# Patient Record
Sex: FEMALE | Race: BLACK OR AFRICAN AMERICAN | Employment: OTHER | URBAN - METROPOLITAN AREA
[De-identification: names, ages, dates, MRNs, and addresses within clinical notes are randomized per-mention and may not be internally consistent; named-entity substitution may affect disease eponyms.]

---

## 2017-01-05 ENCOUNTER — TELEPHONE (OUTPATIENT)
Dept: FAMILY MEDICINE CLINIC | Age: 82
End: 2017-01-05

## 2017-01-05 NOTE — TELEPHONE ENCOUNTER
1st attempt to reach patient regarding scheduling follow up appointment with dr Deejay Lott and also for fasting labs left message to call back

## 2017-01-05 NOTE — TELEPHONE ENCOUNTER
----- Message from Kelton Venegas MD sent at 1/3/2017  1:54 AM EST -----  Please call patient and let her know that she needs to reschedule her appointment so we can review her labs - many of which were abnormal. She canceled on 12/21 and her next appointment is not until 3/2017. I need to see her in the office before that - preferably this month. She needs to repeat her fasting lipid panel along with some other labs before she comes back to see me and do the UPEP that was ordered. Thanks.

## 2017-01-11 NOTE — TELEPHONE ENCOUNTER
Spoke to pt aware needs to come in for sooner appointment to recheck fasting lipids and UPEP values pt verbalized full understanding then stated she has been under the weather since before Christmas and was seen in the ER and the dr checked her over thoroughly to pull records to give to Dr Lula Benitez and then call her to see if she still needs to come in

## 2017-02-08 RX ORDER — BENAZEPRIL HYDROCHLORIDE 40 MG/1
40 TABLET ORAL DAILY
Qty: 90 TAB | Refills: 0 | Status: SHIPPED | OUTPATIENT
Start: 2017-02-08 | End: 2017-11-21 | Stop reason: SDUPTHER

## 2017-02-08 NOTE — TELEPHONE ENCOUNTER
Requested Prescriptions     Pending Prescriptions Disp Refills    benazepril (LOTENSIN) 40 mg tablet 90 Tab 3     Sig: Take 1 Tab by mouth daily.    last visit 12/16/16  Next visit 2/20/17

## 2017-02-21 ENCOUNTER — OFFICE VISIT (OUTPATIENT)
Dept: FAMILY MEDICINE CLINIC | Age: 82
End: 2017-02-21

## 2017-02-21 VITALS
WEIGHT: 122 LBS | BODY MASS INDEX: 21.62 KG/M2 | DIASTOLIC BLOOD PRESSURE: 78 MMHG | HEIGHT: 63 IN | TEMPERATURE: 98.2 F | OXYGEN SATURATION: 99 % | RESPIRATION RATE: 20 BRPM | HEART RATE: 86 BPM | SYSTOLIC BLOOD PRESSURE: 141 MMHG

## 2017-02-21 DIAGNOSIS — E11.9 CONTROLLED TYPE 2 DIABETES MELLITUS WITHOUT COMPLICATION, WITHOUT LONG-TERM CURRENT USE OF INSULIN (HCC): ICD-10-CM

## 2017-02-21 DIAGNOSIS — E83.52 HYPERCALCEMIA: ICD-10-CM

## 2017-02-21 DIAGNOSIS — R42 DIZZINESS: ICD-10-CM

## 2017-02-21 DIAGNOSIS — E78.2 MIXED HYPERLIPIDEMIA: ICD-10-CM

## 2017-02-21 DIAGNOSIS — R26.81 GAIT INSTABILITY: ICD-10-CM

## 2017-02-21 DIAGNOSIS — D75.839 THROMBOCYTOSIS: ICD-10-CM

## 2017-02-21 DIAGNOSIS — I10 ESSENTIAL HYPERTENSION: ICD-10-CM

## 2017-02-21 NOTE — PROGRESS NOTES
Stephan Townsend is a 80 y.o. female and presents with Follow-up (hypertension)       Subjective:  Mrs. Jose Alejandro Watkins is here today to follow up on her chronic medical conditions. I was supposed to see her back before now, but she canceled her 12/21/16 appointment. Patient also visited the ER in 12/2016 because she was concerned about her elevated BP and felt dizzy/lightheaded. Workup in the ER was pretty unremarkable. Her labs all looked good and exam was normal. I am unable to view her EKG at this time, but provider wrote that it was unchanged from her previous. She also had a head CT that stated \"Atrophy. Small vessel disease. There is no mass nor hemorrhage. There is no acute infarct. \" She says that she has been doing well sine then, although the dizziness has persisted intermittently. Patient says she was given meclizine in the ED and that helped a lot with the dizziness. She has been taking it prn ever since because she had some left over at home from a previous prescriptions. Last time she required a meclizine was on Sunday. 1. HTN: /78 which I think is okay for someone her age, especially given her c/o dizziness/lightheadedness. Meds include Lotensin 40 mg and Norvasc 5 mg. Patient reports compliance taking her meds. 2. Dizziness/Lightheadedness: Per previous notes, patient has c/o dizziness in the past, but it was related to her taking all of her BP meds at once. When she started taking them one at a time, her symptoms improved a lot. Today, patient reports that the dizziness/lightheadedness she has been experiencing for the last couple of months is different. Per Mrs. Gonsales, the dizziness usually occurs right after she eats breakfast every morning and before she has even taken her medications. Symptoms last about 30-45 minutes. Sitting in her recliner watching TV and drinking water helps. Room is not spinning, but she feels like she might fall down while walking.  Patient says symptoms not made worse by moving her head or changing positions. Denies visual changes, n/v, chest pain, sob. Denies loss of consciousness. This happens every morning after she has eaten, however, symptoms do not occur after eating any other meal throughout the day. 3. HLD: Last FLP from 12/2016 with LDL of 146 is inaccurate because patient realized later that she had eaten a full breakfast that morning. Patient is prescribed Tricor and Zocor 40 mg. She is taking the Tricor, but skips the Zocor because she is supposed to take it in the evening and she goes to bed too early. Need to repeat labs.      4. T2DM: Most recent A1c up to 7.0. Medication is Januvia 100 mg, but she does not take it daily as prescribed - she says her previous pcp (Dr. Rancho Hoyos) told her to take the Januvia only if her FBG in the am is > 125. Given the increase in her A1c from 6.6 --> 7.0, we discussed her starting to take the Januvia daily or even cutting the dose to 50 mg daily since she only takes the 100 mg tablet about once/wk. However, patient is not thrilled with the idea and wants to continue taking the medication prn. Her Ophthalmologist is Dr. Ty Painting - last visit was 3/2016. She has never seen a Podiatrist.       5. Hypercalcemia: Chronic since 2015. Calcium still up from labs in 12/2016. PTH-rp <1.1, SPEP wnl, Vitamin D 25-OH level slightly low at 29.1, Vitamin D 1,25 dihydroxy was elevated at 79.5. Patient denies any symptoms - no abdominal pain, no bone pain, no hx of kidney stones that she reports.      6. Thrombocytosis: Platelets elevated. Noted consistently on CBCs in our records, but not in care everywhere.         Health Maintenance:  Colonoscopy - Followed by Dr. Bertha Ford. Last in 2/2012. Exam limited to the hepatic flexure secondary to a poor colon preparation/moderate diverticulosis/internal hemorrhoids  Mammogram - Patient reports that she has not had one in about 3 years.  Last in [facility] is from 2010 - no evidence of malignancy  DEXA Scan - She is not sure if she ever had one or wants one  Flu Shot - Declined for 2016    ROS:  Pt denies: Wt loss, Fever/Chills, Visual changes, Fatigue, Chest pain, SOB, SHINE, Abd pain, N/V/D/C, Blood in stool or urine, Edema. Pertinent positive as above in HPI. All others negative.  (+) HA - last night, but not today. She took 4 baby aspirin and it  I encouraged her to try Tylenol for HA in the future. .     The problem list was updated as a part of today's visit. Patient Active Problem List   Diagnosis Code    Hypertension I10    Hypercalcemia E83.52    Diabetes ([facility] E11.9    Hyperlipidemia E78.5    Goiter E04.9    Diverticulosis K57.90    Immunization refused Z28.21    [facility] mellitus type [facility], [facility] E11.9       The PSH, FH were reviewed. SH:  Social History   Substance Use Topics    Smoking status: Former Smoker     Packs/day: 0.25     Years: 1.00     Types: Cigarettes    Smokeless tobacco: None    Alcohol use No         Medications/Allergies:  Current Outpatient Prescriptions on File Prior to Visit   Medication Sig Dispense Refill    benazepril (LOTENSIN) 40 mg tablet Take 1 Tab by mouth daily. 90 Tab 0    SITagliptin (JANUVIA) 100 mg tablet Take 1 Tab by mouth daily. 30 Tab 0    simvastatin (ZOCOR) 40 mg tablet Take 1 Tab by mouth nightly. 90 Tab 3    amLODIPine (NORVASC) 5 mg tablet Take 1 Tab by mouth daily. 90 Tab 3    cholecalciferol (VITAMIN D3) 1,000 unit tablet Take 2 Tabs by mouth daily. 60 Tab 11    aspirin delayed-release 81 mg tablet Take  by mouth daily.  Blood-Glucose Meter (EMBRACE BLOOD GLUCOSE SYSTEM) misc by Does Not Apply route.  omega-3 fatty acids-vitamin e (FISH OIL) 1,000 mg cap One capsule daily. 60 Cap 5    fenofibrate nanocrystallized (TRICOR) 48 mg tablet Take 1 Tab by mouth daily. 90 Tab 3     No current facility-administered medications on file prior to visit.            Allergies   Allergen Reactions    Codeine Other (comments)     Felt like a different person, per patient    Iodine Itching    Pcn [Penicillins] Itching    Sulfa (Sulfonamide Antibiotics) Unknown (comments)    Vicodin [Hydrocodone-Acetaminophen] Nausea Only       Objective:  Visit Vitals    /78 (BP 1 Location: Left arm, BP Patient Position: Sitting)    Pulse 86    Temp 98.2 °F (36.8 °C) (Oral)    Resp 20    Ht 5' 3\" (1.6 m)    Wt 122 lb (55.3 kg)    SpO2 99%  Comment: room air    BMI 21.61 kg/m2    Body mass index is 21.61 kg/(m^2). Appearance: Alert, well appearing, in no respiratory distress and well hydrated. HEENT: NC/AT. Exterior ears and tympanic membranes normal bilaterally. Conjunctiva normal. PERRL. EOMI  Oropharynx clear and moist mucous membranes. Neck: Supple. No cervical lymphadenopathy. Heart: RRR without M/R/G. Intact distal pulses. No edema. Lungs: CTAB, no rhonchi, rales, or wheezes with good air exchange  Abdomen: Soft, normoactive BS, non-tender, non-distended, no palpable masses   MSK: Gait normal. Joints without deformity/tenderness. Strength intact bilateral upper and lower ext. ROM all extremities. Feet warm and well perfused with good pulses. Skin: No rash   Neuro: AAO x 3. CN 2-12 intact. No focal motor or sensory deficits. Speech normal.  Psych: Appropriate affect, judgement and insight. Short-term memory intact.      Labwork and Ancillary Studies:    CBC w/Diff  Lab Results   Component Value Date/Time    WBC 5.3 12/16/2016 08:32 AM    HGB 13.1 12/16/2016 08:32 AM    PLATELET 884 63/22/6433 08:32 AM         Basic Metabolic Profile  Lab Results   Component Value Date/Time    Sodium 141 12/16/2016 08:32 AM    Potassium 4.4 12/16/2016 08:32 AM    Chloride 102 12/16/2016 08:32 AM    CO2 27 12/16/2016 08:32 AM    Glucose 163 12/16/2016 08:32 AM    BUN 16 12/16/2016 08:32 AM    Creatinine 1.13 12/16/2016 08:32 AM    BUN/Creatinine ratio 14 12/16/2016 08:32 AM    GFR est AA 51 12/16/2016 08:32 AM    GFR est non-AA 44 12/16/2016 08:32 AM    Calcium 11.0 12/16/2016 08:32 AM        Cholesterol  Lab Results   Component Value Date/Time    Cholesterol, total 238 12/16/2016 08:32 AM    HDL Cholesterol 58 12/16/2016 08:32 AM    LDL, calculated 146 12/16/2016 08:32 AM    Triglyceride 171 12/16/2016 08:32 AM       Assessment/Plan:      ICD-10-CM ICD-9-CM    1. Essential hypertension I10 401.9 BP okay today. Do not want it any lower at this time given patient's c/o dizziness and lightheadedness. METABOLIC PANEL, BASIC   2. Dizziness R42 780.4 Unclear etiology. Does not sound like typical vertigo. Related to her eating breakfast only? Asked patient to check her BP when symptoms occur. Orthostatics checked in the office today - Normal. Neuro exam unremarkable today. Gait normal. Will ask Neuro to weigh in. REFERRAL TO NEUROLOGY   3. Gait instability R26.81 781.2 REFERRAL TO NEUROLOGY   4. Mixed hyperlipidemia E78.2 272.2 Need to repeat lipid panel since patient was not fasting before. Suspect LDL will be worse since she has been skipping her statin. Encouraged patient be compliant with her meds. LIPID PANEL   5. Controlled type 2 diabetes mellitus without complication, without long-term current use of insulin (HCC) E11.9 250.00 A1c up to 7.0 from 6.6. Patient still unwilling to take Januvia daily. In fact, she refuses to take any diabetes medication daily. She wants to try and work on her diet for now. 6. Hypercalcemia E83.52 275.42 Not quite sure why levels are up in our records and normal in Care Everywhere. Will repeat level along with intact PTH. Patient still needs to do 24 hour urine tests - she refused last time. Will also get CXR given elevated 1,25-dihydroxy vitamin d. No lymph nodes appreciated on exam today. May also need CT abdomen to look at liver, kidneys etc. Asking heme/onc to weigh in on this as well.   METABOLIC PANEL, BASIC      PTH INTACT      CALCIUM, UR, 24 HR      PROTEIN ELECTROPHORESIS + JOSE ENRIQUE, UR, 24HR      XR CHEST PA LAT   7. Thrombocytosis (HCC) D47.3 238.71 Not quite sure why levels are up in our system and normal in Care Everywhere. Will repeat CBC along with peripheral smear. Heme/Onc to help. CBC WITH AUTOMATED DIFF      PERIPHERAL SMEAR      REFERRAL TO HEMATOLOGY ONCOLOGY       Follow-up Disposition:  Return in about 2 months (around 4/21/2017). Fasting lab appointment on Monday and CXR - patient can't do today because her son has to go to work and she needs to leave. .    I have discussed the diagnosis with the patient and the intended plan as seen in the above orders. The patient has received an After-Visit Summary and questions were answered concerning future plans. Patient verbalized understanding of above instructions. Health Maintenance:   Health Maintenance   Topic Date Due    FOOT EXAM Q1  12/08/2016    MEDICARE YEARLY EXAM  03/18/2017    EYE EXAM RETINAL OR DILATED Q1  03/28/2017    HEMOGLOBIN A1C Q6M  06/16/2017    Pneumococcal 65+ Low/Medium Risk (2 of 2 - PPSV23) 06/17/2017    MICROALBUMIN Q1  12/16/2017    LIPID PANEL Q1  12/16/2017    GLAUCOMA SCREENING Q2Y  03/28/2018    DTaP/Tdap/Td series (2 - Td) 06/17/2026    OSTEOPOROSIS SCREENING (DEXA)  Addressed    ZOSTER VACCINE AGE 60>  Addressed    INFLUENZA AGE 9 TO ADULT  Addressed       No orders of the defined types were placed in this encounter.

## 2017-02-21 NOTE — MR AVS SNAPSHOT
Visit Information Date & Time Provider Department Dept. Phone Encounter #  
 2/21/2017 11:45 AM Andrew Peralta, [facility] [phone] [id number] Follow-up Instructions Return in about 2 months (around 4/21/2017) for Fasting lab appointment on Monday and CXR. Your Appointments 3/20/2017 11:00 AM  
Office Visit with Andrew Peralta MD  
[address] Appt Note: [address] [place] [facility] [address]  
  
   
 [address]  [address] [address] [place] [address] [address] Upcoming Health Maintenance Date Due  
 FOOT EXAM Q1 12/8/2016 MEDICARE YEARLY EXAM 3/18/2017 EYE EXAM RETINAL OR DILATED Q1 3/28/2017 HEMOGLOBIN A1C Q6M 6/16/2017 Pneumococcal 65+ Low/Medium Risk (2 of 2 - PPSV23) 6/17/2017 MICROALBUMIN Q1 12/16/2017 LIPID PANEL Q1 12/16/2017 GLAUCOMA SCREENING Q2Y 3/28/2018 DTaP/Tdap/Td series (2 - Td) 6/17/2026 Allergies as of 2/21/2017  Review Complete On: 12/12/2016 By: Tova Mccurdy LPN Severity Noted Reaction Type Reactions Codeine  11/10/2015    Other (comments) Felt like a different person, per patient Iodine  11/10/2015    Itching Pcn [Penicillins]  11/10/2015    Itching Sulfa (Sulfonamide Antibiotics)  11/10/2015    Unknown (comments) Vicodin [Hydrocodone-acetaminophen]  11/10/2015    Nausea Only Current Immunizations  Reviewed on 6/17/2016 No immunizations on file. Not reviewed this visit You Were Diagnosed With   
  
 Codes Comments Dizziness    -  Primary ICD-10-CM: S84 ICD-9-CM: 780.4 Gait instability     ICD-10-CM: R26.81 
ICD-9-CM: 847. 2 Vitals BP Pulse Temp Resp Height(growth percentile) Weight(growth percentile) 141/78 (BP 1 Location: Left arm, BP Patient Position: Sitting) 86 98.2 °F (36.8 °C) (Oral) 20 5' 3\" (1.6 m) 122 lb (55.3 kg) SpO2 BMI OB Status Smoking Status 99% 21.61 kg/m2 Hysterectomy Former Smoker Vitals History BMI and BSA Data Body Mass Index Body Surface Area  
 21.61 kg/m 2 1.57 m 2 Preferred Pharmacy Pharmacy Name Phone [id number] [phone] Your Updated Medication List  
  
   
This list is accurate as of: 2/21/17 12:51 PM.  Always use your most recent med list. amLODIPine 5 mg tablet Commonly known as:  Greenberg Andrés Take 1 Tab by mouth daily. aspirin delayed-release 81 mg tablet Take  by mouth daily. benazepril 40 mg tablet Commonly known as:  LOTENSIN Take 1 Tab by mouth daily. cholecalciferol 1,000 unit tablet Commonly known as:  VITAMIN D3 Take 2 Tabs by mouth daily. [id number] Generic drug:  Blood-Glucose Meter  
by Does Not Apply route. fenofibrate nanocrystallized 48 mg tablet Commonly known as:  [employer] Take 1 Tab by mouth daily. FISH OIL 1,000 mg Cap Generic drug:  omega-3 fatty acids-vitamin e One capsule daily. simvastatin 40 mg tablet Commonly known as:  ZOCOR Take 1 Tab by mouth nightly. SITagliptin 100 mg tablet Commonly known as:  Vallery Alberto Take 1 Tab by mouth daily. Prescriptions Sent to Pharmacy Refills SITagliptin (JANUVIA) 100 mg tablet 0 Sig: Take 1 Tab by mouth daily. Class: Normal  
 Pharmacy: On license of [facility] [address] #: [phone] Route: Oral  
  
Follow-up Instructions Return in about 2 months (around 4/21/2017) for Fasting lab appointment on Monday and CXR. Introducing [facility]! Agustin Siddiqui introduces [employer] patient portal. Now you can access parts of your medical record, email your doctor's office, and request medication refills online. 1. In your internet browser, go to [email or web address] 2. Click on the First Time User? Click Here link in the Sign In box. You will see the New Member Sign Up page. 3. Enter your [employer] Access Code exactly as it appears below. You will not need to use this code after youve completed the sign-up process. If you do not sign up before the expiration date, you must request a new code. · [employer] Access Code: [email or web address] Expires: 3/12/2017 10:33 AM 
 
4. Enter the last four digits of your Social Security Number (xxxx) and Date of Birth (mm/dd/yyyy) as indicated and click Submit. You will be taken to the next sign-up page. 5. Create a CityLive ID. This will be your [employer] login ID and cannot be changed, so think of one that is secure and easy to remember. 6. Create a [employer] password. You can change your password at any time. 7. Enter your Password Reset Question and Answer. This can be used at a later time if you forget your password. 8. Enter your e-mail address. You will receive e-mail notification when new information is available in [address] 9. Click Sign Up. You can now view and download portions of your medical record. 10. Click the Download Summary menu link to download a portable copy of your medical information. If you have questions, please visit the Frequently Asked Questions section of the [employer] website. Remember, [employer] is NOT to be used for urgent needs. For medical emergencies, dial 911. Now available from your iPhone and Android! Please provide this summary of care documentation to your next provider. Your primary care clinician is listed as Herman Malave. If you have any questions after today's visit, please call [phone].

## 2017-02-21 NOTE — PROGRESS NOTES
Pt here today for follow up hypertension    1. Have you been to the ER, urgent care clinic since your last visit? Hospitalized since your last visit? yes [facility] 12/21/2016    2. Have you seen or consulted any other health care providers outside of the [address] [place] [address] since your last visit? Include any pap smears or colon screening.  No

## 2017-03-06 ENCOUNTER — HOSPITAL ENCOUNTER (OUTPATIENT)
Dept: INFUSION THERAPY | Age: 82
Discharge: HOME | End: 2017-03-06
Payer: MEDICARE

## 2017-03-06 ENCOUNTER — OFFICE VISIT (OUTPATIENT)
Dept: ONCOLOGY | Age: 82
End: 2017-03-06

## 2017-03-06 VITALS
OXYGEN SATURATION: 100 % | HEART RATE: 66 BPM | SYSTOLIC BLOOD PRESSURE: 182 MMHG | BODY MASS INDEX: 21.62 KG/M2 | RESPIRATION RATE: 17 BRPM | DIASTOLIC BLOOD PRESSURE: 91 MMHG | HEIGHT: 63 IN | WEIGHT: 122 LBS | TEMPERATURE: 98.7 F

## 2017-03-06 VITALS
SYSTOLIC BLOOD PRESSURE: 181 MMHG | DIASTOLIC BLOOD PRESSURE: 84 MMHG | WEIGHT: 122 LBS | OXYGEN SATURATION: 100 % | RESPIRATION RATE: 17 BRPM | HEART RATE: 75 BPM | BODY MASS INDEX: 21.62 KG/M2 | TEMPERATURE: 98 F | HEIGHT: 63 IN

## 2017-03-06 DIAGNOSIS — D75.839 THROMBOCYTOSIS: Primary | ICD-10-CM

## 2017-03-06 DIAGNOSIS — E83.52 HYPERCALCEMIA: ICD-10-CM

## 2017-03-06 DIAGNOSIS — I10 ESSENTIAL HYPERTENSION: ICD-10-CM

## 2017-03-06 LAB
CRP SERPL-MCNC: <0.3 MG/DL (ref 0–0.3)
FERRITIN SERPL-MCNC: 88 NG/ML (ref 8–388)
IRON SATN MFR SERPL: 24 %
IRON SERPL-MCNC: 73 UG/DL (ref 50–175)
TIBC SERPL-MCNC: 299 UG/DL (ref 250–450)

## 2017-03-06 PROCEDURE — 81270 JAK2 GENE: CPT | Performed by: INTERNAL MEDICINE

## 2017-03-06 PROCEDURE — 83540 ASSAY OF IRON: CPT | Performed by: INTERNAL MEDICINE

## 2017-03-06 PROCEDURE — 86140 C-REACTIVE PROTEIN: CPT | Performed by: INTERNAL MEDICINE

## 2017-03-06 PROCEDURE — 36415 COLL VENOUS BLD VENIPUNCTURE: CPT

## 2017-03-06 PROCEDURE — 82728 ASSAY OF FERRITIN: CPT | Performed by: INTERNAL MEDICINE

## 2017-03-06 NOTE — MR AVS SNAPSHOT
Visit Information Date & Time Provider Department Dept. Phone Encounter #  
 3/6/2017 10:00 AM Rene Amor, [address]  Oncology [phone] [id number] Follow-up Instructions Return in about 2 weeks (around 3/20/2017), or if symptoms worsen or fail to improve, for review results of work-up. Routing History Follow-up and Disposition History Your Appointments 3/20/2017 11:00 AM  
Office Visit with Jon Tabares MD  
[facility] [address] [place] [address]) Appt Note: [address] [place]  
  
   
 [address] [id number]  
  
    
 3/21/2017 10:00 AM  
Follow Up with Rene Amor MD  
[address] [facility] Oncology [address] [place] [address]) Appt Note: 2 week follow up; 2 week follow up  
 [address] [facility] [address] [facility] [address]  
  
    
 4/24/2017  9:00 AM  
Follow Up with Jon Tabares MD  
[address]) Appt Note: 2 mo f/u  
 [facility] [place]  
  
   
 [address] [place] [address] [address] Upcoming Health Maintenance Date Due  
 FOOT EXAM Q1 12/8/2016 EYE EXAM RETINAL OR DILATED Q1 3/28/2017 MEDICARE YEARLY EXAM 3/18/2017 HEMOGLOBIN A1C Q6M 6/16/2017 Pneumococcal 65+ Low/Medium Risk (2 of 2 - PPSV23) 6/17/2017 MICROALBUMIN Q1 12/16/2017 LIPID PANEL Q1 12/16/2017 GLAUCOMA SCREENING Q2Y 3/28/2018 DTaP/Tdap/Td series (2 - Td) 6/17/2026 Allergies as of 3/6/2017  Review Complete On: 3/6/2017 By: Rene Amor MD  
  
 Severity Noted Reaction Type Reactions Codeine  11/10/2015    Other (comments) Felt like a different person, per patient Iodine  11/10/2015    Itching Pcn [Penicillins]  11/10/2015    Itching Sulfa (Sulfonamide Antibiotics)  11/10/2015    Unknown (comments) Vicodin [Hydrocodone-acetaminophen]  11/10/2015    Nausea Only Current Immunizations  Reviewed on 6/17/2016 No immunizations on file. Not reviewed this visit You Were Diagnosed With   
  
 Codes Comments Thrombocytosis ([facility] [address]    -  Primary ICD-10-CM: D47.3 ICD-9-CM: 238.71 Essential hypertension     ICD-10-CM: I10 
ICD-9-CM: 401.9 Hypercalcemia     ICD-10-CM: W16.27 
ICD-9-CM: 275.42 Vitals BP Pulse Temp Resp Height(growth percentile) Weight(growth percentile) 181/84 (BP 1 Location: Right arm, BP Patient Position: Sitting) 75 98 °F (36.7 °C) (Oral) 17 5' 3\" (1.6 m) 122 lb (55.3 kg) SpO2 BMI OB Status Smoking Status 100% 21.61 kg/m2 Hysterectomy Former Smoker Vitals History BMI and BSA Data Body Mass Index Body Surface Area  
 21.61 kg/m 2 1.57 m 2 Preferred Pharmacy Pharmacy Name Phone [id number] [phone] Your Updated Medication List  
  
   
This list is accurate as of: 3/6/17 12:13 PM.  Always use your most recent med list. amLODIPine 5 mg tablet Commonly known as:  Horris Bills Take 1 Tab by mouth daily. aspirin delayed-release 81 mg tablet Take  by mouth daily. benazepril 40 mg tablet Commonly known as:  LOTENSIN Take 1 Tab by mouth daily. cholecalciferol 1,000 unit tablet Commonly known as:  VITAMIN D3 Take 2 Tabs by mouth daily. [address] Generic drug:  Blood-Glucose Meter  
by Does Not Apply route. fenofibrate nanocrystallized 48 mg tablet Commonly known as:  [employer] Take 1 Tab by mouth daily. FISH OIL 1,000 mg Cap Generic drug:  omega-3 fatty acids-vitamin e One capsule daily. meclizine 25 mg tablet Commonly known as:  ANTIVERT Take 1 Tab by mouth three (3) times daily as needed. simvastatin 40 mg tablet Commonly known as:  ZOCOR Take 1 Tab by mouth nightly. SITagliptin 100 mg tablet Commonly known as:  Nay Bannister Take 1 Tab by mouth daily. Follow-up Instructions Return in about 2 weeks (around 3/20/2017), or if symptoms worsen or fail to improve, for review results of work-up. To-Do List   
 03/06/2017 Lab:  C REACTIVE PROTEIN, QT   
  
 03/06/2017 Lab:  FERRITIN   
  
 03/06/2017 Lab:  IRON PROFILE   
  
 03/06/2017 Lab:  JAK2 MUTATION ANALYSIS Please provide this summary of care documentation to your next provider. Your primary care clinician is listed as Vernon Hassan. If you have any questions after today's visit, please call [phone].

## 2017-03-06 NOTE — PROGRESS NOTES
[facility] HEMATOLOGY ONCOLOGY       Assessment/ Plan:     Patient Active Problem List   Diagnosis Code    Hypertension I10    Hypercalcemia E83.52    Diabetes ([facility] [address] E11.9    Hyperlipidemia E78.5    Goiter E04.9    Diverticulosis K57.90    Immunization refused Z28.21    Diabetes mellitus type 2, controlled (HCC) E11.9     1.) Mild Thrombocytosis  -Reactive vs. other etiologies  -SPEP was WNL  -Platelet count slightly elevated  -Iron studies ordered  -I believe its unlikely that her thrombocytosis is due to a chronic myeloproliferative disorder (e.g. E.T.-essential thrombocythemia)  -Lab w/u in progress  -Rtc. in 2 weeks to review reults and for further medical decision making    2.)  Low Vitamin D level/ Mildly Elevated serum Calcium  -Cont.vit. 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 I have discussed the diagnosis with the patient and the intended plan. The patient verbalized understanding and agrees with the plan. History:   Radha Castillo is a 80 y.o. female presenting today for mildly elevated platelet count. Current Outpatient Prescriptions   Medication Sig Dispense Refill    benazepril (LOTENSIN) 40 mg tablet Take 1 Tab by mouth daily. 90 Tab 0    amLODIPine (NORVASC) 5 mg tablet Take 1 Tab by mouth daily. 90 Tab 3    cholecalciferol (VITAMIN D3) 1,000 unit tablet Take 2 Tabs by mouth daily. 60 Tab 11    omega-3 fatty acids-vitamin e (FISH OIL) 1,000 mg cap One capsule daily. 60 Cap 5    meclizine (ANTIVERT) 25 mg tablet Take 1 Tab by mouth three (3) times daily as needed. 12 Tab 0    SITagliptin (JANUVIA) 100 mg tablet Take 1 Tab by mouth daily.  30 Tab 0    simvastatin (ZOCOR) 40 mg tablet Take 1 Tab by mouth nightly. 90 Tab 3    fenofibrate nanocrystallized (TRICOR) 48 mg tablet Take 1 Tab by mouth daily. 90 Tab 3    aspirin delayed-release 81 mg tablet Take  by mouth daily.  Blood-Glucose Meter (EMBRACE BLOOD GLUCOSE SYSTEM) misc by Does Not Apply route. Objective:   VS:    Visit Vitals    /84 (BP 1 Location: Right arm, BP Patient Position: Sitting)    Pulse 75    Temp 98 °F (36.7 °C) (Oral)    Resp 17    Ht 5' 3\" (1.6 m)    Wt 55.3 kg (122 lb)    SpO2 100%    BMI 21.61 kg/m2        Physical Exam:     Constitutional:  well developed, well nourished, no acute distress and alert and oriented x 3    HENT:  Oral mucosa pink and moist, no cyanosis observed and no pallor observed.    Eyes:  conjunctiva normal, upper and lower eyelid normal bilaterally.    Neck:  No jugular venous distension present.    Cardiovascular:  heart sounds normal, normal rate and regular rhythm   Pulmonary/Chest Wall:  breath sounds are clear bilaterally and no use of accessory muscles in breathing.    Abdominal:  Non tender, no palpable masses, no hepatosplenomegaly, and no abdominal bruits.        Musculoskeletal:  No digital clubbing or cyanosis. Normal muscle strength and tone.    Skin:  Normal and no rashes or lesions    Peripheral Vascular:  No edema present in extremities. Femoral pulse normal bilaterally. Dorsalis pedis pulse normal bilaterally.        Review of Systems    Review of Systems - History obtained from the patient  General ROS: positive for  - malaise  Psychological ROS: negative for - anxiety  Ophthalmic ROS: negative for - blurry vision  ENT ROS: negative for - epistaxis  Allergy and Immunology ROS: negative  negative for - hives  Hematological and Lymphatic ROS: negative for - bruising  Endocrine ROS: negative for - skin changes  Breast ROS: negative for breast lumps  Respiratory ROS: negative for - cough  Cardiovascular ROS: negative for - chest pain  Gastrointestinal ROS: no abdominal pain, change in bowel habits, or black or bloody stools  negative for - abdominal pain  Genito-Urinary ROS: negative for - dysuria  Musculoskeletal ROS: negative  negative for - joint pain  Neurological ROS: negative for - confusion, + for dizziness  Dermatological ROS: negative  negative for - pruritus or rash        No results found for this or any previous visit (from the past 168 hour(s)).     Past Medical History:   Diagnosis Date    Diverticulosis     DM type 2 (diabetes mellitus, type 2) ([facility] [address] 2012    Goiter 1994    resolved    Hypercalcemia     nl PTH    Hyperlipidemia 2015    Hypertension 2010    Immunization refused     pt doesn't want any shots       Past Surgical History:   Procedure Laterality Date    HX CATARACT REMOVAL Bilateral 2000    Dr. Ray Millard  2014   [address]    Patient states Total hysterectomy       Social History     Social History    Marital status: UNKNOWN     Spouse name: N/A    Number of children: N/A    Years of education: N/A     Occupational History    retired nurse      Social History Main Topics    Smoking status: Former Smoker     Packs/day: 0.25     Years: 1.00     Types: Cigarettes    Smokeless tobacco: Never Used    Alcohol use No    Drug use: No    Sexual activity: No      Comment:      Other Topics Concern    Not on file     Social History Narrative       Family History   Problem Relation Age of Onset    Cancer Father     Breast Cancer Sister        Allergies   Allergen Reactions    Codeine Other (comments)     Felt like a different person, per patient    Iodine Itching    Pcn [Penicillins] Itching    Sulfa (Sulfonamide Antibiotics) Unknown (comments)    Vicodin [Hydrocodone-Acetaminophen] Nausea Only       Follow-up Disposition: Not on File    Pola Velasquez MD Hematology-Medical Oncology

## 2017-03-06 NOTE — PROGRESS NOTES
[facility] Progress Note    Date: 2017    Name: Shyam Dietrich    MRN: [id number]         : 1929    Peripheral Lab Draw      Ms. Gonsales to [facility], ambulatory at 1210. Pt was assessed and education was provided. Ms. Carolee Worley vitals were reviewed and patient was observed for 5 minutes prior to treatment. Visit Vitals    BP (!) 182/91 (BP 1 Location: Left arm, BP Patient Position: Sitting)    Pulse 66    Temp 98.7 °F (37.1 °C)    Resp 17    Ht 5' 3\" (1.6 m)    Wt 55.3 kg (122 lb)    SpO2 100%    Breastfeeding No    BMI 21.61 kg/m2     Recent Results (from the past 12 hour(s))   C REACTIVE PROTEIN, QT    Collection Time: 17 12:30 PM   Result Value Ref Range    C-Reactive protein <0.3 0 - 0.3 mg/dL   IRON PROFILE    Collection Time: 17 12:30 PM   Result Value Ref Range    Iron 73 50 - 175 ug/dL    TIBC 299 250 - 450 ug/dL    Iron % saturation 24 %   FERRITIN    Collection Time: 17 12:30 PM   Result Value Ref Range    Ferritin 88 8 - 388 NG/ML         Blood obtained peripherally from LAC x 1 attempt with butterfly needle and sent to lab for LON 2 Mutation, C reactive Protein, IP, Ferritin per written orders. No bleeding or hematoma noted at site. Guaze and coban applied. Ms. Lina Landa tolerated the phlebotomy, and had no complaints. Patient armband removed and shredded. Ms. Lina Landa was discharged from [place] [address] in stable condition at 1230. She is to return for follow up with physician as needed.      Roxy Magdaleno, WARD  2017

## 2017-03-06 NOTE — PROGRESS NOTES
Nicholas Holcomb is a 80 y.o. female who presents to [facility] care. 1. Have you been to the ER, urgent care clinic since your last visit? Hospitalized since your last visit? NO    2. Have you seen or consulted any other health care providers outside of the [address] [place] [address] since your last visit? Include any pap smears or colon screening. NO    Health Maintenance reviewed.     Health Maintenance Due   Topic Date Due    FOOT EXAM Q1  12/08/2016    EYE EXAM RETINAL OR DILATED Q1  03/28/2017

## 2017-03-07 LAB
BASOPHILS # BLD AUTO: 0 X10E3/UL (ref 0–0.2)
BASOPHILS NFR BLD AUTO: 0 %
BUN SERPL-MCNC: 15 MG/DL (ref 8–27)
BUN/CREAT SERPL: 14 (ref 11–26)
CA-I SERPL ISE-MCNC: 6.2 MG/DL (ref 4.5–5.6)
CALCIUM SERPL-MCNC: 10.8 MG/DL (ref 8.7–10.3)
CHLORIDE SERPL-SCNC: 102 MMOL/L (ref 96–106)
CHOLEST SERPL-MCNC: 161 MG/DL (ref 100–199)
CO2 SERPL-SCNC: 24 MMOL/L (ref 18–29)
CREAT SERPL-MCNC: 1.08 MG/DL (ref 0.57–1)
EOSINOPHIL # BLD AUTO: 0.1 X10E3/UL (ref 0–0.4)
EOSINOPHIL NFR BLD AUTO: 1 %
ERYTHROCYTE [DISTWIDTH] IN BLOOD BY AUTOMATED COUNT: 13.9 % (ref 12.3–15.4)
GLUCOSE SERPL-MCNC: 141 MG/DL (ref 65–99)
HCT VFR BLD AUTO: 39 % (ref 34–46.6)
HDLC SERPL-MCNC: 56 MG/DL
HGB BLD-MCNC: 12.7 G/DL (ref 11.1–15.9)
IMM GRANULOCYTES # BLD: 0 X10E3/UL (ref 0–0.1)
IMM GRANULOCYTES NFR BLD: 0 %
INTERPRETATION, 910389: NORMAL
INTERPRETATION: NORMAL
LDLC SERPL CALC-MCNC: 74 MG/DL (ref 0–99)
LYMPHOCYTES # BLD AUTO: 1.7 X10E3/UL (ref 0.7–3.1)
LYMPHOCYTES NFR BLD AUTO: 26 %
MCH RBC QN AUTO: 29.3 PG (ref 26.6–33)
MCHC RBC AUTO-ENTMCNC: 32.6 G/DL (ref 31.5–35.7)
MCV RBC AUTO: 90 FL (ref 79–97)
MONOCYTES # BLD AUTO: 0.7 X10E3/UL (ref 0.1–0.9)
MONOCYTES NFR BLD AUTO: 12 %
NEUTROPHILS # BLD AUTO: 3.8 X10E3/UL (ref 1.4–7)
NEUTROPHILS NFR BLD AUTO: 61 %
PATH REV BLD -IMP: ABNORMAL
PATH REV BLD -IMP: NORMAL
PATH REV BLD -IMP: NORMAL
PATHOLOGIST NAME: ABNORMAL
PDF IMAGE, 910387: NORMAL
PLATELET # BLD AUTO: 396 X10E3/UL (ref 150–379)
POTASSIUM SERPL-SCNC: 4.2 MMOL/L (ref 3.5–5.2)
PTH-INTACT SERPL-MCNC: 25 PG/ML (ref 15–65)
RBC # BLD AUTO: 4.34 X10E6/UL (ref 3.77–5.28)
SODIUM SERPL-SCNC: 142 MMOL/L (ref 134–144)
TRIGL SERPL-MCNC: 153 MG/DL (ref 0–149)
VLDLC SERPL CALC-MCNC: 31 MG/DL (ref 5–40)
WBC # BLD AUTO: 6.3 X10E3/UL (ref 3.4–10.8)

## 2017-03-08 LAB
ALBUMIN 24H MFR UR ELPH: 27.6 %
ALPHA1 GLOB 24H MFR UR ELPH: 3.4 %
ALPHA2 GLOB 24H MFR UR ELPH: 10.8 %
B-GLOBULIN MFR UR ELPH: 21.8 %
BASOPHILS # BLD AUTO: 0 X10E3/UL (ref 0–0.2)
BASOPHILS NFR BLD AUTO: 0 %
BUN SERPL-MCNC: 19 MG/DL (ref 8–27)
BUN/CREAT SERPL: 18 (ref 11–26)
CALCIUM 24H UR-MCNC: 26.4 MG/DL
CALCIUM 24H UR-MRATE: 633.6 MG/24 HR (ref 100–300)
CALCIUM SERPL-MCNC: 10.8 MG/DL (ref 8.7–10.3)
CHLORIDE SERPL-SCNC: 103 MMOL/L (ref 96–106)
CHOLEST SERPL-MCNC: 214 MG/DL (ref 100–199)
CO2 SERPL-SCNC: 23 MMOL/L (ref 18–29)
CREAT SERPL-MCNC: 1.08 MG/DL (ref 0.57–1)
EOSINOPHIL # BLD AUTO: 0.1 X10E3/UL (ref 0–0.4)
EOSINOPHIL NFR BLD AUTO: 1 %
ERYTHROCYTE [DISTWIDTH] IN BLOOD BY AUTOMATED COUNT: 14 % (ref 12.3–15.4)
GAMMA GLOB 24H MFR UR ELPH: 36.5 %
GLUCOSE SERPL-MCNC: 115 MG/DL (ref 65–99)
HCT VFR BLD AUTO: 38.9 % (ref 34–46.6)
HDLC SERPL-MCNC: 57 MG/DL
HGB BLD-MCNC: 12.4 G/DL (ref 11.1–15.9)
IMM GRANULOCYTES # BLD: 0 X10E3/UL (ref 0–0.1)
IMM GRANULOCYTES NFR BLD: 0 %
INTERPRETATION UR IFE-IMP: ABNORMAL
INTERPRETATION, 910389: NORMAL
INTERPRETATION: NORMAL
LDLC SERPL CALC-MCNC: 117 MG/DL (ref 0–99)
LYMPHOCYTES # BLD AUTO: 1.6 X10E3/UL (ref 0.7–3.1)
LYMPHOCYTES NFR BLD AUTO: 28 %
M PROTEIN 24H MFR UR ELPH: ABNORMAL %
MCH RBC QN AUTO: 28.1 PG (ref 26.6–33)
MCHC RBC AUTO-ENTMCNC: 31.9 G/DL (ref 31.5–35.7)
MCV RBC AUTO: 88 FL (ref 79–97)
MONOCYTES # BLD AUTO: 0.7 X10E3/UL (ref 0.1–0.9)
MONOCYTES NFR BLD AUTO: 12 %
NEUTROPHILS # BLD AUTO: 3.5 X10E3/UL (ref 1.4–7)
NEUTROPHILS NFR BLD AUTO: 59 %
NOTE, 149533: ABNORMAL
PDF IMAGE, 910387: NORMAL
PLATELET # BLD AUTO: 382 X10E3/UL (ref 150–379)
POTASSIUM SERPL-SCNC: 4.4 MMOL/L (ref 3.5–5.2)
PROT 24H UR-MRATE: 211.2 MG/24 HR (ref 30–150)
PROT UR-MCNC: 8.8 MG/DL
PTH-INTACT SERPL-MCNC: 32 PG/ML (ref 15–65)
RBC # BLD AUTO: 4.42 X10E6/UL (ref 3.77–5.28)
SODIUM SERPL-SCNC: 143 MMOL/L (ref 134–144)
TRIGL SERPL-MCNC: 198 MG/DL (ref 0–149)
VLDLC SERPL CALC-MCNC: 40 MG/DL (ref 5–40)
WBC # BLD AUTO: 5.9 X10E3/UL (ref 3.4–10.8)

## 2017-03-10 LAB
BACKGROUND: 489207: NORMAL
DIRECTOR REVIEW: 489204: NORMAL
JAK2 P.V617F BLD/T QL: NORMAL

## 2017-03-20 ENCOUNTER — OFFICE VISIT (OUTPATIENT)
Dept: FAMILY MEDICINE CLINIC | Age: 82
End: 2017-03-20

## 2017-03-20 VITALS
RESPIRATION RATE: 20 BRPM | HEIGHT: 63 IN | WEIGHT: 123 LBS | OXYGEN SATURATION: 99 % | SYSTOLIC BLOOD PRESSURE: 155 MMHG | DIASTOLIC BLOOD PRESSURE: 77 MMHG | HEART RATE: 79 BPM | TEMPERATURE: 98.2 F | BODY MASS INDEX: 21.79 KG/M2

## 2017-03-20 DIAGNOSIS — E78.2 MIXED HYPERLIPIDEMIA: ICD-10-CM

## 2017-03-20 DIAGNOSIS — E11.9 CONTROLLED TYPE 2 DIABETES MELLITUS WITHOUT COMPLICATION, WITHOUT LONG-TERM CURRENT USE OF INSULIN (HCC): ICD-10-CM

## 2017-03-20 DIAGNOSIS — Z13.39 SCREENING FOR ALCOHOLISM: ICD-10-CM

## 2017-03-20 DIAGNOSIS — Z00.00 ROUTINE GENERAL MEDICAL EXAMINATION AT A HEALTH CARE FACILITY: ICD-10-CM

## 2017-03-20 DIAGNOSIS — N18.30 CKD (CHRONIC KIDNEY DISEASE) STAGE 3, GFR 30-59 ML/MIN (HCC): ICD-10-CM

## 2017-03-20 DIAGNOSIS — D75.839 THROMBOCYTOSIS: ICD-10-CM

## 2017-03-20 DIAGNOSIS — E55.9 VITAMIN D DEFICIENCY: ICD-10-CM

## 2017-03-20 DIAGNOSIS — E83.52 HYPERCALCEMIA: ICD-10-CM

## 2017-03-20 DIAGNOSIS — R42 DIZZINESS: ICD-10-CM

## 2017-03-20 DIAGNOSIS — I10 ESSENTIAL HYPERTENSION: ICD-10-CM

## 2017-03-20 LAB — HBA1C MFR BLD HPLC: 6.3 %

## 2017-03-20 NOTE — PROGRESS NOTES
Alize Briggs is a 80 y.o. female and presents with Annual Wellness Visit (annual)       Subjective:  Mrs. Vanessa Decker is here today today for her annual [address] However, she also wanted to review some of her chronic medical conditions. I first began seeing Mrs. Vanessa Decker back in 12/2016 and she is a poor historian. I also often have to repeat myself and/or explain things several times before she accepts what I am saying. I asked her if she has anyone who can help with her medical care and decision making, but she says no.      1. HTN: /77 which is not at goal for her. Bp was also quite high when she visited Dr. Emily Pappas office earlier this month Meds include Lotensin 40 mg and Norvasc 5 mg. Patient reports compliance taking her meds.      2. Dizziness/Lightheadedness:   - This problem has been occurring off and on for the last few months. Initially, her dizziness was related to her taking all of her BP meds at once. When she started taking them one at a time, her symptoms improved a lot. However, she then described a dizziness that would occur right after eating breakfast every morning and before she had even taken her medications. No symptoms after any other meals. Symptoms would last about 30-45 minutes. Sitting in her recliner watching TV and drinking water would help. She never described feeling like the room was spinning, but she did report feeling unsteady on her feet. Symptoms were not made worse by moving her head or changing positions. Denied visual changes, n/v, chest pain, sob, loss of consciousness. Head CT in the ER - Atrophy. Small vessel disease. There is no mass nor hemorrhage. There is no acute infarct. Orthostatics were negative in the office.   - Today, Mrs. Vanessa Decker says that she feels her dizziness is better overall and she thinks it has been at least a week since she experienced symptoms. Eating and drinking more consistently has helped.  She stopped taking her fenofibrate because she felt that was the cause of her dizziness. She has also stopped taking her Zocor for some reason - even though she was not taking it consistently in the first place. Patient reports she will not be driving anymore because she cannot pay the toll bills that have accrued on her car. 3. HLD: Recent labs are confusing as she has 2 sets of results only 5 days apart. Will need to determine which one is accurate. Patient has stopped her Tricor and Zocor 40 mg because she felt they were making her sick. She agreed to go back on a statin (will try to remember to take it). 4. T2DM: POC A1c today in the office was 6.3. Medication is Januvia 100 mg, but she does not take it daily as prescribed - she says her previous pcp (Dr. Chacorta Martinez) told her to take the Januvia only if her FBG in the am is > 125 and she adamant about sticking to this regimen. Microalbumin/Cr ratio done for 2016. Patient is on an ACE-I. Her Ophthalmologist is Dr. Jeannine Ohara - last visit was 3/2016. She has been referred to a Podiatrist.       5. Hypercalcemia: Chronic since 2015. PTH intact low-normal. PTH-rp <1.1, SPEP wnl, UPEP remarkable for elevated protein/24hr and normal immunofixation pattern, Vitamin D 25-OH level slightly low at 29.1, Vitamin D 1,25 dihydroxy was minimally elevated at 79.5 and 24hr urine calcium revealed elevated urine calcium. Patient denies any symptoms - no abdominal pain, no bone pain, no hx of kidney stones that she reports.       6. Thrombocytosis: Followed by Heme/Onc. Dr. Noel Bosworth    7. CKD stage 3: Creatinine stable.           Health Maintenance:  Colonoscopy - Followed by Dr. Sofía Carter. Last in 2/2012. Exam limited to the hepatic flexure secondary to a poor colon preparation/moderate diverticulosis/internal hemorrhoids. She does not want to have anymore colonoscopies. Mammogram - Patient reports that she has not had one in about 3 years and does not want anymore.  Last in [facility] is from 2010 - no evidence of malignancy  DEXA Scan - She is not sure if she ever had one or wants one  Flu Shot - Declined for 2016          ROS:  Pt denies: Wt loss, Fever/Chills, HA, Visual changes, Fatigue, Chest pain, SOB, SHINE, Abd pain, N/V/D/C, Blood in stool or urine, Edema. Pertinent positive as above in HPI. All others negative. The problem list was updated as a part of today's visit. Patient Active Problem List   Diagnosis Code    Hypertension I10    Hypercalcemia E83.52    Diabetes ([facility] [address] E11.9    Hyperlipidemia E78.5    Goiter E04.9    Diverticulosis K57.90    Immunization refused Z28.21    Diabetes mellitus type 2, controlled ([facility] [address] E11.9    Thrombocytosis (HCC) D47.3       The PSH, FH were reviewed. SH:  Social History   Substance Use Topics    Smoking status: Former Smoker     Packs/day: 0.25     Years: 1.00     Types: Cigarettes    Smokeless tobacco: Never Used    Alcohol use No         Medications/Allergies:  Current Outpatient Prescriptions on File Prior to Visit   Medication Sig Dispense Refill    meclizine (ANTIVERT) 25 mg tablet Take 1 Tab by mouth three (3) times daily as needed. 12 Tab 0    SITagliptin (JANUVIA) 100 mg tablet Take 1 Tab by mouth daily. 30 Tab 0    benazepril (LOTENSIN) 40 mg tablet Take 1 Tab by mouth daily. 90 Tab 0    simvastatin (ZOCOR) 40 mg tablet Take 1 Tab by mouth nightly. 90 Tab 3    amLODIPine (NORVASC) 5 mg tablet Take 1 Tab by mouth daily. 90 Tab 3    fenofibrate nanocrystallized (TRICOR) 48 mg tablet Take 1 Tab by mouth daily. 90 Tab 3    cholecalciferol (VITAMIN D3) 1,000 unit tablet Take 2 Tabs by mouth daily. 60 Tab 11    aspirin delayed-release 81 mg tablet Take  by mouth daily.  Blood-Glucose Meter ([facility] [employer] BLOOD GLUCOSE SYSTEM) misc by Does Not Apply route.  omega-3 fatty acids-vitamin e (FISH OIL) 1,000 mg cap One capsule daily. 60 Cap 5     No current facility-administered medications on file prior to visit.            Allergies   Allergen Reactions    Codeine Other (comments)     Felt like a different person, per patient    Iodine Itching    Pcn [Penicillins] Itching    Sulfa (Sulfonamide Antibiotics) Unknown (comments)    Vicodin [Hydrocodone-Acetaminophen] Nausea Only       Objective:  Visit Vitals    /77    Pulse 79    Temp 98.2 °F (36.8 °C) (Oral)    Resp 20    Ht 5' 3\" (1.6 m)    Wt 123 lb (55.8 kg)    SpO2 99%  Comment: room air    BMI 21.79 kg/m2    Body mass index is 21.79 kg/(m^2). Appearance: Alert, well appearing, in no respiratory distress and well hydrated. HEENT: NC/AT. Exterior ears and tympanic membranes normal bilaterally. Conjunctiva normal. PERRL. EOMI  Oropharynx clear and moist mucous membranes. Neck: Supple. No cervical lymphadenopathy. Heart: RRR without M/R/G. Intact distal pulses. No edema. Lungs: CTAB, no rhonchi, rales, or wheezes with good air exchange  Abdomen: Soft, normoactive BS, non-tender, non-distended, no palpable masses   MSK: Gait normal. Joints without deformity/tenderness. Strength intact bilateral upper and lower ext. ROM all extremities. Feet warm and well perfused with good pulses. Skin: No rash   Neuro: AAO x 3. CN 2-12 intact. No focal motor or sensory deficits. Speech normal.  Psych: Appropriate affect, judgement and insight. Short-term memory intact.      Labwork and Ancillary Studies:    CBC w/Diff  Lab Results   Component Value Date/Time    WBC 5.9 03/04/2017 09:31 AM    HGB 12.4 03/04/2017 09:31 AM    PLATELET 130 67/89/2678 09:31 AM         Basic Metabolic Profile  Lab Results   Component Value Date/Time    Sodium 143 03/04/2017 09:31 AM    Potassium 4.4 03/04/2017 09:31 AM    Chloride 103 03/04/2017 09:31 AM    CO2 23 03/04/2017 09:31 AM    Glucose 115 03/04/2017 09:31 AM    BUN 19 03/04/2017 09:31 AM    Creatinine 1.08 03/04/2017 09:31 AM    BUN/Creatinine ratio 18 03/04/2017 09:31 AM    GFR est AA 53 03/04/2017 09:31 AM    GFR est non-AA 46 03/04/2017 09:31 AM Calcium 10.8 03/04/2017 09:31 AM        Cholesterol  Lab Results   Component Value Date/Time    Cholesterol, total 214 03/04/2017 09:31 AM    HDL Cholesterol 57 03/04/2017 09:31 AM    LDL, calculated 117 03/04/2017 09:31 AM    Triglyceride 198 03/04/2017 09:31 AM       Assessment/Plan:      ICD-10-CM ICD-9-CM    1. Routine general medical examination at a health care facility Z00.00 V70.0 [address] paperwork given to the patient along with Advanced Directive forms. 2. Screening for alcoholism Z13.89 V79.1 Done   3. Essential hypertension I10 401.9 BP not at goal in our office or when she saw Dr. Carlita Escoto earlier this month. Patient instructed to increase Norvasc to 10 mg and record bps at home al this week - call Friday with the results. Return in 1 week for official BP check. 4. Hypercalcemia E83.52 275.42 Work up thus far includes:  - Elevated serum and ionized calcium  - PTH intact in mid-normal range  - Elevated 24 hour urine calcium  - PTH-rp < 1.1  - SPEP and UPEP unremarkable aside from elevated urine protein/24hr  - Vitamin D 25-OH 29.1  - Vitamin D 1,25 Dihydroxy minimally elevated at 79.5. Follow up CXR remarkable for hyperinflated lungs. Results are curious. Mid-normal PTH intact with elevated 24 hr urine calcium makes me suspicious for hyperparathyroidism. However, minimally elevated vitamin d 1,25 dihydroxy suspicious for granulomatous disease vs lymphomas? Patient has no LAD on exam and CBC only remarkable for thrombocytosis (followed by Heme/Onc). CXR unimpressive as well. May need CT. Will also refer to Endocrine given my suspicions about Hyperparathyroid. 5. Controlled type 2 diabetes mellitus without complication, without long-term current use of insulin (HCC) E11.9 250.00 AMB POC HEMOGLOBIN A1C - 6.3   Continue current medications. Patient insistent upon taking Januvia prn only.  Patient reminded to set up an appointment with her ophthalmologist.    6. Dizziness R42 780.4 Patient reports improvement in her symptoms. She was given information for Dr. Sam Parks so she can call and make her own appointment. Referral already done. 7. Mixed hyperlipidemia E78.2 272.2 Will need to determine which labs are accurate. Patient will go back on statin in the evenings. Zocor changed to Lipitor given increase in Norvasc dosage. 8. Thrombocytosis ([address] D47.3 238.71 Followed by Dr. Sadie Blackman. Notes reviewed. 9. Vitamin D deficiency E55.9 268.9 Level slightly low. Would like her in 30-40 range given her elevated calcium. Patient saving money to purchase OTC supplement. 10. CKD (chronic kidney disease) stage 3, GFR 30-59 ml/min N18.3 585. 3 Creatinine stable and actually improved from last labs. Will follow. Follow-up Disposition:  Return in about 1 week (around 3/27/2017) for BP check. I have discussed the diagnosis with the patient and the intended plan as seen in the above orders. The patient has received an After-Visit Summary and questions were answered concerning future plans. Patient verbalized understanding of above instructions. Health Maintenance:   Health Maintenance   Topic Date Due    FOOT EXAM Q1  12/08/2016    EYE EXAM RETINAL OR DILATED Q1  03/28/2017    HEMOGLOBIN A1C Q6M  06/16/2017    Pneumococcal 65+ Low/Medium Risk (2 of 2 - PPSV23) 06/17/2017    MICROALBUMIN Q1  12/16/2017    LIPID PANEL Q1  03/04/2018    MEDICARE YEARLY EXAM  03/21/2018    GLAUCOMA SCREENING Q2Y  03/28/2018    DTaP/Tdap/Td series (2 - Td) 06/17/2026    OSTEOPOROSIS SCREENING (DEXA)  Addressed    ZOSTER VACCINE AGE 60>  Addressed    INFLUENZA AGE 9 TO ADULT  Addressed       No orders of the defined types were placed in this encounter.         This is a Subsequent Medicare Annual Wellness Visit providing Personalized Prevention Plan Services (PPPS) (Performed 12 months after initial AWV and PPPS )    I have reviewed the patient's medical history in detail and updated the computerized patient record. History     Past Medical History:   Diagnosis Date    Diverticulosis     DM type 2 (diabetes mellitus, type 2) ([facility] [address] 2012    Goiter 1994    resolved    Hypercalcemia     nl PTH    Hyperlipidemia 2015    Hypertension 2010    Immunization refused     pt doesn't want any shots      Past Surgical History:   Procedure Laterality Date    HX CATARACT REMOVAL Bilateral 2000    Dr. Humza Ochoa  2014     [address]    Patient states Total hysterectomy     Current Outpatient Prescriptions   Medication Sig Dispense Refill    meclizine (ANTIVERT) 25 mg tablet Take 1 Tab by mouth three (3) times daily as needed. 12 Tab 0    SITagliptin (JANUVIA) 100 mg tablet Take 1 Tab by mouth daily. 30 Tab 0    benazepril (LOTENSIN) 40 mg tablet Take 1 Tab by mouth daily. 90 Tab 0    simvastatin (ZOCOR) 40 mg tablet Take 1 Tab by mouth nightly. 90 Tab 3    amLODIPine (NORVASC) 5 mg tablet Take 1 Tab by mouth daily. 90 Tab 3    fenofibrate nanocrystallized (TRICOR) 48 mg tablet Take 1 Tab by mouth daily. 90 Tab 3    cholecalciferol (VITAMIN D3) 1,000 unit tablet Take 2 Tabs by mouth daily. 60 Tab 11    aspirin delayed-release 81 mg tablet Take  by mouth daily.  Blood-Glucose Meter (EMBRACE BLOOD GLUCOSE SYSTEM) misc by Does Not Apply route.  omega-3 fatty acids-vitamin e (FISH OIL) 1,000 mg cap One capsule daily.  60 Cap 5     Allergies   Allergen Reactions    Codeine Other (comments)     Felt like a different person, per patient    Iodine Itching    Pcn [Penicillins] Itching    Sulfa (Sulfonamide Antibiotics) Unknown (comments)    Vicodin [Hydrocodone-Acetaminophen] Nausea Only     Family History   Problem Relation Age of Onset    Cancer Father     Breast Cancer Sister      Social History   Substance Use Topics    Smoking status: Former Smoker     Packs/day: 0.25     Years: 1.00     Types: Cigarettes    Smokeless tobacco: Never Used    Alcohol use No     Patient Active Problem List   Diagnosis Code    Hypertension I10    Hypercalcemia E83.52    Diabetes ([facility] [address] E11.9    Hyperlipidemia E78.5    Goiter E04.9    Diverticulosis K57.90    Immunization refused Z28.21    Diabetes mellitus type 2, controlled ([facility] [address] E11.9    Thrombocytosis (HCC) D47.3       Depression Risk Factor Screening:     PHQ 2 / 9, over the last two weeks 3/20/2017   Little interest or pleasure in doing things Not at all   Feeling down, depressed or hopeless Not at all   Total Score PHQ 2 0     Alcohol Risk Factor Screening: On any occasion during the past 3 months, have you had more than 3 drinks containing alcohol? No    Do you average more than 7 drinks per week? No      Functional Ability and Level of Safety:     Hearing Loss   mild    Activities of Daily Living   Self-care. Requires assistance with: no ADLs    Fall Risk     Fall Risk Assessment, last 12 mths 3/20/2017   Able to walk? Yes   Fall in past 12 months? No     Abuse Screen   Patient is not abused    Review of Systems   A comprehensive review of systems was negative except for that written in the HPI. Physical Examination     Evaluation of Cognitive Function:  Mood/affect:  neutral  Appearance: age appropriate  Family member/caregiver input: None    Patient Care Team:  Susana Hood MD as PCP - General (Internal Medicine)  Markus Damon MD (Ophthalmology)    Advice/Referrals/Counseling   Education and counseling provided:  Are appropriate based on today's review and evaluation  End-of-Life planning (with patient's consent)  Patient given a packet on advanced directive that she can fill out and bring back to us. Health Maintenance Due   Topic Date Due    FOOT EXAM Q1  12/08/2016    EYE EXAM RETINAL OR DILATED Q1  03/28/2017   Patient says she has never seen a Podiatrist and would like a referral. However, she was referred to Dr. Dilip Shi back in 12/2016.  Will give her the info to his office so she can call and make an appointment. Patient sees Dr. Law Juarez for her eye exams and plans to make an appointment once she has some money. lab results and schedule of future lab studies reviewed with patient  reviewed diet, exercise and weight control.

## 2017-03-20 NOTE — MR AVS SNAPSHOT
Visit Information Date & Time Provider Department Dept. Phone Encounter #  
 3/20/2017 11:00 AM Yemi Escudero, 445 [facility] [phone] [id number] Your Appointments 3/21/2017 10:00 AM  
Follow Up with Jolene Guevara MD  
[address] [place] [address] 365[facility]) Appt Note: 2 week follow up; 2 week follow up  
 [address] 434 [facility] [address]01 [address]  
  
    
 4/24/2017  9:00 AM  
Follow Up with Yemi Escudero MD  
[place] [address] 365[address] [facility]) Appt Note: 2 mo f/u  
 [place]. [address]  
  
   
 [address] [facility] Upcoming Health Maintenance Date Due  
 FOOT EXAM Q1 12/8/2016 MEDICARE YEARLY EXAM 3/18/2017 EYE EXAM RETINAL OR DILATED Q1 3/28/2017 HEMOGLOBIN A1C Q6M 6/16/2017 Pneumococcal 65+ Low/Medium Risk (2 of 2 - PPSV23) 6/17/2017 MICROALBUMIN Q1 12/16/2017 LIPID PANEL Q1 3/4/2018 GLAUCOMA SCREENING Q2Y 3/28/2018 DTaP/Tdap/Td series (2 - Td) 6/17/2026 Allergies as of 3/20/2017  Review Complete On: 3/20/2017 By: Antoinette Thakkar LPN Severity Noted Reaction Type Reactions Codeine  11/10/2015    Other (comments) Felt like a different person, per patient Iodine  11/10/2015    Itching Pcn [Penicillins]  11/10/2015    Itching Sulfa (Sulfonamide Antibiotics)  11/10/2015    Unknown (comments) Vicodin [Hydrocodone-acetaminophen]  11/10/2015    Nausea Only Current Immunizations  Reviewed on 6/17/2016 No immunizations on file. Not reviewed this visit You Were Diagnosed With   
  
 Codes Comments Routine general medical examination at a health care facility     ICD-10-CM: Z00.00 ICD-9-CM: V70.0 Screening for alcoholism     ICD-10-CM: Z13.89 ICD-9-CM: V79.1 Controlled type 2 diabetes mellitus without complication, without long-term current use of insulin ([facility]     ICD-10-CM: E11.9 ICD-9-CM: 250.00 Vitals BP Pulse Temp Resp Height(growth percentile) Weight(growth percentile) 155/77 79 98.2 °F (36.8 °C) (Oral) 20 5' 3\" (1.6 m) 123 lb (55.8 kg) SpO2 BMI OB Status Smoking Status 99% 21.79 kg/m2 Hysterectomy Former Smoker BMI and BSA Data Body Mass Index Body Surface Area 21.79 kg/m 2 1.57 m 2 Preferred Pharmacy Pharmacy Name Phone [phone] Your Updated Medication List  
  
   
This list is accurate as of: 3/20/17 12:45 PM.  Always use your most recent med list. amLODIPine 5 mg tablet Commonly known as:  Jimenez Fernie Take 1 Tab by mouth daily. aspirin delayed-release 81 mg tablet Take  by mouth daily. benazepril 40 mg tablet Commonly known as:  LOTENSIN Take 1 Tab by mouth daily. cholecalciferol 1,000 unit tablet Commonly known as:  VITAMIN D3 Take 2 Tabs by mouth daily. [address] Generic drug:  Blood-Glucose Meter  
by Does Not Apply route. FISH OIL 1,000 mg Cap Generic drug:  omega-3 fatty acids-vitamin e One capsule daily. meclizine 25 mg tablet Commonly known as:  ANTIVERT Take 1 Tab by mouth three (3) times daily as needed. simvastatin 40 mg tablet Commonly known as:  ZOCOR Take 1 Tab by mouth nightly. SITagliptin 100 mg tablet Commonly known as:  Cristela Stevens Take 1 Tab by mouth daily. We Performed the Following AMB POC HEMOGLOBIN A1C [69576 CPT(R)] Patient Instructions Medicare Wellness Visit, Female The best way to live healthy is to have a healthy lifestyle by eating a well-balanced diet, exercising regularly, limiting alcohol and stopping smoking. Regular physical exams and screening tests are another way to keep healthy. Preventive exams provided by your health care provider can find health problems before they become diseases or illnesses. Preventive services including immunizations, screening tests, monitoring and exams can help you take care of your own health. All people over age 72 should have a pneumovax  and and a prevnar shot to prevent pneumonia. These are once in a lifetime unless you and your provider decide differently. All people over 65 should have a yearly flu shot and a tetanus vaccine every 10 years. A bone mass density to screen for osteoporosis or thinning of the bones should be done every 2 years after 65. Screening for diabetes mellitus with a blood sugar test should be done every year. Glaucoma is a disease of the eye due to increased ocular pressure that can lead to blindness and it should be done every year by an eye professional. 
 
Cardiovascular screening tests that check for elevated lipids (fatty part of blood) which can lead to heart disease and strokes should be done every 5 years. Colorectal screening that evaluates for blood or polyps in your colon should be done yearly as a stool test or every five years as a flexible sigmoidoscope or every 10 years as a colonoscopy up to age 76. Breast cancer screening with a mammogram is recommended biennially  for women age 54-69. Screening for cervical cancer with a pap smear and pelvic exam is recommended for women after age 72 years every 2 years up to age 79 or when the provider and patient decide to stop. If there is a history of cervical abnormalities or other increased risk for cancer then the test is recommended yearly. Hepatitis C screening is also recommended for anyone born between [birth year] through [address]. A shingles vaccine is also recommended once in a lifetime after age 61. Your Medicare Wellness Exam is recommended annually. Here is a list of your current Health Maintenance items with a due date: 
Health Maintenance Due Topic Date Due  
 Diabetic Foot Care  12/08/2016 Tsering Smoker Annual Well Visit  [facility]  03/28/2017 Please provide this summary of care documentation to your next provider. Your primary care clinician is listed as Mary Yeh. If you have any questions after today's visit, please call [phone].

## 2017-03-20 NOTE — PATIENT INSTRUCTIONS

## 2017-03-20 NOTE — PROGRESS NOTES
Pt here today for annual medicare wellness visit    1. Have you been to the ER, urgent care clinic since your last visit? Hospitalized since your last visit? No    2. Have you seen or consulted any other health care providers outside of the [address] [place] [address] since your last visit? Include any pap smears or colon screening.  No

## 2017-03-21 ENCOUNTER — OFFICE VISIT (OUTPATIENT)
Dept: ONCOLOGY | Age: 82
End: 2017-03-21

## 2017-03-21 VITALS
HEIGHT: 63 IN | RESPIRATION RATE: 18 BRPM | BODY MASS INDEX: 21.79 KG/M2 | DIASTOLIC BLOOD PRESSURE: 83 MMHG | HEART RATE: 75 BPM | WEIGHT: 123 LBS | SYSTOLIC BLOOD PRESSURE: 150 MMHG | OXYGEN SATURATION: 98 % | TEMPERATURE: 98 F

## 2017-03-21 DIAGNOSIS — D75.839 THROMBOCYTOSIS: Primary | ICD-10-CM

## 2017-03-21 DIAGNOSIS — E83.52 HYPERCALCEMIA: ICD-10-CM

## 2017-03-21 NOTE — PROGRESS NOTES
[facility] HEMATOLOGY ONCOLOGY:       Assessment/ Plan:     Patient Active Problem List   Diagnosis Code    Hypertension I10    Hypercalcemia E83.52    Diabetes ([facility] E11.9    Hyperlipidemia E78.5    Goiter E04.9    Diverticulosis K57.90    Immunization refused Z28.21    Diabetes mellitus type 2, controlled ([facility] E11.9    Thrombocytosis (HCC) D47.3     1.) Mild Thrombocytosis (Thrombocythemia)  -Most likely Reactive   -SPEP was WNL  -Platelet count slightly elevated  -Iron studies were normal  -Lab w/u not c/w a CMPD (e.g. E.T.-essential thrombocythemia)  -Serum JAK2 was negative  -Rtc. In 3-6 months to repeat a CBC and for further medical decision making    2.)  Low Vitamin D level/ Mildly Elevated serum Calcium (chronic)  -Cont.vit. 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 I have discussed the diagnosis with the patient and the intended plan. The patient verbalized understanding and agrees with the plan. Subjective:        History:   Henry Pelayo is a 80 y.o. female presenting today for mildly elevated platelet count. Current Outpatient Prescriptions   Medication Sig Dispense Refill    meclizine (ANTIVERT) 25 mg tablet Take 1 Tab by mouth three (3) times daily as needed. 12 Tab 0    SITagliptin (JANUVIA) 100 mg tablet Take 1 Tab by mouth daily. 30 Tab 0    benazepril (LOTENSIN) 40 mg tablet Take 1 Tab by mouth daily. 90 Tab 0    simvastatin (ZOCOR) 40 mg tablet Take 1 Tab by mouth nightly. 90 Tab 3    amLODIPine (NORVASC) 5 mg tablet Take 1 Tab by mouth daily. 90 Tab 3    cholecalciferol (VITAMIN D3) 1,000 unit tablet Take 2 Tabs by mouth daily.  60 Tab 11    aspirin delayed-release 81 mg tablet Take  by mouth daily.  Blood-Glucose Meter (EMBRACE BLOOD GLUCOSE SYSTEM) misc by Does Not Apply route.  omega-3 fatty acids-vitamin e (FISH OIL) 1,000 mg cap One capsule daily. 60 Cap 5       Objective:   VS:    Visit Vitals    /83 (BP 1 Location: Left arm, BP Patient Position: Sitting)    Pulse 75    Temp 98 °F (36.7 °C) (Oral)    Resp 17    Ht 5' 3\" (1.6 m)    Wt 55.8 kg (123 lb)    SpO2 98%    BMI 21.79 kg/m2        Physical Exam:     Constitutional:  well developed, well nourished, no acute distress and alert and oriented x 3    HENT:  Oral mucosa pink and moist, no cyanosis observed and no pallor observed.    Eyes:  conjunctiva normal, upper and lower eyelid normal bilaterally.    Neck:  No jugular venous distension present.    Cardiovascular:  heart sounds normal, normal rate and regular rhythm   Pulmonary/Chest Wall:  breath sounds are clear bilaterally     Abdominal:  Non tender, no palpable masses, no hepatosplenomegaly, and no abdominal bruits.        Musculoskeletal:  No digital clubbing or cyanosis. Normal muscle strength and tone.    Skin:  Normal and no rashes or lesions    Peripheral Vascular:  No edema present in extremities. Femoral pulse normal bilaterally. Dorsalis pedis pulse normal bilaterally.        Review of Systems    Review of Systems - History obtained from the patient  General ROS: positive for  - malaise  Psychological ROS: negative for - anxiety  Ophthalmic ROS: negative for - blurry vision  ENT ROS: negative for - epistaxis  Allergy and Immunology ROS: negative  negative for - hives  Hematological and Lymphatic ROS: negative for - bruising  Endocrine ROS: negative for - skin changes  Breast ROS: negative for breast lumps  Respiratory ROS: negative for - cough  Cardiovascular ROS: negative for - chest pain  Gastrointestinal ROS: no abdominal pain, change in bowel habits, or black or bloody stools  negative for - abdominal pain  Genito-Urinary ROS: negative for - dysuria  Musculoskeletal ROS: negative  negative for - joint pain  Neurological ROS: negative for - confusion, + for dizziness  Dermatological ROS: negative  negative for - pruritus or rash        No results found for this or any previous visit (from the past 168 hour(s)).     Past Medical History:   Diagnosis Date    Diverticulosis     DM type 2 (diabetes mellitus, type 2) ([facility] 2012    Goiter 1994    resolved    Hypercalcemia     nl PTH    Hyperlipidemia 2015    Hypertension 2010    Immunization refused     pt doesn't want any shots       Past Surgical History:   Procedure Laterality Date    HX CATARACT REMOVAL Bilateral 2000    Dr. Jennifer Vazquez  2014   [address]    Patient states Total hysterectomy       Social History     Social History    Marital status: UNKNOWN     Spouse name: N/A    Number of children: N/A    Years of education: N/A     Occupational History    retired nurse      Social History Main Topics    Smoking status: Former Smoker     Packs/day: 0.25     Years: 1.00     Types: Cigarettes    Smokeless tobacco: Never Used    Alcohol use No    Drug use: No    Sexual activity: No      Comment:      Other Topics Concern    Not on file     Social History Narrative       Family History   Problem Relation Age of Onset    Cancer Father     Breast Cancer Sister        Allergies   Allergen Reactions    Codeine Other (comments)     Felt like a different person, per patient    Iodine Itching    Pcn [Penicillins] Itching    Sulfa (Sulfonamide Antibiotics) Unknown (comments)    Vicodin [Hydrocodone-Acetaminophen] Nausea Only       Follow-up Disposition: Not on File    Minda Lee MD Hematology-Medical Oncology

## 2017-03-21 NOTE — PROGRESS NOTES
Maya Hernandez is a 80 y.o. female who presents today for follow up    1. Have you been to the ER, urgent care clinic since your last visit? Hospitalized since your last visit? NO    2. Have you seen or consulted any other health care providers outside of the [address] [place] [address] since your last visit? Include any pap smears or colon screening. NO    Health Maintenance reviewed.     Health Maintenance Due   Topic Date Due    FOOT EXAM Q1  12/08/2016    EYE EXAM RETINAL OR DILATED Q1  03/28/2017

## 2017-03-21 NOTE — MR AVS SNAPSHOT
Visit Information Date & Time Provider Department Dept. Phone Encounter #  
 3/21/2017 10:00 AM Mariah Garrido, [address] [phone] [id number] Follow-up Instructions Return in about 3 months (around 6/21/2017), or if symptoms worsen or fail to improve, for reevaluate. Routing History Follow-up and Disposition History Your Appointments 3/28/2017  9:00 AM  
Follow Up with Gomez Lynn MD  
[address]) Appt Note: 1 week f/u bp [place]. [address]  
  
   
 [address] [id number]  
  
    
 4/24/2017  9:00 AM  
Follow Up with Gomez Lynn MD  
[address]) Appt Note: 2 mo f/u  
 [employer] [address]. [address] [address]  
[phone] Upcoming Health Maintenance Date Due  
 FOOT EXAM Q1 12/8/2016 EYE EXAM RETINAL OR DILATED Q1 3/28/2017 HEMOGLOBIN A1C Q6M 6/16/2017 Pneumococcal 65+ Low/Medium Risk (2 of 2 - PPSV23) 6/17/2017 MICROALBUMIN Q1 12/16/2017 LIPID PANEL Q1 3/4/2018 MEDICARE YEARLY EXAM 3/21/2018 GLAUCOMA SCREENING Q2Y 3/28/2018 DTaP/Tdap/Td series (2 - Td) 6/17/2026 Allergies as of 3/21/2017  Review Complete On: 3/21/2017 By: Mariah Garrido MD  
  
 Severity Noted Reaction Type Reactions Codeine  11/10/2015    Other (comments) Felt like a different person, per patient Iodine  11/10/2015    Itching Pcn [Penicillins]  11/10/2015    Itching Sulfa (Sulfonamide Antibiotics)  11/10/2015    Unknown (comments) Vicodin [Hydrocodone-acetaminophen]  11/10/2015    Nausea Only Current Immunizations  Reviewed on 6/17/2016 No immunizations on file. Not reviewed this visit You Were Diagnosed With   
  
 Codes Comments Thrombocytosis ([facility]    -  Primary ICD-10-CM: D47.3 ICD-9-CM: 238.71   
 Hypercalcemia     ICD-10-CM: P55.56 
ICD-9-CM: 275.42 Vitals BP Pulse Temp Resp Height(growth percentile) Weight(growth percentile) 150/83 (BP 1 Location: Left arm, BP Patient Position: Sitting) 75 98 °F (36.7 °C) (Oral) 18 5' 3\" (1.6 m) 123 lb (55.8 kg) SpO2 BMI OB Status Smoking Status 98% 21.79 kg/m2 Hysterectomy Former Smoker Vitals History BMI and BSA Data Body Mass Index Body Surface Area 21.79 kg/m 2 1.57 m 2 Preferred Pharmacy Pharmacy Name Phone [id number] [phone] Your Updated Medication List  
  
   
This list is accurate as of: 3/21/17 10:32 AM.  Always use your most recent med list. amLODIPine 5 mg tablet Commonly known as:  Kennedy Ratna Take 1 Tab by mouth daily. aspirin delayed-release 81 mg tablet Take  by mouth daily. benazepril 40 mg tablet Commonly known as:  LOTENSIN Take 1 Tab by mouth daily. cholecalciferol 1,000 unit tablet Commonly known as:  VITAMIN D3 Take 2 Tabs by mouth daily. [address] Generic drug:  Blood-Glucose Meter  
by Does Not Apply route. FISH OIL 1,000 mg Cap Generic drug:  omega-3 fatty acids-vitamin e One capsule daily. meclizine 25 mg tablet Commonly known as:  ANTIVERT Take 1 Tab by mouth three (3) times daily as needed. simvastatin 40 mg tablet Commonly known as:  ZOCOR Take 1 Tab by mouth nightly. SITagliptin 100 mg tablet Commonly known as:  Estefanía Fake Take 1 Tab by mouth daily. Follow-up Instructions Return in about 3 months (around 6/21/2017), or if symptoms worsen or fail to improve, for reevaluate. Please provide this summary of care documentation to your next provider. Your primary care clinician is listed as Shaan Mcleod. If you have any questions after today's visit, please call [phone].

## 2017-03-24 RX ORDER — AMLODIPINE BESYLATE 10 MG/1
10 TABLET ORAL DAILY
Qty: 90 TAB | Refills: 1 | Status: SHIPPED | OUTPATIENT
Start: 2017-03-24 | End: 2017-12-12 | Stop reason: SDUPTHER

## 2017-03-24 RX ORDER — ATORVASTATIN CALCIUM 20 MG/1
20 TABLET, FILM COATED ORAL DAILY
Qty: 30 TAB | Refills: 5 | Status: SHIPPED | OUTPATIENT
Start: 2017-03-24 | End: 2017-12-12 | Stop reason: SDUPTHER

## 2017-03-27 ENCOUNTER — TELEPHONE (OUTPATIENT)
Dept: PRIMARY CARE CLINIC | Age: 82
End: 2017-03-27

## 2017-03-27 NOTE — TELEPHONE ENCOUNTER
Calling to go over her bp as was told her by dr Puneet Jovel. Would appreciate a call back to go over

## 2017-03-28 ENCOUNTER — OFFICE VISIT (OUTPATIENT)
Dept: FAMILY MEDICINE CLINIC | Age: 82
End: 2017-03-28

## 2017-03-28 VITALS
WEIGHT: 122.5 LBS | DIASTOLIC BLOOD PRESSURE: 72 MMHG | SYSTOLIC BLOOD PRESSURE: 139 MMHG | HEART RATE: 87 BPM | RESPIRATION RATE: 16 BRPM | BODY MASS INDEX: 21.71 KG/M2 | TEMPERATURE: 97.8 F | HEIGHT: 63 IN

## 2017-03-28 DIAGNOSIS — I10 ESSENTIAL HYPERTENSION: Primary | ICD-10-CM

## 2017-03-28 NOTE — PROGRESS NOTES
1. Have you been to the ER, urgent care clinic since your last visit? Hospitalized since your last visit? No    2. Have you seen or consulted any other health care providers outside of the [address] [place] [address] since your last visit? Include any pap smears or colon screening.  No

## 2017-03-28 NOTE — MR AVS SNAPSHOT
Visit Information Date & Time Provider Department Dept. Phone Encounter #  
 3/28/2017  2:15 PM Kiala Isaac NP [address] [id number] Follow-up Instructions Return if symptoms worsen or fail to improve, for keep appt with Dr Lillard Seip as previously scheduled. Your Appointments 4/24/2017  9:00 AM  
Follow Up with Ganesh Lemos MD  
[address] [facility]) Appt Note: 2 mo f/u  
 Noe Adriano. [address]  
  
   
 [address] [phone]  
  
    
 6/21/2017 10:00 AM  
Follow Up with Jose Gomez MD  
[facility]) Appt Note: 3 month follow up per Dr. Raleigh Menendez [address] [facility]  
  
   
 [address] Upcoming Health Maintenance Date Due Pneumococcal 65+ High/Highest Risk (1 of 2 - PCV13) 12/22/1994 FOOT EXAM Q1 12/8/2016 EYE EXAM RETINAL OR DILATED Q1 3/28/2017 HEMOGLOBIN A1C Q6M 9/20/2017 MICROALBUMIN Q1 12/16/2017 LIPID PANEL Q1 3/4/2018 MEDICARE YEARLY EXAM 3/21/2018 GLAUCOMA SCREENING Q2Y 3/28/2018 DTaP/Tdap/Td series (2 - Td) 6/17/2026 Allergies as of 3/28/2017  Review Complete On: 3/28/2017 By: Farrukh Loja LPN Severity Noted Reaction Type Reactions Codeine  11/10/2015    Other (comments) Felt like a different person, per patient Iodine  11/10/2015    Itching Pcn [Penicillins]  11/10/2015    Itching Sulfa (Sulfonamide Antibiotics)  11/10/2015    Unknown (comments) Vicodin [Hydrocodone-acetaminophen]  11/10/2015    Nausea Only Current Immunizations  Reviewed on 6/17/2016 No immunizations on file. Not reviewed this visit You Were Diagnosed With   
  
 Codes Comments Essential hypertension    -  Primary ICD-10-CM: I10 
ICD-9-CM: 401.9 Vitals BP Pulse Temp Resp Height(growth percentile) Weight(growth percentile) 139/72 87 97.8 °F (36.6 °C) (Oral) 16 5' 3\" (1.6 m) 122 lb 8 oz (55.6 kg) BMI OB Status Smoking Status 21.7 kg/m2 Hysterectomy Former Smoker BMI and BSA Data Body Mass Index Body Surface Area 21.7 kg/m 2 1.57 m 2 Preferred Pharmacy Pharmacy Name Phone [facility][id number] [phone] Your Updated Medication List  
  
   
This list is accurate as of: 3/28/17  2:37 PM.  Always use your most recent med list. amLODIPine 10 mg tablet Commonly known as:  Marisabel Castro Take 1 Tab by mouth daily. aspirin delayed-release 81 mg tablet Take  by mouth daily. atorvastatin 20 mg tablet Commonly known as:  LIPITOR Take 1 Tab by mouth daily. benazepril 40 mg tablet Commonly known as:  LOTENSIN Take 1 Tab by mouth daily. cholecalciferol 1,000 unit tablet Commonly known as:  VITAMIN D3 Take 2 Tabs by mouth daily. [address] Generic drug:  Blood-Glucose Meter  
by Does Not Apply route. FISH OIL 1,000 mg Cap Generic drug:  omega-3 fatty acids-vitamin e One capsule daily. meclizine 25 mg tablet Commonly known as:  ANTIVERT Take 1 Tab by mouth three (3) times daily as needed. SITagliptin 100 mg tablet Commonly known as:  Roel Soulier Take 1 Tab by mouth daily. Follow-up Instructions Return if symptoms worsen or fail to improve, for keep appt with Dr José Yousif as previously scheduled. Patient Instructions DASH Diet: Care Instructions Your Care Instructions The DASH diet is an eating plan that can help lower your blood pressure. DASH stands for Dietary Approaches to Stop Hypertension. Hypertension is high blood pressure.  
The DASH diet focuses on eating foods that are high in calcium, potassium, and magnesium. These nutrients can lower blood pressure. The foods that are highest in these nutrients are fruits, vegetables, low-fat dairy products, nuts, seeds, and legumes. But taking calcium, potassium, and magnesium supplements instead of eating foods that are high in those nutrients does not have the same effect. The DASH diet also includes whole grains, fish, and poultry. The DASH diet is one of several lifestyle changes your doctor may recommend to lower your high blood pressure. Your doctor may also want you to decrease the amount of sodium in your diet. Lowering sodium while following the DASH diet can lower blood pressure even further than just the DASH diet alone. Follow-up care is a key part of your treatment and safety. Be sure to make and go to all appointments, and call your doctor if you are having problems. It's also a good idea to know your test results and keep a list of the medicines you take. How can you care for yourself at home? Following the DASH diet · Eat 4 to 5 servings of fruit each day. A serving is 1 medium-sized piece of fruit, ½ cup chopped or canned fruit, 1/4 cup dried fruit, or 4 ounces (½ cup) of fruit juice. Choose fruit more often than fruit juice. · Eat 4 to 5 servings of vegetables each day. A serving is 1 cup of lettuce or raw leafy vegetables, ½ cup of chopped or cooked vegetables, or 4 ounces (½ cup) of vegetable juice. Choose vegetables more often than vegetable juice. · Get 2 to 3 servings of low-fat and fat-free dairy each day. A serving is 8 ounces of milk, 1 cup of yogurt, or 1 ½ ounces of cheese. · Eat 6 to 8 servings of grains each day. A serving is 1 slice of bread, 1 ounce of dry cereal, or ½ cup of cooked rice, pasta, or cooked cereal. Try to choose whole-grain products as much as possible. · Limit lean meat, poultry, and fish to 2 servings each day. A serving is 3 ounces, about the size of a deck of cards. · Eat 4 to 5 servings of nuts, seeds, and legumes (cooked dried beans, lentils, and split peas) each week. A serving is 1/3 cup of nuts, 2 tablespoons of seeds, or ½ cup of cooked beans or peas. · Limit fats and oils to 2 to 3 servings each day. A serving is 1 teaspoon of vegetable oil or 2 tablespoons of salad dressing. · Limit sweets and added sugars to 5 servings or less a week. A serving is 1 tablespoon jelly or jam, ½ cup sorbet, or 1 cup of lemonade. · Eat less than 2,300 milligrams (mg) of sodium a day. If you limit your sodium to 1,500 mg a day, you can lower your blood pressure even more. Tips for success · Start small. Do not try to make dramatic changes to your diet all at once. You might feel that you are missing out on your favorite foods and then be more likely to not follow the plan. Make small changes, and stick with them. Once those changes become habit, add a few more changes. · Try some of the following: ¨ Make it a goal to eat a fruit or vegetable at every meal and at snacks. This will make it easy to get the recommended amount of fruits and vegetables each day. ¨ Try yogurt topped with fruit and nuts for a snack or healthy dessert. ¨ Add lettuce, tomato, cucumber, and onion to sandwiches. ¨ Combine a ready-made pizza crust with low-fat mozzarella cheese and lots of vegetable toppings. Try using tomatoes, squash, spinach, broccoli, carrots, cauliflower, and onions. ¨ Have a variety of cut-up vegetables with a low-fat dip as an appetizer instead of chips and dip. ¨ Sprinkle sunflower seeds or chopped almonds over salads. Or try adding chopped walnuts or almonds to cooked vegetables. ¨ Try some vegetarian meals using beans and peas. Add garbanzo or kidney beans to salads. Make burritos and tacos with mashed chang beans or black beans. Where can you learn more? Go to http://cori-rojas.info/.  
Enter C712 in the search box to learn more about \"DASH Diet: Care Instructions. \" Current as of: March 23, 2016 Content Version: 11.1 © 8558-4986 [employer], [facility]. Care instructions adapted under license by [employer] (which disclaims liability or warranty for this information). If you have questions about a medical condition or this instruction, always ask your healthcare professional. [address] any warranty or liability for your use of this information. Please provide this summary of care documentation to your next provider. Your primary care clinician is listed as Ramírez Rolon. If you have any questions after today's visit, please call [phone].

## 2017-03-28 NOTE — PROGRESS NOTES
Chief Complaint   Patient presents with    Hypertension     1 week follow up     HPI:    This is an 81 y/o female patient wh presents for follow up HTN. Patient was seen by PCP on 3/12/17 for elevated BP. Norvasc was increased to 10 mg daily and she seem to have done well on that. Home BP logged reviewed with pt improved:  148/80, 151/86' 154/88,  118 /81, 149/79, 131/75, 139/81, 112/70 and 126/82    ROS:pertient positives as noted in HPI. All others were negative    Past Medical History:   Diagnosis Date    Diverticulosis     DM type 2 (diabetes mellitus, type 2) ([facility] [address] 2012    Goiter 1994    resolved    Hypercalcemia     nl PTH    Hyperlipidemia 2015    Hypertension 2010    Immunization refused     pt doesn't want any shots       Social History     Social History    Marital status: UNKNOWN     Spouse name: N/A    Number of children: N/A    Years of education: N/A     Occupational History    retired nurse      Social History Main Topics    Smoking status: Former Smoker     Packs/day: 0.25     Years: 1.00     Types: Cigarettes    Smokeless tobacco: Never Used    Alcohol use No    Drug use: No    Sexual activity: No      Comment:      Other Topics Concern    Not on file     Social History Narrative     Current Outpatient Prescriptions   Medication Sig Dispense Refill    amLODIPine (NORVASC) 10 mg tablet Take 1 Tab by mouth daily. 90 Tab 1    atorvastatin (LIPITOR) 20 mg tablet Take 1 Tab by mouth daily. 30 Tab 5    meclizine (ANTIVERT) 25 mg tablet Take 1 Tab by mouth three (3) times daily as needed. 12 Tab 0    SITagliptin (JANUVIA) 100 mg tablet Take 1 Tab by mouth daily. 30 Tab 0    benazepril (LOTENSIN) 40 mg tablet Take 1 Tab by mouth daily. 90 Tab 0    cholecalciferol (VITAMIN D3) 1,000 unit tablet Take 2 Tabs by mouth daily. 60 Tab 11    aspirin delayed-release 81 mg tablet Take  by mouth daily.       Blood-Glucose Meter ([employer] BLOOD GLUCOSE SYSTEM) misc by Does Not Apply route.  omega-3 fatty acids-vitamin e (FISH OIL) 1,000 mg cap One capsule daily. 60 Cap 5     Allergies   Allergen Reactions    Codeine Other (comments)     Felt like a different person, per patient    Iodine Itching    Pcn [Penicillins] Itching    Sulfa (Sulfonamide Antibiotics) Unknown (comments)    Vicodin [Hydrocodone-Acetaminophen] Nausea Only       Physical Exam:    Vital Signs:   Visit Vitals    /72    Pulse 87    Temp 97.8 °F (36.6 °C) (Oral)    Resp 16    Ht 5' 3\" (1.6 m)    Wt 122 lb 8 oz (55.6 kg)    BMI 21.7 kg/m2         General: a, a & o x 3, afebrile,  interacting appropriately, in no acute distress  HEENT: head normocephalic and atraumatic, conjuctiva clear  Respiratory: lung sounds clear bilaterally, good respiratory effort, no wheezes or crackles  Cardiovascular: normal S1S2, regular rate and rhythm, no murmurs        Assessment/Plan:        ICD-10-CM ICD-9-CM    1. Essential hypertension I10 401.9 Continue with current medication           Additional Notes: Discussed today's diagnosis, treatment plans. Discussed medication indications and side effects. After Visit Summary: Provided and discussed printed patient instructions. Answered questions in relation to today's diagnosis.   Follow-up Disposition: keep previous appt with PCP as previously scheduled        Zollie Galeazzi, NP-BC  [address] [place] [address],[address]

## 2017-03-28 NOTE — PATIENT INSTRUCTIONS

## 2017-04-13 ENCOUNTER — HOSPITAL ENCOUNTER (OUTPATIENT)
Dept: PHYSICAL THERAPY | Age: 82
Discharge: HOME | End: 2017-04-13
Payer: MEDICARE

## 2017-04-13 PROCEDURE — 97112 NEUROMUSCULAR REEDUCATION: CPT

## 2017-04-13 PROCEDURE — G8978 MOBILITY CURRENT STATUS: HCPCS

## 2017-04-13 PROCEDURE — G8979 MOBILITY GOAL STATUS: HCPCS

## 2017-04-13 PROCEDURE — 97162 PT EVAL MOD COMPLEX 30 MIN: CPT

## 2017-04-13 NOTE — PROGRESS NOTES
[facility] [address], [address], [address] - Phone: [phone]  Fax: [phone] / [address] [facility]  Patient Name: Michael Gabriel : 1929   Medical   Diagnosis: Ataxic gait [R26.0] Treatment Diagnosis: Ataxic gait   Onset Date: 2016     Referral Source: Simon Rudd MD [facility]): 2017   Prior Hospitalization: See medical history Provider #: [id number]   Prior Level of Function: Denies dizziness and imbalance   Comorbidities: HTN, Hyperlipidemia, Depression, and DM   Medications: Verified on Patient Summary List   The Plan of Care and following information is based on the information from the initial evaluation.    ===========================================================================================  Assessment / key information:  Patient is 80 y.o. yo female who presents to [facility] with diagnosis of Ataxic gait [R26.0]. Patient reports dizziness with transferring from sit to stand and imbalance with ambulation that began about 2016 after ER visit. Upon objective evaluation, patient demonstrates impaired use of vestibular input and hip balance strategies. Patient scored 14/24 on DGI indicating increased risk of fall with ambulatiotn. Bertrum Alu was cleared and negative. JI Lower extremity muscle strength was as follows: hip flexion 4-/5, hip ABD 4-/5, hip ADD 4-/5, hip ext 3+/5, knee flexion 4+/5, knee extension 4+/5, and DF 4/5. Patient scored 48 on FOTO indcating decreased quality of life.  Patient can benefit from PT interventions to decrease r/o fall, improve safety with ADLs, & decrease sx with ADLs & to improve overall functional status.  ==================================================================================  Eval Complexity: History: HIGH Complexity :3+ comorbidities / personal factors will impact the outcome/ POC Exam:HIGH Complexity : 4+ Standardized tests and measures addressing body structure, function, activity limitation and / or participation in recreation  Presentation: MEDIUM Complexity : Evolving with changing characteristics  Clinical Decision Making:MEDIUM Complexity : FOTO score of 26-74Overall Complexity:MEDIUM  Problem List: decrease strength, impaired gait/ balance, decrease ADL/ functional abilitiies, decrease activity tolerance, decrease flexibility/ joint mobility and decrease transfer abilities  Treatment Plan may include any combination of the following: Therapeutic exercise, Therapeutic activities, Neuromuscular re-education, Physical agent/modality, Gait/balance training, Manual therapy and Patient education  Patient / Family readiness to learn indicated by: asking questions, trying to perform skills and interest  Persons(s) to be included in education: patient (P)  Barriers to Learning/Limitations: None  Measures taken:    Patient Goal (s): \"I hope my gait improves\"   Patient self reported health status: good  Rehabilitation Potential: good   Short Term Goals: To be accomplished in  4  Weeks:  1) Patient performing daily HEP and educated in fall prevention techniques. 2) Patient will be able to maintain MSR bunion for 30 seconds eyes open to increase safety with ADLs. 3) Patient will be able to maintain MSR arch for 30 seconds eyes closed to increase safety with showering. 4) Patient to improve score on DGI to 17/24 indicating decreased fall risk with community ambulation.  Long Term Goals: To be accomplished in  8  Weeks:  1) Patient to improve score on FOTO to 73 indicating improved quality of life. 2) Patient to improve score on DGI to 19/24 indicating decreased fall risk with ambulation. 3) Patient to be independent  with HEP. 4) Patient will increase JI knee strength in flexion and quad to 5-/5 so patient has improved ability to navigate stairs.   5) Patient will increase JI hip strength in hip flexion and ABD/ADD to 4/5 so patient has improved ability to perform household chores. Frequency / Duration:   Patient to be seen  2  times per week for 6-8  weeks:  Patient / Caregiver education and instruction: self care, activity modification and exercises  G-Codes (GP): Mobility [id number] Current  CK= 40-59%   Goal  CJ= 20-39%. The severity rating is based on the FOTO Score      Therapist Signature: Maya Reeves Date: 1/53/5116   Certification Period: 4/13/2017 to 7/12/2017 Time: 9:09 AM   ===========================================================================================  I certify that the above Physical Therapy Services are being furnished while the patient is under my care. I agree with the treatment plan and certify that this therapy is necessary. Physician Signature:        Date:       Time:     Please sign and return to [facility] or you may fax the signed copy to [phone]. Thank you.

## 2017-04-13 NOTE — PROGRESS NOTES
PHYSICAL THERAPY - DAILY TREATMENT NOTE    Patient Name: Ascencion Buerger        Date: 2017  : 1929   YES Patient  Verified  Visit #:   1     Insurance: Payor: Hollie Arthurverónica / Plan: [address] [place] [address] HMO / Product Type: Managed Care Medicare /      In time: 2:16pm Out time: 3:08   Total Treatment Time: 52     Medicare Time Tracking (below)   Total Timed Codes (min):  8 1:1 Treatment Time:  8     TREATMENT AREA =  Ataxic gait [R26.0]  SUBJECTIVE  Pain Level (on 0 to 10 scale):  0  / 10   Medication Changes/New allergies or changes in medical history, any new surgeries or procedures? NO    If yes, update Summary List   Subjective Functional Status/Changes:  []  No changes reported     Pt states \"this started about a year ago after i went to the ER\"       OBJECTIVE  Modalities Rationale:   N/a    8 min Neuro Re-education: Education in vestibular rehabilitation program, anatomy and physiology, VSE in sitting and purpose of vestibular adaptation exercise   Rationale:       min Patient Education:  YES  Reviewed HEP   []  Progressed/Changed HEP based on:         Other Objective/Functional Measures:     Justification for Eval Complexity:   Patient History: HIGH Complexity :3+ comorbidities / personal factors will impact the outcome/ POC  (HTN, Hyperlipidemia, Depression, and DM)  Examination:HIGH Complexity : 4+ Standardized tests and measures addressing body structure, function, activity limitation and / or participation in recreation  (See POC and musculoskeletal examination attached,  DGI,(-) Nicola Menard)  Clinical Presentation: MEDIUM Complexity : Evolving with changing characteristics  (pain level 0/10 on average, dizziness and imbalance)  Clinical Decision Making:MEDIUM Complexity : FOTO score of 26-74 (Foto 53/100)   Overall Complexity:MEDIUM     Post Treatment Pain Level (on 0 to 10) scale:   0  / 10     ASSESSMENT  Assessment/Changes in Function:     Pt presented to PT services with decreased JI strength, flexibility, decreased static/dynamic balance, impaired/ataxic gait and would benefit from PT services in order to address the above impairments. []  See Progress Note/Recertification   Patient will continue to benefit from skilled PT services to modify and progress therapeutic interventions, address functional mobility deficits, address ROM deficits, address strength deficits, analyze and address soft tissue restrictions, analyze and cue movement patterns, analyze and modify body mechanics/ergonomics and assess and modify postural abnormalities to attain remaining goals. Progress toward goals / Updated goals:    Progressing towards newly established goals.       PLAN  [x]  Upgrade activities as tolerated YES Continue plan of care   []  Discharge due to :    []  Other:      Therapist: Dameon Matt DPT     Date: 4/13/2017 Time: 10:58 AM     Future Appointments  Date Time Provider Adelia Rey   4/13/2017 2:00 PM [address]   4/24/2017 9:00 AM Kiara Camejo MD [address] [place] [address] 178   6/21/2017 10:00 AM Niesha Andrea MD [facility]

## 2017-04-26 ENCOUNTER — TELEPHONE (OUTPATIENT)
Dept: FAMILY MEDICINE CLINIC | Age: 82
End: 2017-04-26

## 2017-04-26 NOTE — TELEPHONE ENCOUNTER
Patient calling questioning when and were her urine test were performed. She does remember the lab in which they were received as per patient she did not have the referral paperwork.

## 2017-04-30 ENCOUNTER — HOSPITAL ENCOUNTER (OUTPATIENT)
Dept: LAB | Age: 82
Discharge: HOME | End: 2017-04-30

## 2017-05-01 ENCOUNTER — HOSPITAL ENCOUNTER (OUTPATIENT)
Dept: INFUSION THERAPY | Age: 82
Discharge: HOME | End: 2017-05-01
Payer: MEDICARE

## 2017-05-01 ENCOUNTER — OFFICE VISIT (OUTPATIENT)
Dept: ONCOLOGY | Age: 82
End: 2017-05-01

## 2017-05-01 VITALS
HEART RATE: 82 BPM | DIASTOLIC BLOOD PRESSURE: 68 MMHG | TEMPERATURE: 98.8 F | SYSTOLIC BLOOD PRESSURE: 143 MMHG | RESPIRATION RATE: 18 BRPM

## 2017-05-01 VITALS — RESPIRATION RATE: 18 BRPM | HEART RATE: 82 BPM

## 2017-05-01 DIAGNOSIS — D47.2 MGUS (MONOCLONAL GAMMOPATHY OF UNKNOWN SIGNIFICANCE): ICD-10-CM

## 2017-05-01 DIAGNOSIS — E83.52 HYPERCALCEMIA: ICD-10-CM

## 2017-05-01 DIAGNOSIS — D75.839 THROMBOCYTOSIS: Primary | ICD-10-CM

## 2017-05-01 LAB
ALBUMIN SERPL BCP-MCNC: 4.1 G/DL (ref 3.4–5)
ALBUMIN/GLOB SERPL: 1.1 {RATIO} (ref 0.8–1.7)
ALP SERPL-CCNC: 111 U/L (ref 45–117)
ALT SERPL-CCNC: 23 U/L (ref 13–56)
ANION GAP BLD CALC-SCNC: 7 MMOL/L (ref 3–18)
AST SERPL W P-5'-P-CCNC: 22 U/L (ref 15–37)
BASO+EOS+MONOS # BLD AUTO: 0.7 K/UL (ref 0–2.3)
BASO+EOS+MONOS # BLD AUTO: 9 % (ref 0.1–17)
BILIRUB SERPL-MCNC: 0.3 MG/DL (ref 0.2–1)
BUN SERPL-MCNC: 15 MG/DL (ref 7–18)
BUN/CREAT SERPL: 13 (ref 12–20)
CALCIUM SERPL-MCNC: 10.4 MG/DL (ref 8.5–10.1)
CHLORIDE SERPL-SCNC: 103 MMOL/L (ref 100–108)
CO2 SERPL-SCNC: 29 MMOL/L (ref 21–32)
CREAT SERPL-MCNC: 1.18 MG/DL (ref 0.6–1.3)
DIFFERENTIAL METHOD BLD: NORMAL
ERYTHROCYTE [DISTWIDTH] IN BLOOD BY AUTOMATED COUNT: 13.4 % (ref 11.5–14.5)
GLOBULIN SER CALC-MCNC: 3.7 G/DL (ref 2–4)
GLUCOSE SERPL-MCNC: 182 MG/DL (ref 74–99)
HCT VFR BLD AUTO: 40.2 % (ref 36–48)
HGB BLD-MCNC: 12.8 G/DL (ref 12–16)
LDH SERPL L TO P-CCNC: 196 U/L (ref 81–234)
LYMPHOCYTES # BLD AUTO: 22 % (ref 14–44)
LYMPHOCYTES # BLD: 1.6 K/UL (ref 1.1–5.9)
MCH RBC QN AUTO: 28.3 PG (ref 25–35)
MCHC RBC AUTO-ENTMCNC: 31.8 G/DL (ref 31–37)
MCV RBC AUTO: 88.9 FL (ref 78–102)
NEUTS SEG # BLD: 5.2 K/UL (ref 1.8–9.5)
NEUTS SEG NFR BLD AUTO: 69 % (ref 40–70)
PLATELET # BLD AUTO: 402 K/UL (ref 140–440)
POTASSIUM SERPL-SCNC: 4.2 MMOL/L (ref 3.5–5.5)
PROT SERPL-MCNC: 7.8 G/DL (ref 6.4–8.2)
RBC # BLD AUTO: 4.52 M/UL (ref 4.1–5.1)
SODIUM SERPL-SCNC: 139 MMOL/L (ref 136–145)
WBC # BLD AUTO: 7.5 K/UL (ref 4.5–13)

## 2017-05-01 PROCEDURE — 82232 ASSAY OF BETA-2 PROTEIN: CPT | Performed by: INTERNAL MEDICINE

## 2017-05-01 PROCEDURE — 36415 COLL VENOUS BLD VENIPUNCTURE: CPT

## 2017-05-01 PROCEDURE — 85025 COMPLETE CBC W/AUTO DIFF WBC: CPT | Performed by: INTERNAL MEDICINE

## 2017-05-01 PROCEDURE — 80053 COMPREHEN METABOLIC PANEL: CPT | Performed by: INTERNAL MEDICINE

## 2017-05-01 PROCEDURE — 83615 LACTATE (LD) (LDH) ENZYME: CPT | Performed by: INTERNAL MEDICINE

## 2017-05-01 NOTE — PROGRESS NOTES
[facility] HEMATOLOGY-MEDICAL ONCOLOGY:       Assessment/ Plan:     Patient Active Problem List   Diagnosis Code    Hypertension I10    Hypercalcemia E83.52    Diabetes ([facility] E11.9    Hyperlipidemia E78.5    Goiter E04.9    Diverticulosis K57.90    Immunization refused Z28.21    Diabetes mellitus type 2, controlled [facility] E11.9    Thrombocytosis (HCC) D47.3     1.) MGUS-Monoclonal Gammopathy of Undetermined Significance  -A recent SPEP showed a very small monoclonal protein (0.2 gm/dL-IgG kappa)  -A recent UPEP was WNL  -No evidence of end organ damage  -Rtc. every 3 months for MGUS biomarkers  -Ck. a skeletal survey every 6-12 months       2.) Mild Thrombocytosis (Thrombocythemia)  -Most likely Reactive   -Platelet count slightly elevated  -Iron studies were normal  -Lab w/u not c/w a CMPD (e.g. E.T.-essential thrombocythemia)  -Serum JAK2 was negative  -Rtc. In 3 months to repeat a CBC and for further medical decision making    3.)  Low Vitamin D level/ Mildly Elevated serum Calcium (chronic)  -Cont.vit. 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 I have discussed the diagnosis with the patient and the intended plan. The patient verbalized understanding and agrees with the plan. Subjective:    Denied pain  Appetite stable      History:   Chika Schneider is a 80 y.o. female presenting today for mildly elevated platelet count. Current Outpatient Prescriptions   Medication Sig Dispense Refill    amLODIPine (NORVASC) 10 mg tablet Take 1 Tab by mouth daily. 90 Tab 1    atorvastatin (LIPITOR) 20 mg tablet Take 1 Tab by mouth daily.  30 Tab 5    meclizine (ANTIVERT) 25 mg tablet Take 1 Tab by mouth three (3) times daily as needed. 12 Tab 0    SITagliptin (JANUVIA) 100 mg tablet Take 1 Tab by mouth daily. 30 Tab 0    benazepril (LOTENSIN) 40 mg tablet Take 1 Tab by mouth daily. 90 Tab 0    cholecalciferol (VITAMIN D3) 1,000 unit tablet Take 2 Tabs by mouth daily. 60 Tab 11    aspirin delayed-release 81 mg tablet Take  by mouth daily.  Blood-Glucose Meter (EMBRACE BLOOD GLUCOSE SYSTEM) misc by Does Not Apply route.  omega-3 fatty acids-vitamin e (FISH OIL) 1,000 mg cap One capsule daily. 60 Cap 5       Objective:   VS:      Vitals:    05/01/17 1512   Pulse: 82   Resp: 18        Physical Exam:     Constitutional:  well developed, well nourished, no acute distress and alert and oriented x 3    HENT:  Oral mucosa pink and moist, no cyanosis observed and no pallor observed.    Eyes:  conjunctiva normal, upper and lower eyelid normal bilaterally.    Neck:  No jugular venous distension present.    Cardiovascular:  heart sounds normal, normal rate and regular rhythm   Pulmonary/Chest Wall:  breath sounds are clear bilaterally     Abdominal:  Non tender, no palpable masses, no hepatosplenomegaly, and no abdominal bruits.        Musculoskeletal:  No digital clubbing or cyanosis. Normal muscle strength and tone.    Skin:  Normal and no rashes or lesions    Peripheral Vascular:  No edema present in extremities. Femoral pulse normal bilaterally. Dorsalis pedis pulse normal bilaterally.        Review of Systems    Review of Systems - History obtained from the patient  General ROS: positive for  - malaise  Psychological ROS: negative for - anxiety  Ophthalmic ROS: negative for - blurry vision  ENT ROS: negative for - epistaxis  Allergy and Immunology ROS: negative  negative for - hives  Hematological and Lymphatic ROS: negative for - bruising  Endocrine ROS: negative for - skin changes  Breast ROS: negative for breast lumps  Respiratory ROS: negative for - cough  Cardiovascular ROS: negative for - chest pain  Gastrointestinal ROS: no abdominal pain, change in bowel habits, or black or bloody stools  negative for - abdominal pain  Genito-Urinary ROS: negative for - dysuria  Musculoskeletal ROS: negative  negative for - joint pain  Neurological ROS: negative for - confusion, + for dizziness-intermittent  Dermatological ROS: negative  negative for - pruritus or rash        No results found for this or any previous visit (from the past 168 hour(s)).     Past Medical History:   Diagnosis Date    Diverticulosis     DM type 2 (diabetes mellitus, type 2) ([facility] [address] 2012    Goiter 1994    resolved    Hypercalcemia     nl PTH    Hyperlipidemia 2015    Hypertension 2010    Immunization refused     pt doesn't want any shots       Past Surgical History:   Procedure Laterality Date    HX CATARACT REMOVAL Bilateral 2000    Dr. Wolf Box  2014   [address]    Patient states Total hysterectomy       Social History     Social History    Marital status: UNKNOWN     Spouse name: N/A    Number of children: N/A    Years of education: N/A     Occupational History    retired nurse      Social History Main Topics    Smoking status: Former Smoker     Packs/day: 0.25     Years: 1.00     Types: Cigarettes    Smokeless tobacco: Never Used    Alcohol use No    Drug use: No    Sexual activity: No      Comment:      Other Topics Concern    Not on file     Social History Narrative       Family History   Problem Relation Age of Onset    Cancer Father     Breast Cancer Sister        Allergies   Allergen Reactions    Codeine Other (comments)     Felt like a different person, per patient    Iodine Itching    Pcn [Penicillins] Itching    Sulfa (Sulfonamide Antibiotics) Unknown (comments)    Vicodin [Hydrocodone-Acetaminophen] Nausea Only       Follow-up Disposition: Not on File    Miquel Bullock MD Hematology-Medical Oncology

## 2017-05-01 NOTE — PROGRESS NOTES
[facility] Progress Note    Date: May 1, 2017    Name: Anna Rivas    MRN: [id number]         : 1929      Ms. Dalila Stevens was assessed and education was provided. Ms. Shari Joseph vitals were reviewed and patient was observed for 5 minutes prior to treatment. Visit Vitals    /68    Pulse 82    Temp 98.8 °F (37.1 °C)    Resp 18       Lab results were obtained and reviewed. Recent Results (from the past 12 hour(s))   METABOLIC PANEL, COMPREHENSIVE    Collection Time: 17  3:50 PM   Result Value Ref Range    Sodium 139 136 - 145 mmol/L    Potassium 4.2 3.5 - 5.5 mmol/L    Chloride 103 100 - 108 mmol/L    CO2 29 21 - 32 mmol/L    Anion gap 7 3.0 - 18 mmol/L    Glucose 182 (H) 74 - 99 mg/dL    BUN 15 7.0 - 18 MG/DL    Creatinine 1.18 0.6 - 1.3 MG/DL    BUN/Creatinine ratio 13 12 - 20      GFR est AA 53 (L) >60 ml/min/1.73m2    GFR est non-AA 43 (L) >60 ml/min/1.73m2    Calcium 10.4 (H) 8.5 - 10.1 MG/DL    Bilirubin, total 0.3 0.2 - 1.0 MG/DL    ALT (SGPT) 23 13 - 56 U/L    AST (SGOT) 22 15 - 37 U/L    Alk. phosphatase 111 45 - 117 U/L    Protein, total 7.8 6.4 - 8.2 g/dL    Albumin 4.1 3.4 - 5.0 g/dL    Globulin 3.7 2.0 - 4.0 g/dL    A-G Ratio 1.1 0.8 - 1.7     CBC WITH 3 PART DIFF    Collection Time: 17  3:55 PM   Result Value Ref Range    WBC 7.5 4.5 - 13.0 K/uL    RBC 4.52 4.10 - 5.10 M/uL    HGB 12.8 12.0 - 16.0 g/dL    HCT 40.2 36 - 48 %    MCV 88.9 78 - 102 FL    MCH 28.3 25.0 - 35.0 PG    MCHC 31.8 31 - 37 g/dL    RDW 13.4 11.5 - 14.5 %    PLATELET 942 946 - 374 K/uL    NEUTROPHILS 69 40 - 70 %    MIXED CELLS 9 0.1 - 17 %    LYMPHOCYTES 22 14 - 44 %    ABS. NEUTROPHILS 5.2 1.8 - 9.5 K/UL    ABS. MIXED CELLS 0.7 0.0 - 2.3 K/uL    ABS. LYMPHOCYTES 1.6 1.1 - 5.9 K/UL    DF AUTOMATED         Venipuncture to Left antecubital vein x1 attempt using 23g butterfly collection set. Specimen sent to lab for cbc w/diff, CMP, immunophenotyping, Beta 2 microglobulin.     Ms. Conner Hilda tolerated the lab draw, and had no complaints. Patient armband removed and shredded. Ms. Angel Butterfield was discharged from [place] [id number] in stable condition at 1600. She is to follow up with physician for her next appointment.     Elisabeth Arreguin RN  May 1, 2017  4:57 PM

## 2017-05-01 NOTE — MR AVS SNAPSHOT
Visit Information Date & Time Provider Department Dept. Phone Encounter #  
 5/1/2017  2:00 PM Keira Quintero  [facility] Oncology [phone] [id number] Your Appointments 8/1/2017  1:30 PM  
Follow Up with MD Sylvain Stark [facility] Oncology [facility] Appt Note: 3 month follow up  
 [phone] [id number]  
[address]  
  
    
 3/21/2018  9:30 AM  
Office Visit with Apolonia Reid MD  
[address] [facility] [address]  
  
   
 [address] Upcoming Health Maintenance Date Due  
 FOOT EXAM Q1 12/8/2016 EYE EXAM RETINAL OR DILATED Q1 3/28/2017 Pneumococcal 65+ Low/Medium Risk (2 of 2 - PPSV23) 6/17/2017 INFLUENZA AGE 9 TO ADULT 8/1/2017 HEMOGLOBIN A1C Q6M 9/20/2017 MICROALBUMIN Q1 12/16/2017 LIPID PANEL Q1 3/4/2018 MEDICARE YEARLY EXAM 3/21/2018 GLAUCOMA SCREENING Q2Y 3/28/2018 DTaP/Tdap/Td series (2 - Td) 6/17/2026 Allergies as of 5/1/2017  Review Complete On: 5/1/2017 By: Keira Quintero MD  
  
 Severity Noted Reaction Type Reactions Codeine  11/10/2015    Other (comments) Felt like a different person, per patient Iodine  11/10/2015    Itching Pcn [Penicillins]  11/10/2015    Itching Sulfa (Sulfonamide Antibiotics)  11/10/2015    Unknown (comments) Vicodin [Hydrocodone-acetaminophen]  11/10/2015    Nausea Only Current Immunizations  Reviewed on 6/17/2016 No immunizations on file. Not reviewed this visit You Were Diagnosed With   
  
 Codes Comments Thrombocytosis ([facility]    -  Primary ICD-10-CM: D47.3 ICD-9-CM: 238.71 Hypercalcemia     ICD-10-CM: A63.42 
ICD-9-CM: 275.42 Vitals Pulse Resp OB Status Smoking Status [age] Hysterectomy Never Smoker Preferred Pharmacy Pharmacy Name Phone [id number] [phone] Your Updated Medication List  
  
   
This list is accurate as of: 5/1/17  3:40 PM.  Always use your most recent med list. amLODIPine 10 mg tablet Commonly known as:  Victoria Half Take 1 Tab by mouth daily. aspirin delayed-release 81 mg tablet Take  by mouth daily. atorvastatin 20 mg tablet Commonly known as:  LIPITOR Take 1 Tab by mouth daily. benazepril 40 mg tablet Commonly known as:  LOTENSIN Take 1 Tab by mouth daily. cholecalciferol 1,000 unit tablet Commonly known as:  VITAMIN D3 Take 2 Tabs by mouth daily. [address] Generic drug:  Blood-Glucose Meter  
by Does Not Apply route. FISH OIL 1,000 mg Cap Generic drug:  omega-3 fatty acids-vitamin e One capsule daily. meclizine 25 mg tablet Commonly known as:  ANTIVERT Take 1 Tab by mouth three (3) times daily as needed. SITagliptin 100 mg tablet Commonly known as:  Leata Marinelli Take 1 Tab by mouth daily. To-Do List   
 05/01/2017 Lab:  CBC WITH AUTOMATED DIFF   
  
 05/01/2017 Lab:  METABOLIC PANEL, COMPREHENSIVE Please provide this summary of care documentation to your next provider. Your primary care clinician is listed as Susannah Ahumada. If you have any questions after today's visit, please call [phone].

## 2017-05-01 NOTE — TELEPHONE ENCOUNTER
Third attempt to reach patient, no answer.  Left voice message advising patient to call office back to discuss

## 2017-05-02 ENCOUNTER — TELEPHONE (OUTPATIENT)
Dept: FAMILY MEDICINE CLINIC | Age: 82
End: 2017-05-02

## 2017-05-02 LAB — B2 MICROGLOB SERPL-MCNC: 2.3 MG/L (ref 0.6–2.4)

## 2017-05-02 PROCEDURE — 88184 FLOWCYTOMETRY/ TC 1 MARKER: CPT | Performed by: PSYCHIATRY & NEUROLOGY

## 2017-05-02 PROCEDURE — 88185 FLOWCYTOMETRY/TC ADD-ON: CPT | Performed by: PSYCHIATRY & NEUROLOGY

## 2017-05-02 NOTE — PROGRESS NOTES
[address] PHYSICAL THERAPY  [facility] - Phone: [phone]  Fax: [phone] FOR PHYSICAL THERAPY          Patient Name: Jaycee Leggett : 1929   Treatment/Medical Diagnosis: Ataxic gait [R26.0]   Onset Date: 2016    Referral Source: Jennifer Hicks MD [facility]): 2017   Prior Hospitalization: See Medical History Provider #: [id number]   Prior Level of Function: Independent steady ambulation without dizziness   Comorbidities: HTN, Hyperlipidemia, Depression, and DM   Medications: Verified on Patient Summary List   Visits from [facility]: 1 Missed Visits: 0     Goal/Measure of Progress Goal Met? 1. Patient performing daily HEP and educated in fall prevention techniques. Status at last Eval: Goal Establisjed Current Status: Unable to be assessed no   2. Patient will be able to maintain MSR bunion for 30 seconds eyes open to increase safety with ADLs. Status at last Eval: R/L Sharpened Romberg = 23/6 sec Current Status: Unable to be assessed no   3. Patient will be able to maintain MSR arch for 30 seconds eyes closed to increase safety with showering. Status at last Eval: Romberg EC = 30 sec Current Status: Unable to be assessed no   4. Patient to improve score on DGI to 17/24 indicating decreased fall risk with community ambulation. Status at last Eval: DGI = 14/24 Current Status: Unable to be assessed no     Key Functional Changes/Progress: Unable to be formally assessed secondary to pt not returning to PT after initial evaluation secondary to high co-pay. G-Codes (GP): n/a  Assessments/Recommendations: Other: High Co-pay  If you have any questions/comments please contact us directly at  [phone]. Thank you for allowing us to assist in the care of your patient.     Therapist Signature: Mary Khanna Date: 2017   Reporting Period: 2017 to 2017 Time: 11:27 AM

## 2017-05-02 NOTE — TELEPHONE ENCOUNTER
[address] sent in a fax for [facility] [employer] [facility] True Matrix Test Strips . ...  See Folder (not lisetd on medications list)

## 2017-05-03 RX ORDER — ISOPROPYL ALCOHOL 70 ML/100ML
SWAB TOPICAL
Qty: 100 PAD | Refills: 3 | Status: SHIPPED | OUTPATIENT
Start: 2017-05-03 | End: 2018-04-12 | Stop reason: SDUPTHER

## 2017-05-04 ENCOUNTER — TELEPHONE (OUTPATIENT)
Dept: FAMILY MEDICINE CLINIC | Age: 82
End: 2017-05-04

## 2017-07-11 ENCOUNTER — OFFICE VISIT (OUTPATIENT)
Dept: FAMILY MEDICINE CLINIC | Age: 82
End: 2017-07-11

## 2017-07-11 VITALS
OXYGEN SATURATION: 97 % | WEIGHT: 124 LBS | RESPIRATION RATE: 16 BRPM | BODY MASS INDEX: 21.97 KG/M2 | DIASTOLIC BLOOD PRESSURE: 80 MMHG | SYSTOLIC BLOOD PRESSURE: 148 MMHG | TEMPERATURE: 97.9 F | HEIGHT: 63 IN | HEART RATE: 84 BPM

## 2017-07-11 DIAGNOSIS — E11.9 CONTROLLED TYPE 2 DIABETES MELLITUS WITHOUT COMPLICATION, WITHOUT LONG-TERM CURRENT USE OF INSULIN (HCC): Primary | ICD-10-CM

## 2017-07-11 NOTE — PROGRESS NOTES
Chief Complaint   Patient presents with    Follow-up     medication refill      HPI:    This is an 79 y/o female patient wh presents for medication refill. She is doing well with no unusual complain. BP today 140/80    Last A1C: 6.3 at goal for diabetes        ROS:pertient positives as noted in HPI. All others were negative      Past Medical History:   Diagnosis Date    Diverticulosis     DM type 2 (diabetes mellitus, type 2) ([address] 2012    Goiter 1994    resolved    Hypercalcemia     nl PTH    Hyperlipidemia 2015    Hypertension 2010    Immunization refused     pt doesn't want any shots       Social History     Social History    Marital status: UNKNOWN     Spouse name: N/A    Number of children: N/A    Years of education: N/A     Occupational History    retired nurse      Social History Main Topics    Smoking status: Never Smoker    Smokeless tobacco: Never Used    Alcohol use No    Drug use: No    Sexual activity: No      Comment:      Other Topics Concern    Not on file     Social History Narrative     Current Outpatient Prescriptions   Medication Sig Dispense Refill    glucose blood VI test strips (TRUE METRIX GLUCOSE TEST STRIP) strip Test blood sugar 1 time daily 100 Strip 3    alcohol swabs (BD SINGLE USE SWABS REGULAR) padm Use to clean finger to test blood sugar 1 time daily. 100 Pad 3    amLODIPine (NORVASC) 10 mg tablet Take 1 Tab by mouth daily. 90 Tab 1    atorvastatin (LIPITOR) 20 mg tablet Take 1 Tab by mouth daily. 30 Tab 5    meclizine (ANTIVERT) 25 mg tablet Take 1 Tab by mouth three (3) times daily as needed. 12 Tab 0    SITagliptin (JANUVIA) 100 mg tablet Take 1 Tab by mouth daily. 30 Tab 0    benazepril (LOTENSIN) 40 mg tablet Take 1 Tab by mouth daily. 90 Tab 0    cholecalciferol (VITAMIN D3) 1,000 unit tablet Take 2 Tabs by mouth daily. 60 Tab 11    aspirin delayed-release 81 mg tablet Take  by mouth daily.       Blood-Glucose Meter [facility] BLOOD GLUCOSE SYSTEM) misc by Does Not Apply route.  omega-3 fatty acids-vitamin e (FISH OIL) 1,000 mg cap One capsule daily. 60 Cap 5     Allergies   Allergen Reactions    Codeine Other (comments)     Felt like a different person, per patient    Iodine Itching    Pcn [Penicillins] Itching    Sulfa (Sulfonamide Antibiotics) Unknown (comments)    Vicodin [Hydrocodone-Acetaminophen] Nausea Only       Physical Exam:    Vital Signs:     Visit Vitals    /80    Pulse 84    Temp 97.9 °F (36.6 °C) (Oral)    Resp 16    Ht 5' 3\" (1.6 m)    Wt 124 lb (56.2 kg)    SpO2 97%    BMI 21.97 kg/m2             General: a, a & o x 3, afebrile,  interacting appropriately, in no acute distress  HEENT: head normocephalic and atraumatic, conjuctiva clear  Respiratory: lung sounds clear bilaterally, good respiratory effort, no wheezes or crackles  Cardiovascular: normal S1S2, regular rate and rhythm, no murmurs        Assessment/Plan:      ICD-10-CM ICD-9-CM    1. Controlled type 2 diabetes mellitus without complication, without long-term current use of insulin ([facility]) E11.9 250.00 SITagliptin (JANUVIA) 100 mg tablet           Additional Notes: Discussed today's diagnosis, treatment plans. Discussed medication indications and side effects. After Visit Summary: Provided and discussed printed patient instructions. Answered questions in relation to today's diagnosis.   Follow-up Disposition: keep previous appt with PCP as previously scheduled        Kiara Tolentino NP-BC  [address] [facility],[address]        Orders Placed This Encounter    SITagliptin (JANUVIA) 100 mg tablet

## 2017-07-11 NOTE — PATIENT INSTRUCTIONS

## 2017-07-11 NOTE — MR AVS SNAPSHOT
Visit Information Date & Time Provider Department Dept. Phone Encounter #  
 7/11/2017  3:30 PM Cody Sharpe NP [address] 13 [id number] Follow-up Instructions Return if symptoms worsen or fail to improve, for keep previously scheduled appt with your PCP. Your Appointments 8/1/2017  1:30 PM  
Follow Up with Poncho Oleary MD  
[facility] [address]) Appt Note: 3 month follow up  
 [address] [id number]  
[address]  
  
    
 3/21/2018  9:30 AM  
Office Visit with Maddie Payne MD  
[facility] 23 [address]) [facility] [address]ano. [address]  
  
   
 [address] Upcoming Health Maintenance Date Due Pneumococcal 65+ High/Highest Risk (1 of 2 - PCV13) 12/22/1994 FOOT EXAM Q1 12/8/2016 EYE EXAM RETINAL OR DILATED Q1 3/28/2017 INFLUENZA AGE 9 TO ADULT 8/1/2017 HEMOGLOBIN A1C Q6M 9/20/2017 MICROALBUMIN Q1 12/16/2017 LIPID PANEL Q1 3/4/2018 MEDICARE YEARLY EXAM 3/21/2018 GLAUCOMA SCREENING Q2Y 3/28/2018 DTaP/Tdap/Td series (2 - Td) 6/17/2026 Allergies as of 7/11/2017  Review Complete On: 7/11/2017 By: Louann Abts, LPN Severity Noted Reaction Type Reactions Codeine  11/10/2015    Other (comments) Felt like a different person, per patient Iodine  11/10/2015    Itching Pcn [Penicillins]  11/10/2015    Itching Sulfa (Sulfonamide Antibiotics)  11/10/2015    Unknown (comments) Vicodin [Hydrocodone-acetaminophen]  11/10/2015    Nausea Only Current Immunizations  Reviewed on 6/17/2016 No immunizations on file. Not reviewed this visit You Were Diagnosed With   
  
 Codes Comments  Controlled type 2 diabetes mellitus without complication, without long-term current use of insulin ([facility]    -  Primary ICD-10-CM: E11.9 ICD-9-CM: 250.00 Vitals BP Pulse Temp Resp Height(growth percentile) Weight(growth percentile) 148/80 84 97.9 °F (36.6 °C) (Oral) 16 5' 3\" (1.6 m) 124 lb (56.2 kg) SpO2 BMI OB Status Smoking Status 97% 21.97 kg/m2 Hysterectomy Never Smoker BMI and BSA Data Body Mass Index Body Surface Area  
 21.97 kg/m 2 1.58 m 2 Preferred Pharmacy Pharmacy Name Phone [phone] Your Updated Medication List  
  
   
This list is accurate as of: 7/11/17  4:00 PM.  Always use your most recent med list.  
  
  
  
  
 alcohol swabs Padm Commonly known as:  BD Single Use Swabs Regular Use to clean finger to test blood sugar 1 time daily. amLODIPine 10 mg tablet Commonly known as:  [place]le[place] Cheng Take 1 Tab by mouth daily. aspirin delayed-release 81 mg tablet Take  by mouth daily. atorvastatin 20 mg tablet Commonly known as:  LIPITOR Take 1 Tab by mouth daily. benazepril 40 mg tablet Commonly known as:  LOTENSIN Take 1 Tab by mouth daily. cholecalciferol 1,000 unit tablet Commonly known as:  VITAMIN D3 Take 2 Tabs by mouth daily. [id number] [address] Costello Generic drug:  Blood-Glucose Meter  
by Does Not Apply route. FISH OIL 1,000 mg Cap Generic drug:  omega-3 fatty acids-vitamin e One capsule daily. glucose blood VI test strips strip Commonly known as:  TRUE METRIX GLUCOSE TEST STRIP Test blood sugar 1 time daily  
  
 meclizine 25 mg tablet Commonly known as:  ANTIVERT Take 1 Tab by mouth three (3) times daily as needed. SITagliptin 100 mg tablet Commonly known as:  Jessica Garza Take 1 Tab by mouth daily. Prescriptions Sent to Pharmacy Refills SITagliptin (JANUVIA) 100 mg tablet 0 Sig: Take 1 Tab by mouth daily.   
 Class: Normal  
 Pharmacy: [facility] [address] Ph #: [phone] Route: Oral  
  
Follow-up Instructions Return if symptoms worsen or fail to improve, for keep previously scheduled appt with your PCP. Patient Instructions DASH Diet: Care Instructions Your Care Instructions The DASH diet is an eating plan that can help lower your blood pressure. DASH stands for Dietary Approaches to Stop Hypertension. Hypertension is high blood pressure. The DASH diet focuses on eating foods that are high in calcium, potassium, and magnesium. These nutrients can lower blood pressure. The foods that are highest in these nutrients are fruits, vegetables, low-fat dairy products, nuts, seeds, and legumes. But taking calcium, potassium, and magnesium supplements instead of eating foods that are high in those nutrients does not have the same effect. The DASH diet also includes whole grains, fish, and poultry. The DASH diet is one of several lifestyle changes your doctor may recommend to lower your high blood pressure. Your doctor may also want you to decrease the amount of sodium in your diet. Lowering sodium while following the DASH diet can lower blood pressure even further than just the DASH diet alone. Follow-up care is a key part of your treatment and safety. Be sure to make and go to all appointments, and call your doctor if you are having problems. It's also a good idea to know your test results and keep a list of the medicines you take. How can you care for yourself at home? Following the DASH diet · Eat 4 to 5 servings of fruit each day. A serving is 1 medium-sized piece of fruit, ½ cup chopped or canned fruit, 1/4 cup dried fruit, or 4 ounces (½ cup) of fruit juice. Choose fruit more often than fruit juice. · Eat 4 to 5 servings of vegetables each day.  A serving is 1 cup of lettuce or raw leafy vegetables, ½ cup of chopped or cooked vegetables, or 4 ounces (½ cup) of vegetable juice. Choose vegetables more often than vegetable juice. · Get 2 to 3 servings of low-fat and fat-free dairy each day. A serving is 8 ounces of milk, 1 cup of yogurt, or 1 ½ ounces of cheese. · Eat 6 to 8 servings of grains each day. A serving is 1 slice of bread, 1 ounce of dry cereal, or ½ cup of cooked rice, pasta, or cooked cereal. Try to choose whole-grain products as much as possible. · Limit lean meat, poultry, and fish to 2 servings each day. A serving is 3 ounces, about the size of a deck of cards. · Eat 4 to 5 servings of nuts, seeds, and legumes (cooked dried beans, lentils, and split peas) each week. A serving is 1/3 cup of nuts, 2 tablespoons of seeds, or ½ cup of cooked beans or peas. · Limit fats and oils to 2 to 3 servings each day. A serving is 1 teaspoon of vegetable oil or 2 tablespoons of salad dressing. · Limit sweets and added sugars to 5 servings or less a week. A serving is 1 tablespoon jelly or jam, ½ cup sorbet, or 1 cup of lemonade. · Eat less than 2,300 milligrams (mg) of sodium a day. If you limit your sodium to 1,500 mg a day, you can lower your blood pressure even more. Tips for success · Start small. Do not try to make dramatic changes to your diet all at once. You might feel that you are missing out on your favorite foods and then be more likely to not follow the plan. Make small changes, and stick with them. Once those changes become habit, add a few more changes. · Try some of the following: ¨ Make it a goal to eat a fruit or vegetable at every meal and at snacks. This will make it easy to get the recommended amount of fruits and vegetables each day. ¨ Try yogurt topped with fruit and nuts for a snack or healthy dessert. ¨ Add lettuce, tomato, cucumber, and onion to sandwiches. ¨ Combine a ready-made pizza crust with low-fat mozzarella cheese and lots of vegetable toppings.  Try using tomatoes, squash, spinach, broccoli, carrots, cauliflower, and onions. ¨ Have a variety of cut-up vegetables with a low-fat dip as an appetizer instead of chips and dip. ¨ Sprinkle sunflower seeds or chopped almonds over salads. Or try adding chopped walnuts or almonds to cooked vegetables. ¨ Try some vegetarian meals using beans and peas. Add garbanzo or kidney beans to salads. Make burritos and tacos with mashed chang beans or black beans. Where can you learn more? Go to http://coriTPACKrojas.info/. Enter A128 in the search box to learn more about \"DASH Diet: Care Instructions. \" Current as of: April 3, 2017 Content Version: 11.3 © 9336-3889 [employer]. Care instructions adapted under license by [employer] (which disclaims liability or warranty for this information). If you have questions about a medical condition or this instruction, always ask your healthcare professional. [address] any warranty or liability for your use of this information. Please provide this summary of care documentation to your next provider. Your primary care clinician is listed as Yulia Lanier. If you have any questions after today's visit, please call [phone].

## 2017-07-11 NOTE — PROGRESS NOTES
1. Have you been to the ER, urgent care clinic since your last visit? Hospitalized since your last visit? No    2. Have you seen or consulted any other health care providers outside of the [address] [place] [address] since your last visit? Include any pap smears or colon screening.  No

## 2017-10-23 RX ORDER — BENAZEPRIL HYDROCHLORIDE 40 MG/1
40 TABLET ORAL DAILY
Qty: 90 TAB | Refills: 0 | Status: CANCELLED | OUTPATIENT
Start: 2017-10-23

## 2017-11-14 ENCOUNTER — PATIENT OUTREACH (OUTPATIENT)
Dept: FAMILY MEDICINE CLINIC | Age: 82
End: 2017-11-14

## 2017-11-14 NOTE — PROGRESS NOTES
Transitional Care: ED Visit        Patient REFERRED BY Hawa Ortez 11/14/17. Patient discharged from [facility]  Emergency Department on  11/12/17 , diagnosed with Dizziness and Hypertension. Called patient on 11/14/2017, left voice mail for patient to return my call.

## 2017-11-15 ENCOUNTER — PATIENT OUTREACH (OUTPATIENT)
Dept: FAMILY MEDICINE CLINIC | Age: 82
End: 2017-11-15

## 2017-11-15 NOTE — LETTER
11/15/2017 4:14 PM 
 
Ms. Cara [facility] [phone] Dear Cara Daigle, 
 
I am a Nurse Navigator working with [facility] and part of my work is to follow up with patients who seen in the Emergency Department(ED).  I have been unable to reach you by phone. I would like to see how you are doing and schedule follow up from your ED visit. If you could call me at your earliest convenience. I can be reached @ [phone].  
 
 
 
 
 
Sincerely, 
 
 
Shaylee Dickson RN

## 2017-11-15 NOTE — PROGRESS NOTES
Transitional Care Follow up         Called patient, left voice mail message for patient to return my call. I have been unable to reach this patient by phone. A letter is being sent.

## 2017-11-16 ENCOUNTER — PATIENT OUTREACH (OUTPATIENT)
Dept: FAMILY MEDICINE CLINIC | Age: 82
End: 2017-11-16

## 2017-11-16 NOTE — PROGRESS NOTES
Transitional Care Follow up         Returned patient's call. She fell 1 month ago and states\" I have been going down hill since\". Day of her ED visit, sitting down and started to feel her head spin and felt she should go to the ED, notices the dizziness starts after she eats and takes her medications. Agreeable to scheduling FU for her Chronic Disease Management, finances is a barrier, difficult to afford co-pays. . ED /83    DM: Monitors fasting BG daily and records readings Last 3 BG since 11/13/17 [phone], attributes her elevated BG to eating something sweet. Takes Januvia if BG greater then 125, concern about side effects. HTN:Monitors BP daily and records readings. Last 3 BP since 11/1/31/7: 142/97,141/85 and 134/79. Takes Norvasc as needed when Systolic BP > 496      Goals Addressed      Knowledge and adherence of medications. 11/16/2017 Nurse Navigator discussed importance of taking her medications as prescribes for more consistent control of her BG and BP and discuss not having BG and BP controlled also has side effects. Plan; Patient scheduled for FU with Clara Toribio for 11/21/17 @ 12:30 and patient will bring her BG and BP logs.  Will see patient during her FU appointment

## 2017-11-21 ENCOUNTER — OFFICE VISIT (OUTPATIENT)
Dept: FAMILY MEDICINE CLINIC | Age: 82
End: 2017-11-21

## 2017-11-21 ENCOUNTER — PATIENT OUTREACH (OUTPATIENT)
Dept: FAMILY MEDICINE CLINIC | Age: 82
End: 2017-11-21

## 2017-11-21 VITALS
TEMPERATURE: 97.4 F | SYSTOLIC BLOOD PRESSURE: 152 MMHG | DIASTOLIC BLOOD PRESSURE: 86 MMHG | HEART RATE: 74 BPM | OXYGEN SATURATION: 100 % | WEIGHT: 122.6 LBS | HEIGHT: 63 IN | BODY MASS INDEX: 21.72 KG/M2 | RESPIRATION RATE: 16 BRPM

## 2017-11-21 DIAGNOSIS — I10 ESSENTIAL HYPERTENSION: ICD-10-CM

## 2017-11-21 DIAGNOSIS — E11.9 TYPE 2 DIABETES MELLITUS WITHOUT COMPLICATION, WITHOUT LONG-TERM CURRENT USE OF INSULIN (HCC): Primary | ICD-10-CM

## 2017-11-21 DIAGNOSIS — E78.2 MIXED HYPERLIPIDEMIA: ICD-10-CM

## 2017-11-21 LAB — HBA1C MFR BLD HPLC: 6.7 %

## 2017-11-21 RX ORDER — BENAZEPRIL HYDROCHLORIDE 40 MG/1
40 TABLET ORAL DAILY
Qty: 90 TAB | Refills: 1 | Status: SHIPPED | OUTPATIENT
Start: 2017-11-21 | End: 2018-12-18 | Stop reason: ALTCHOICE

## 2017-11-21 RX ORDER — MELATONIN 10 MG
10 CAPSULE ORAL
Qty: 30 CAP | Refills: 6 | Status: SHIPPED | OUTPATIENT
Start: 2017-11-21 | End: 2018-03-26 | Stop reason: ALTCHOICE

## 2017-11-21 NOTE — PROGRESS NOTES
Esthela Armstrong    CC: Follow up of chronic disease management    HPI:     DMII:  - Patient not sure why she had to come in. Wishes to be on less medications. Reports taking Januvia as a PRN when blood sugar is >125. States she is concerned of having low blood sugar. Reports that her previous PCP told her to take the medication on a as needed basis. - Checks blood sugar at home. Log brought in for review. No episodes of hypoglycemia. All recent readings are elevated  - Reports that she is trying to follow a low sugar diet     HTN:  - Checks BP at home  - Taking benazepril on a daily basis  - Taking Norvasc on a as needed basis. Reported to nurse manager that she only took it when Systolic BP was > 610  - Checks BP at home. Log brought in for review  - No hypotension recorded.  All recent readings not at goal BP of < 130/80    HLD:  - Taking medication as prescribed  - Denies any issues with the medication    ROS: Positive items marked in RED  CON: fever, chills  HEENT: HA, eye pain, ear pain, sore throat  Cardiovascular: palpitations, CP  Resp: cough, SOB  GI: nausea, vomiting, hematochezia, melena  SKIN: rash, itching  : dysuria, hematuria  MS: arthralgias, myalgias  Neuro: numbness, tingling, weakness    Past Medical History:   Diagnosis Date    Diverticulosis     DM type 2 (diabetes mellitus, type 2) ([facility] [address] 2012    Goiter 1994    resolved    Hypercalcemia     nl PTH    Hyperlipidemia 2015    Hypertension 2010    Immunization refused     pt doesn't want any shots       Past Surgical History:   Procedure Laterality Date    HX CATARACT REMOVAL Bilateral 2000    Dr. Gómez Machuca HX COLONOSCOPY  2014     [address]    Patient states Total hysterectomy       Family History   Problem Relation Age of Onset    Cancer Father     Breast Cancer Sister        Social History     Social History    Marital status: UNKNOWN     Spouse name: N/A   [facility] Number of children: N/A    Years of education: N/A     Occupational History    retired nurse      Social History Main Topics    Smoking status: Never Smoker    Smokeless tobacco: Never Used    Alcohol use No    Drug use: No    Sexual activity: No      Comment:      Other Topics Concern    None     Social History Narrative       Allergies   Allergen Reactions    Codeine Other (comments)     Felt like a different person, per patient    Iodine Itching    Pcn [Penicillins] Itching    Sulfa (Sulfonamide Antibiotics) Unknown (comments)    Vicodin [Hydrocodone-Acetaminophen] Nausea Only         Current Outpatient Prescriptions:     MULTIVIT-MIN/IRON/FOLIC/LUTEIN (CENTRUM SILVER WOMEN PO), Take 1 Tab by mouth daily. , Disp: , Rfl:     JANUVIA 100 mg tablet, TAKE 1 TAB BY MOUTH DAILY. , Disp: 30 Tab, Rfl: 3    glucose blood VI test strips (TRUE METRIX GLUCOSE TEST STRIP) strip, Test blood sugar 1 time daily, Disp: 100 Strip, Rfl: 3    alcohol swabs (BD SINGLE USE SWABS REGULAR) padm, Use to clean finger to test blood sugar 1 time daily. , Disp: 100 Pad, Rfl: 3    amLODIPine (NORVASC) 10 mg tablet, Take 1 Tab by mouth daily. , Disp: 90 Tab, Rfl: 1    atorvastatin (LIPITOR) 20 mg tablet, Take 1 Tab by mouth daily. , Disp: 30 Tab, Rfl: 5    benazepril (LOTENSIN) 40 mg tablet, Take 1 Tab by mouth daily. , Disp: 90 Tab, Rfl: 0    aspirin delayed-release 81 mg tablet, Take  by mouth daily. , Disp: , Rfl:     omega-3 fatty acids-vitamin e (FISH OIL) 1,000 mg cap, One capsule daily. , Disp: 60 Cap, Rfl: 5    Blood-Glucose Meter (EMBRACE BLOOD GLUCOSE SYSTEM) misc, by Does Not Apply route., Disp: , Rfl:     Physical Exam:      /86  Pulse 74  Temp 97.4 °F (36.3 °C) (Oral)   Resp 16  Ht 5' 3\" (1.6 m)  Wt 122 lb 9.6 oz (55.6 kg)  SpO2 100%  BMI 21.72 kg/m2    General:  WD, WN, NAD  Eyes: sclera clear bilaterally, no discharge noted, eyelids normal in appearance  HENT: NCAT  Neck: supple, no lymphadenopathy  Lungs: CTAB, Normal respiratory effort and rate  CV: RRR, no MRGs  ABD: soft, non-tender, non-distended, normal bowel sounds  Ext: no peripheral edema or digital cyanosis  Skin: normal temperature, turgor, color, and texture  Psych: alert and oriented to person, place and time, normal affect    Results for Asher Bumpers (MRN [id number]):   Ref. Range 11/21/2017 13:43   Hemoglobin A1c (POC) Latest Units: % 6.7       Assessment/Plan   Cara Daigle is a 80 y.o. female with a PMH significant for DMII, HLD, HTN, presenting for chronic disease management. DMII, Well Controlled:  - At goal HGBA1c of <7%  - Patient advised to take medication as prescribed. Reassured of low risk of hypoglycemia w/ Januvia  - Discussed that she continue to log home glucose and contact office if she has an episode of low blood sugar  - Follow up in 3 months    HTN, Inadequately controlled:  - Likely 2/2 confusion as how to take medication  - Advised to take medication as prescribed.   - Discussed that she continue to log home BP and contact office if she has an episode of low blood pressure  - Follow up in 3 months    HLD:  - Patient informed that she was at a high risk of having an ASCVD, but she was not considered to be in a statin benefit group due to her advanced age  - Discussed stopping the medication, as she was insistent that she wanted to be on less medications  - Patient reported that she did not want to stop taking her statin and would continue taking the medication      Valentino Cyphers, MD  11/21/2017, 1:20 PM

## 2017-11-21 NOTE — PROGRESS NOTES
1. Have you been to the ER, urgent care clinic since your last visit? Hospitalized since your last visit? Yes When: Patient was seen at [facility] November 2017    2. Have you seen or consulted any other health care providers outside of the [address] [place] [address] since your last visit? Include any pap smears or colon screening.  No     Patient here today for a follow-up

## 2017-11-21 NOTE — MR AVS SNAPSHOT
Visit Information Date & Time Provider Department Dept. Phone Encounter #  
 11/21/2017 12:30 PM Michael Cerna, [address] [facility] [id number] Follow-up Instructions Return in about 3 months (around 2/21/2018). Your Appointments 3/21/2018  9:30 AM  
Office Visit with MD Bianca Wade [address] [place] [address]) [address] [facility] [address]  
  
   
 [address] Upcoming Health Maintenance Date Due Pneumococcal 65+ High/Highest Risk (1 of 2 - PCV13) 12/22/1994 FOOT EXAM Q1 12/8/2016 EYE EXAM RETINAL OR DILATED Q1 3/28/2017 Influenza Age 5 to Adult 8/1/2017 HEMOGLOBIN A1C Q6M 9/20/2017 MICROALBUMIN Q1 12/16/2017 LIPID PANEL Q1 3/4/2018 MEDICARE YEARLY EXAM 3/21/2018 GLAUCOMA SCREENING Q2Y 3/28/2018 DTaP/Tdap/Td series (2 - Td) 6/17/2026 Allergies as of 11/21/2017  Review Complete On: 11/21/2017 By: Liv Suero Severity Noted Reaction Type Reactions Codeine  11/10/2015    Other (comments) Felt like a different person, per patient Iodine  11/10/2015    Itching Pcn [Penicillins]  11/10/2015    Itching Sulfa (Sulfonamide Antibiotics)  11/10/2015    Unknown (comments) Vicodin [Hydrocodone-acetaminophen]  11/10/2015    Nausea Only Current Immunizations  Reviewed on 6/17/2016 No immunizations on file. Not reviewed this visit You Were Diagnosed With   
  
 Codes Comments Type 2 diabetes mellitus without complication, without long-term current use of insulin (HCC)    -  Primary ICD-10-CM: E11.9 ICD-9-CM: 250.00 Essential hypertension     ICD-10-CM: I10 
ICD-9-CM: 401.9 Mixed hyperlipidemia     ICD-10-CM: E78.2 ICD-9-CM: 272.2 Vitals BP Pulse Temp Resp Height(growth percentile) Weight(growth percentile) 152/86 74 97.4 °F (36.3 °C) (Oral) 16 5' 3\" (1.6 m) 122 lb 9.6 oz (55.6 kg) SpO2 BMI OB Status Smoking Status 100% 21.72 kg/m2 Hysterectomy Never Smoker Vitals History BMI and BSA Data Body Mass Index Body Surface Area 21.72 kg/m 2 1.57 m 2 Preferred Pharmacy Pharmacy Name Phone [id number]- [address] [phone] Your Updated Medication List  
  
   
This list is accurate as of: 11/21/17  1:42 PM.  Always use your most recent med list.  
  
  
  
  
 alcohol swabs Padm Commonly known as:  BD Single Use Swabs Regular Use to clean finger to test blood sugar 1 time daily. amLODIPine 10 mg tablet Commonly known as:  Elspeth Bakes Take 1 Tab by mouth daily. aspirin delayed-release 81 mg tablet Take  by mouth daily. atorvastatin 20 mg tablet Commonly known as:  LIPITOR Take 1 Tab by mouth daily. benazepril 40 mg tablet Commonly known as:  LOTENSIN Take 1 Tab by mouth daily. CENTRUM SILVER WOMEN PO Take 1 Tab by mouth daily. [address] Generic drug:  Blood-Glucose Meter  
by Does Not Apply route. FISH OIL 1,000 mg Cap Generic drug:  omega-3 fatty acids-vitamin e One capsule daily. glucose blood VI test strips strip Commonly known as:  TRUE METRIX GLUCOSE TEST STRIP Test blood sugar 1 time daily JANUVIA 100 mg tablet Generic drug:  SITagliptin TAKE 1 TAB BY MOUTH DAILY. melatonin 10 mg Cap Take 10 mg by mouth nightly as needed. Prescriptions Sent to Pharmacy Refills  
 benazepril (LOTENSIN) 40 mg tablet 1 Sig: Take 1 Tab by mouth daily. Class: Normal  
 Pharmacy: [facility] [address] Ph #: [phone] Route: Oral  
 melatonin 10 mg cap 6 Sig: Take 10 mg by mouth nightly as needed.   
 Class: Normal  
 Pharmacy: [address] Ph #: [phone] Route: Oral  
  
We Performed the Following AMB POC HEMOGLOBIN A1C [59474 CPT(R)] Follow-up Instructions Return in about 3 months (around 2/21/2018). Please provide this summary of care documentation to your next provider. Your primary care clinician is listed as Nannette Holcomb. If you have any questions after today's visit, please call [phone].

## 2017-11-21 NOTE — PROGRESS NOTES
Transitional Care Follow up         Met with patient durIng her appointment. Brought in her BG and BP log, today's 's and patient took one Januvia. .  Goals Addressed      Knowledge and adherence of medications. 2017 reviewed current medication bottles with her, discarded medications bottles which were  and she is no longer taking. Discussed with , patient takes Januvia when BG > Than 125.              Plan to call her next week

## 2017-12-06 ENCOUNTER — PATIENT OUTREACH (OUTPATIENT)
Dept: FAMILY MEDICINE CLINIC | Age: 82
End: 2017-12-06

## 2017-12-06 NOTE — PROGRESS NOTES
Transitional Care Follow up         Called patient, laying in bed, states, she has no energy and would like recommendation for Vitamin, has Centrum Silver but has stopped taking them. Has felt Deppresed during the Holiday season in the past, niece is getting  at the end of the month. Requested I call her back tomorrow.

## 2017-12-07 ENCOUNTER — PATIENT OUTREACH (OUTPATIENT)
Dept: FAMILY MEDICINE CLINIC | Age: 82
End: 2017-12-07

## 2017-12-07 NOTE — PROGRESS NOTES
Transitional Care Follow up         Discussed with Alferd Dubin patient's c/o of feeling fatigue and requesting suggestion for new Vitamin. Per , patient to re-start Centrum Silver and if fatigue does not resolved, to Follow up with him, information left on patient's voice mail per patient's request with instructions to return my call if she has any questions.

## 2017-12-11 ENCOUNTER — PATIENT OUTREACH (OUTPATIENT)
Dept: FAMILY MEDICINE CLINIC | Age: 82
End: 2017-12-11

## 2017-12-11 NOTE — PROGRESS NOTES
Transitional Care Follow up         Returned patient's call. She is out Meter Strips, requesting refill, she called [employer] mail order, not due for refills until 12/28/17, once a new Rx sent to pharmacy. C/O feeling dizzy when she gets out of bed I the morning, denies dizziness at any other time. Today, fasting  and /90, she states, taking medication daily as prescribed. Re-started Centrum Silver and she states, her dizziness did not resolved, asked her about feeling fatigue, she denies feeling fatigue, she is dizzy? Discussed she needs to come in to be evaluated, she is requesting provider to call Rx to pharmacy. Discussed this conversation with [place], patient needs to come in for appointment. Called patient, left voice mail for patient to return my call.

## 2017-12-12 ENCOUNTER — PATIENT OUTREACH (OUTPATIENT)
Dept: FAMILY MEDICINE CLINIC | Age: 82
End: 2017-12-12

## 2017-12-12 RX ORDER — ATORVASTATIN CALCIUM 20 MG/1
20 TABLET, FILM COATED ORAL DAILY
Qty: 30 TAB | Refills: 5 | Status: SHIPPED | OUTPATIENT
Start: 2017-12-12 | End: 2018-07-02 | Stop reason: SDUPTHER

## 2017-12-12 RX ORDER — AMLODIPINE BESYLATE 10 MG/1
10 TABLET ORAL DAILY
Qty: 90 TAB | Refills: 1 | Status: SHIPPED | OUTPATIENT
Start: 2017-12-12 | End: 2018-01-12 | Stop reason: DRUGHIGH

## 2017-12-12 RX ORDER — BENAZEPRIL HYDROCHLORIDE 40 MG/1
40 TABLET ORAL DAILY
Qty: 90 TAB | Refills: 1 | Status: CANCELLED | OUTPATIENT
Start: 2017-12-12

## 2017-12-13 ENCOUNTER — PATIENT OUTREACH (OUTPATIENT)
Dept: FAMILY MEDICINE CLINIC | Age: 82
End: 2017-12-13

## 2017-12-13 NOTE — PROGRESS NOTES
Transitional Care Follow up         Called patient and scheduled her to see Amparo Woods 12/15/17 @ 1:30pm.Now c/o feeling malaised after she eats and dizzy during the day. This am , /81 and pulse 83. Plan to see patient during her appointment.

## 2017-12-19 ENCOUNTER — PATIENT OUTREACH (OUTPATIENT)
Dept: FAMILY MEDICINE CLINIC | Age: 82
End: 2017-12-19

## 2017-12-19 NOTE — PROGRESS NOTES
Transitional Care Follow up         Patient called inquiring if order for her DM monitering supply was signed and faxed to her pharmacy. Discussed her cancelling her 12/15/17 appointment, importance of evaluation for her c/o dizziness, did not have a ride. Spoke with [address], they are not waiting on anything from us, her shipment is on hold until 12/26/17 when she is due again for a refill, last shipment was sent 10/27/17 ,discussed with patient. Discussed order was witting for her to monitor her BG daily and she is using three month supply in 6 weeks? She states, she found some strips and she will have enough until her shipment arrives, she misplaced them.

## 2018-01-12 ENCOUNTER — OFFICE VISIT (OUTPATIENT)
Dept: FAMILY MEDICINE CLINIC | Age: 83
End: 2018-01-12

## 2018-01-12 VITALS
OXYGEN SATURATION: 97 % | SYSTOLIC BLOOD PRESSURE: 120 MMHG | HEIGHT: 63 IN | BODY MASS INDEX: 21.72 KG/M2 | DIASTOLIC BLOOD PRESSURE: 67 MMHG | TEMPERATURE: 98.1 F | RESPIRATION RATE: 16 BRPM | HEART RATE: 77 BPM | WEIGHT: 122.6 LBS

## 2018-01-12 DIAGNOSIS — I10 ESSENTIAL HYPERTENSION: Primary | ICD-10-CM

## 2018-01-12 DIAGNOSIS — E11.9 CONTROLLED TYPE 2 DIABETES MELLITUS WITHOUT COMPLICATION, WITHOUT LONG-TERM CURRENT USE OF INSULIN (HCC): ICD-10-CM

## 2018-01-12 NOTE — PROGRESS NOTES
[facility]    CC: HTN follow up    HPI:     HTN:  - Admits to not taking her BP medication as prescribed. Rarely takes her medication as she believes her blood pressure has been good. - Has been checking her BP at home. Log brought in for review.  Almost all her BP readings were >130/80 with most at stage 2 hypertension  - Patient confused as to what is considered a high BP and a low BP  - She thought her high blood pressure readings were normal and that her normal BP readings were hypotension  - Denies any side effects from her BP medication    Diabetes:  - Reports taking her medication as prescribed  - Denies any side effects from her medication  - Reports needing more information on a proper diabetic diet    ROS: Positive items marked in RED  CON: fever, chills  Cardiovascular: palpitations, CP  Resp: cough, SOB  GI: nausea, vomiting, diarrhea, abdominal pain  : dysuria, hematuria    Past Medical History:   Diagnosis Date    Diverticulosis     DM type 2 (diabetes mellitus, type 2) ([facility] [address] 2012    Goiter 1994    resolved    Hypercalcemia     nl PTH    Hyperlipidemia 2015    Hypertension 2010    Immunization refused     pt doesn't want any shots       Past Surgical History:   Procedure Laterality Date    HX CATARACT REMOVAL Bilateral 2000    Dr. Annita Cruz HX COLONOSCOPY  2014     [facility] HX TOTAL ABDOMINAL [address]    Patient states Total hysterectomy       Family History   Problem Relation Age of Onset    Cancer Father     Breast Cancer Sister        Social History     Social History    Marital status: UNKNOWN     Spouse name: N/A    Number of children: N/A    Years of education: N/A     Occupational History    retired nurse      Social History Main Topics    Smoking status: Never Smoker    Smokeless tobacco: Never Used    Alcohol use No    Drug use: No    Sexual activity: No      Comment:      Other Topics Concern    None     Social History Narrative       Allergies   Allergen Reactions    Codeine Other (comments)     Felt like a different person, per patient    Iodine Itching    Pcn [Penicillins] Itching    Sulfa (Sulfonamide Antibiotics) Unknown (comments)    Vicodin [Hydrocodone-Acetaminophen] Nausea Only         Current Outpatient Prescriptions:     amLODIPine (NORVASC) 10 mg tablet, Take 1 Tab by mouth daily. , Disp: 90 Tab, Rfl: 1    atorvastatin (LIPITOR) 20 mg tablet, Take 1 Tab by mouth daily. , Disp: 30 Tab, Rfl: 5    benazepril (LOTENSIN) 40 mg tablet, Take 1 Tab by mouth daily. , Disp: 90 Tab, Rfl: 1    melatonin 10 mg cap, Take 10 mg by mouth nightly as needed. , Disp: 30 Cap, Rfl: 6    MULTIVIT-MIN/IRON/FOLIC/LUTEIN (CENTRUM SILVER WOMEN PO), Take 1 Tab by mouth daily. , Disp: , Rfl:     JANUVIA 100 mg tablet, TAKE 1 TAB BY MOUTH DAILY. , Disp: 30 Tab, Rfl: 3    glucose blood VI test strips (TRUE METRIX GLUCOSE TEST STRIP) strip, Test blood sugar 1 time daily, Disp: 100 Strip, Rfl: 3    alcohol swabs (BD SINGLE USE SWABS REGULAR) padm, Use to clean finger to test blood sugar 1 time daily. , Disp: 100 Pad, Rfl: 3    aspirin delayed-release 81 mg tablet, Take  by mouth daily. , Disp: , Rfl:     Blood-Glucose Meter (EMBRACE BLOOD GLUCOSE SYSTEM) misc, by Does Not Apply route., Disp: , Rfl:     omega-3 fatty acids-vitamin e (FISH OIL) 1,000 mg cap, One capsule daily. , Disp: 60 Cap, Rfl: 5    Physical Exam:      /67 (BP 1 Location: Left arm, BP Patient Position: Sitting)  Pulse 77  Temp 98.1 °F (36.7 °C) (Oral)   Resp 16  Ht 5' 3\" (1.6 m)  Wt 122 lb 9.6 oz (55.6 kg)  SpO2 97%  BMI 21.72 kg/m2    General:  WD, WN, NAD  Eyes: sclera clear bilaterally, no discharge noted, eyelids normal in appearance  HENT: NCAT  Lungs: CTAB, Normal respiratory effort and rate  CV: RRR, no MRGs  ABD: soft, non-tender, non-distended, normal bowel sounds  Skin: normal temperature, turgor, color, and texture  Psych: alert and oriented to person, place and time, normal affect      Assessment/Plan     HTN:  - Per home log she has not been at goal BP of <130/80 for diabetic  - Patient educated on normal BP range and hypertensive range.   - Will D/C amlodipine  - Will keep on benazepril  - Patient educated on proper timing when checking her BP  - Return in one week for BP check    DMII, Well Controlled:  - At goal HGBA1c of <7%  - Will continue current medication regimen  - Hand outs given on diabetic meal planning and food guidelines  - Will plan to have her speak with nurse navigator at F/U visit        Farzana Ness MD  1/12/2018, 11:32 AM

## 2018-01-12 NOTE — PATIENT INSTRUCTIONS
Learning About Diabetes Food Guidelines  Your Care Instructions    Meal planning is important to manage diabetes. It helps keep your blood sugar at a target level (which you set with your doctor). You don't have to eat special foods. You can eat what your family eats, including sweets once in a while. But you do have to pay attention to how often you eat and how much you eat of certain foods. You may want to work with a dietitian or a certified diabetes educator (CDE) to help you plan meals and snacks. A dietitian or CDE can also help you lose weight if that is one of your goals. What should you know about eating carbs? Managing the amount of carbohydrate (carbs) you eat is an important part of healthy meals when you have diabetes. Carbohydrate is found in many foods. · Learn which foods have carbs. And learn the amounts of carbs in different foods. ¨ Bread, cereal, pasta, and rice have about 15 grams of carbs in a serving. A serving is 1 slice of bread (1 ounce), ½ cup of cooked cereal, or 1/3 cup of cooked pasta or rice. ¨ Fruits have 15 grams of carbs in a serving. A serving is 1 small fresh fruit, such as an apple or orange; ½ of a banana; ½ cup of cooked or canned fruit; ½ cup of fruit juice; 1 cup of melon or raspberries; or 2 tablespoons of dried fruit. ¨ Milk and no-sugar-added yogurt have 15 grams of carbs in a serving. A serving is 1 cup of milk or 2/3 cup of no-sugar-added yogurt. ¨ Starchy vegetables have 15 grams of carbs in a serving. A serving is ½ cup of mashed potatoes or sweet potato; 1 cup winter squash; ½ of a small baked potato; ½ cup of cooked beans; or ½ cup cooked corn or green peas. · Learn how much carbs to eat each day and at each meal. A dietitian or CDE can teach you how to keep track of the amount of carbs you eat. This is called carbohydrate counting. · If you are not sure how to count carbohydrate grams, use the Plate Method to plan meals.  It is a good, quick way to make sure that you have a balanced meal. It also helps you spread carbs throughout the day. ¨ Divide your plate by types of foods. Put non-starchy vegetables on half the plate, meat or other protein food on one-quarter of the plate, and a grain or starchy vegetable in the final quarter of the plate. To this you can add a small piece of fruit and 1 cup of milk or yogurt, depending on how many carbs you are supposed to eat at a meal.  · Try to eat about the same amount of carbs at each meal. Do not \"save up\" your daily allowance of carbs to eat at one meal.  · Proteins have very little or no carbs per serving. Examples of proteins are beef, chicken, turkey, fish, eggs, tofu, cheese, cottage cheese, and peanut butter. A serving size of meat is 3 ounces, which is about the size of a deck of cards. Examples of meat substitute serving sizes (equal to 1 ounce of meat) are 1/4 cup of cottage cheese, 1 egg, 1 tablespoon of peanut butter, and ½ cup of tofu. How can you eat out and still eat healthy? · Learn to estimate the serving sizes of foods that have carbohydrate. If you measure food at home, it will be easier to estimate the amount in a serving of restaurant food. · If the meal you order has too much carbohydrate (such as potatoes, corn, or baked beans), ask to have a low-carbohydrate food instead. Ask for a salad or green vegetables. · If you use insulin, check your blood sugar before and after eating out to help you plan how much to eat in the future. · If you eat more carbohydrate at a meal than you had planned, take a walk or do other exercise. This will help lower your blood sugar. What else should you know? · Limit saturated fat, such as the fat from meat and dairy products. This is a healthy choice because people who have diabetes are at higher risk of heart disease. So choose lean cuts of meat and nonfat or low-fat dairy products.  Use olive or canola oil instead of butter or shortening when cooking. · Don't skip meals. Your blood sugar may drop too low if you skip meals and take insulin or certain medicines for diabetes. · Check with your doctor before you drink alcohol. Alcohol can cause your blood sugar to drop too low. Alcohol can also cause a bad reaction if you take certain diabetes medicines. Follow-up care is a key part of your treatment and safety. Be sure to make and go to all appointments, and call your doctor if you are having problems. It's also a good idea to know your test results and keep a list of the medicines you take. Where can you learn more? Go to http://cori-rojas.info/. Enter F328 in the search box to learn more about \"Learning About Diabetes Food Guidelines. \"  Current as of: March 13, 2017  Content Version: 11.4  © 5703-7244 [employer]. Care instructions adapted under license by [employer] (which disclaims liability or warranty for this information). If you have questions about a medical condition or this instruction, always ask your healthcare professional. [place] [address] any warranty or liability for your use of this information. Learning About Meal Planning for Diabetes  Why plan your meals? Meal planning can be a key part of managing diabetes. Planning meals and snacks with the right balance of carbohydrate, protein, and fat can help you keep your blood sugar at the target level you set with your doctor. You don't have to eat special foods. You can eat what your family eats, including sweets once in a while. But you do have to pay attention to how often you eat and how much you eat of certain foods. You may want to work with a dietitian or a certified diabetes educator. He or she can give you tips and meal ideas and can answer your questions about meal planning. This health professional can also help you reach a healthy weight if that is one of your goals. What plan is right for you?   Your dietitian or diabetes educator may suggest that you start with the plate format or carbohydrate counting. The plate format  The plate format is a simple way to help you manage how you eat. You plan meals by learning how much space each food should take on a plate. Using the plate format helps you spread carbohydrate throughout the day. It can make it easier to keep your blood sugar level within your target range. It also helps you see if you're eating healthy portion sizes. To use the plate format, you put non-starchy vegetables on half your plate. Add meat or meat substitutes on one-quarter of the plate. Put a grain or starchy vegetable (such as brown rice or a potato) on the final quarter of the plate. You can add a small piece of fruit and some low-fat or fat-free milk or yogurt, depending on your carbohydrate goal for each meal.  Here are some tips for using the plate format:  · Make sure that you are not using an oversized plate. A 9-inch plate is best. Many restaurants use larger plates. · Get used to using the plate format at home. Then you can use it when you eat out. · Write down your questions about using the plate format. Talk to your doctor, a dietitian, or a diabetes educator about your concerns. Carbohydrate counting  With carbohydrate counting, you plan meals based on the amount of carbohydrate in each food. Carbohydrate raises blood sugar higher and more quickly than any other nutrient. It is found in desserts, breads and cereals, and fruit. It's also found in starchy vegetables such as potatoes and corn, grains such as rice and pasta, and milk and yogurt. Spreading carbohydrate throughout the day helps keep your blood sugar levels within your target range. Your daily amount depends on several things, including your weight, how active you are, which diabetes medicines you take, and what your goals are for your blood sugar levels.  A registered dietitian or diabetes educator can help you plan how much carbohydrate to include in each meal and snack. A guideline for your daily amount of carbohydrate is:  · 45 to 60 grams at each meal. That's about the same as 3 to 4 carbohydrate servings. · 15 to 20 grams at each snack. That's about the same as 1 carbohydrate serving. The Nutrition Facts label on packaged foods tells you how much carbohydrate is in a serving of the food. First, look at the serving size on the food label. Is that the amount you eat in a serving? All of the nutrition information on a food label is based on that serving size. So if you eat more or less than that, you'll need to adjust the other numbers. Total carbohydrate is the next thing you need to look for on the label. If you count carbohydrate servings, one serving of carbohydrate is 15 grams. For foods that don't come with labels, such as fresh fruits and vegetables, you'll need a guide that lists carbohydrate in these foods. Ask your doctor, dietitian, or diabetes educator about books or other nutrition guides you can use. If you take insulin, you need to know how many grams of carbohydrate are in a meal. This lets you know how much rapid-acting insulin to take before you eat. If you use an insulin pump, you get a constant rate of insulin during the day. So the pump must be programmed at meals to give you extra insulin to cover the rise in blood sugar after meals. When you know how much carbohydrate you will eat, you can take the right amount of insulin. Or, if you always use the same amount of insulin, you need to make sure that you eat the same amount of carbohydrate at meals. If you need more help to understand carbohydrate counting and food labels, ask your doctor, dietitian, or diabetes educator. How do you get started with meal planning? Here are some tips to get started:  · Plan your meals a week at a time. Don't forget to include snacks too. · Use cookbooks or online recipes to plan several main meals.  Plan some quick meals for busy nights. You also can double some recipes that freeze well. Then you can save half for other busy nights when you don't have time to cook. · Make sure you have the ingredients you need for your recipes. If you're running low on basic items, put these items on your shopping list too. · List foods that you use to make breakfasts, lunches, and snacks. List plenty of fruits and vegetables. · Post this list on the refrigerator. Add to it as you think of more things you need. · Take the list to the store to do your weekly shopping. Follow-up care is a key part of your treatment and safety. Be sure to make and go to all appointments, and call your doctor if you are having problems. It's also a good idea to know your test results and keep a list of the medicines you take. Where can you learn more? Go to http://cori-rojas.info/. Reanna Molina in the search box to learn more about \"Learning About Meal Planning for Diabetes. \"  Current as of: March 13, 2017  Content Version: 11.4  © 2264-2489 [employer]. Care instructions adapted under license by [employer] (which disclaims liability or warranty for this information). If you have questions about a medical condition or this instruction, always ask your healthcare professional. [address] any warranty or liability for your use of this information.

## 2018-01-12 NOTE — PROGRESS NOTES
1. Have you been to the ER, urgent care clinic since your last visit? Hospitalized since your last visit? Yes When: Patient was seen at [facility] in December 2017    2. Have you seen or consulted any other health care providers outside of the [address] [place] [address] since your last visit? Include any pap smears or colon screening. No       Patient here today for BP readings are low and dizzy times one month.

## 2018-01-22 ENCOUNTER — CLINICAL SUPPORT (OUTPATIENT)
Dept: FAMILY MEDICINE CLINIC | Age: 83
End: 2018-01-22

## 2018-01-22 VITALS — DIASTOLIC BLOOD PRESSURE: 72 MMHG | HEART RATE: 92 BPM | SYSTOLIC BLOOD PRESSURE: 129 MMHG

## 2018-01-22 DIAGNOSIS — Z01.30 BLOOD PRESSURE CHECK: Primary | ICD-10-CM

## 2018-01-22 RX ORDER — AMLODIPINE BESYLATE 5 MG/1
5 TABLET ORAL DAILY
COMMUNITY
End: 2018-09-20 | Stop reason: SDUPTHER

## 2018-01-22 NOTE — PROGRESS NOTES
Per Dr. Saskia Restrepo patient should continue taking benazepril 40 mg and amlodipine 5 mg as prescribed and continue blood pressure log. Patient verbalized understanding of instructions.

## 2018-01-30 ENCOUNTER — OFFICE VISIT (OUTPATIENT)
Dept: FAMILY MEDICINE CLINIC | Age: 83
End: 2018-01-30

## 2018-01-30 VITALS
RESPIRATION RATE: 16 BRPM | HEIGHT: 63 IN | BODY MASS INDEX: 21.79 KG/M2 | DIASTOLIC BLOOD PRESSURE: 79 MMHG | SYSTOLIC BLOOD PRESSURE: 147 MMHG | HEART RATE: 60 BPM | WEIGHT: 123 LBS | TEMPERATURE: 97.7 F | OXYGEN SATURATION: 97 %

## 2018-01-30 DIAGNOSIS — R54 AGE-RELATED PHYSICAL DEBILITY: ICD-10-CM

## 2018-01-30 DIAGNOSIS — R41.81 SENILITY: Primary | ICD-10-CM

## 2018-01-30 DIAGNOSIS — R41.89 COGNITIVE IMPAIRMENT: ICD-10-CM

## 2018-01-30 NOTE — PROGRESS NOTES
1. Have you been to the ER, urgent care clinic since your last visit? Hospitalized since your last visit? No    2. Have you seen or consulted any other health care providers outside of the [address] [place] [address] since your last visit? Include any pap smears or colon screening. No       Patient here today to have disability forms filled out.

## 2018-01-30 NOTE — MR AVS SNAPSHOT
[address]ru Rod[address] [facility] [address]. [address] [address] [phone] Patient: Jf Fontana MRN: [id number] Visit Information Date & Time Provider Department Dept. Phone Encounter #  
 1/30/2018  1:30 PM Lionel Ortega, [address] [facility] [phone] [id number] Follow-up Instructions Return in about 1 month (around 2/28/2018). Your Appointments 2/6/2018 12:30 PM  
Follow Up with Lionel Ortega MD  
[facility] ([address]) Appt Note: 3 month fu appt [facility] [place]. [address] 500 [facility]  
  
   
 [address] [place] [address] Upcoming Health Maintenance Date Due Pneumococcal 65+ High/Highest Risk (1 of 2 - PCV13) 12/22/1994 FOOT EXAM Q1 12/8/2016 EYE EXAM RETINAL OR DILATED Q1 3/28/2017 Influenza Age 5 to Adult 8/1/2017 MICROALBUMIN Q1 12/16/2017 GLAUCOMA SCREENING Q2Y 3/28/2018 LIPID PANEL Q1 3/4/2018 MEDICARE YEARLY EXAM 3/21/2018 HEMOGLOBIN A1C Q6M 5/21/2018 DTaP/Tdap/Td series (2 - Td) 6/17/2026 Allergies as of 1/30/2018  Review Complete On: 1/30/2018 By: Gordon Thompson Severity Noted Reaction Type Reactions Codeine  11/10/2015    Other (comments) Felt like a different person, per patient Iodine  11/10/2015    Itching Pcn [Penicillins]  11/10/2015    Itching Sulfa (Sulfonamide Antibiotics)  11/10/2015    Unknown (comments) Vicodin [Hydrocodone-acetaminophen]  11/10/2015    Nausea Only Current Immunizations  Reviewed on 6/17/2016 No immunizations on file. Not reviewed this visit You Were Diagnosed With   
  
 Codes Comments Senility    -  Primary ICD-10-CM: R41.81 
ICD-9-CM: 904 Cognitive impairment     ICD-10-CM: R41.89 ICD-9-CM: 294.9 Age-related physical debility     ICD-10-CM: R54 
ICD-9-CM: 654 Vitals BP Pulse Temp Resp Height(growth percentile) Weight(growth percentile) 147/79 (BP 1 Location: Left arm, BP Patient Position: Sitting) 60 97.7 °F (36.5 °C) (Oral) 16 5' 3\" (1.6 m) 123 lb (55.8 kg) SpO2 BMI OB Status Smoking Status 97% 21.79 kg/m2 Hysterectomy Never Smoker BMI and BSA Data Body Mass Index Body Surface Area 21.79 kg/m 2 1.57 m 2 Preferred Pharmacy Pharmacy Name Phone [facility] #[id number]- Ira[address] Grier [address] [phone] Your Updated Medication List  
  
   
This list is accurate as of: 1/30/18  2:58 PM.  Always use your most recent med list.  
  
  
  
  
 alcohol swabs Padm Commonly known as:  BD Single Use Swabs Regular Use to clean finger to test blood sugar 1 time daily. amLODIPine 5 mg tablet Commonly known as:  Zahida Melvin Take 5 mg by mouth daily. aspirin delayed-release 81 mg tablet Take  by mouth daily. atorvastatin 20 mg tablet Commonly known as:  LIPITOR Take 1 Tab by mouth daily. benazepril 40 mg tablet Commonly known as:  LOTENSIN Take 1 Tab by mouth daily. CENTRUM SILVER WOMEN PO Take 1 Tab by mouth daily. [address] Generic drug:  Blood-Glucose Meter  
by Does Not Apply route. FISH OIL 1,000 mg Cap Generic drug:  omega-3 fatty acids-vitamin e One capsule daily. glucose blood VI test strips strip Commonly known as:  TRUE METRIX GLUCOSE TEST STRIP Test blood sugar 1 time daily  
  
 melatonin 10 mg Cap Take 10 mg by mouth nightly as needed. SITagliptin 100 mg tablet Commonly known as:  Cristela Cook Take 1 Tab by mouth daily. We Performed the Following REFERRAL TO PHYSICAL THERAPY [UWP88 Custom] Comments: In Motion Balance and fall prevention, functional capacity exam, and impairment ratings Follow-up Instructions Return in about 1 month (around 2/28/2018). Referral Information Referral ID Referred By Referred To [id number] Madhu Galoier Not Available Visits Status Start Date End Date 1 New Request 1/30/18 1/30/19 If your referral has a status of pending review or denied, additional information will be sent to support the outcome of this decision. Please provide this summary of care documentation to your next provider. Your primary care clinician is listed as Bradford Bauman. If you have any questions after today's visit, please call [phone].

## 2018-01-30 NOTE — PROGRESS NOTES
[facility]    CC: Senility    HPI:     - Patient with a steady history of mental and physical decline as she has gotten older  - States her functional decline due to her age prevents her from being able to work  - Admits to regularly forgetting things she was told  - Patient reports weakness, recurrent falls, and difficulty walking  - Of note, clinical staff have noticed multiple times when the patient does not remember conversations that took place or instructions previously given.       ROS: Positive items marked in RED  CON: fever, chills  Cardiovascular: palpitations, CP  Resp: cough, SOB  GI: nausea, vomiting, diarrhea, abdominal pain  : dysuria, hematuria      Past Medical History:   Diagnosis Date    Diverticulosis     DM type 2 (diabetes mellitus, type 2) ([facility] [address] 2012    Goiter 1994    resolved    Hypercalcemia     nl PTH    Hyperlipidemia 2015    Hypertension 2010    Immunization refused     pt doesn't want any shots       Past Surgical History:   Procedure Laterality Date    HX CATARACT REMOVAL Bilateral 2000    Dr. Tash Fernández  2014    HX HEMORRHOIDECTOMY  1985    [facility]    Patient states Total hysterectomy       Family History   Problem Relation Age of Onset    Cancer Father     Breast Cancer Sister        Social History     Social History    Marital status: UNKNOWN     Spouse name: N/A    Number of children: N/A    Years of education: N/A     Occupational History    retired nurse      Social History Main Topics    Smoking status: Never Smoker    Smokeless tobacco: Never Used    Alcohol use No    Drug use: No    Sexual activity: No      Comment:      Other Topics Concern    None     Social History Narrative       Allergies   Allergen Reactions    Codeine Other (comments)     Felt like a different person, per patient    Iodine Itching    Pcn [Penicillins] Itching    Sulfa (Sulfonamide Antibiotics) Unknown (comments)    Vicodin [Hydrocodone-Acetaminophen] Nausea Only         Current Outpatient Prescriptions:     amLODIPine (NORVASC) 5 mg tablet, Take 5 mg by mouth daily. , Disp: , Rfl:     SITagliptin (JANUVIA) 100 mg tablet, Take 1 Tab by mouth daily. , Disp: 30 Tab, Rfl: 3    atorvastatin (LIPITOR) 20 mg tablet, Take 1 Tab by mouth daily. , Disp: 30 Tab, Rfl: 5    benazepril (LOTENSIN) 40 mg tablet, Take 1 Tab by mouth daily. , Disp: 90 Tab, Rfl: 1    melatonin 10 mg cap, Take 10 mg by mouth nightly as needed. , Disp: 30 Cap, Rfl: 6    MULTIVIT-MIN/IRON/FOLIC/LUTEIN (CENTRUM SILVER WOMEN PO), Take 1 Tab by mouth daily. , Disp: , Rfl:     glucose blood VI test strips (TRUE METRIX GLUCOSE TEST STRIP) strip, Test blood sugar 1 time daily, Disp: 100 Strip, Rfl: 3    alcohol swabs (BD SINGLE USE SWABS REGULAR) padm, Use to clean finger to test blood sugar 1 time daily. , Disp: 100 Pad, Rfl: 3    aspirin delayed-release 81 mg tablet, Take  by mouth daily. , Disp: , Rfl:     Blood-Glucose Meter (EMBRACE BLOOD GLUCOSE SYSTEM) misc, by Does Not Apply route., Disp: , Rfl:     omega-3 fatty acids-vitamin e (FISH OIL) 1,000 mg cap, One capsule daily. , Disp: 60 Cap, Rfl: 5    Physical Exam:      /79 (BP 1 Location: Left arm, BP Patient Position: Sitting)  Pulse 60  Temp 97.7 °F (36.5 °C) (Oral)   Resp 16  Ht 5' 3\" (1.6 m)  Wt 123 lb (55.8 kg)  SpO2 97%  BMI 21.79 kg/m2    General:  WD, WN, NAD  Eyes: sclera clear bilaterally, no discharge noted, eyelids normal in appearance  HENT: NCAT  Lungs: CTAB, Normal respiratory effort and rate  CV: RRR, no MRGs  ABD: soft, non-tender, non-distended, normal bowel sounds  Skin: normal temperature, turgor, color, and texture  Psych: alert and oriented to person, place and time, normal affect  Neuro: Speech normal, moving all extremities    MOCA Score of 24    Assessment/Plan     Senility with cognitive impairment and debility:  - Patient on her MOCA exam was abnormal, suggesting cognitive impairment  - MOCA was sent to be scanned into her chart  - Will refer to [facility] for balance and fall prevention, functional capacity exam, and impairment rating  - Patient to follow up next month      Donaldo Del Rio MD  1/30/2018, 2:47 PM

## 2018-02-08 ENCOUNTER — PATIENT OUTREACH (OUTPATIENT)
Dept: FAMILY MEDICINE CLINIC | Age: 83
End: 2018-02-08

## 2018-02-08 ENCOUNTER — OFFICE VISIT (OUTPATIENT)
Dept: FAMILY MEDICINE CLINIC | Age: 83
End: 2018-02-08

## 2018-02-08 VITALS
RESPIRATION RATE: 16 BRPM | DIASTOLIC BLOOD PRESSURE: 80 MMHG | TEMPERATURE: 97.3 F | BODY MASS INDEX: 21.79 KG/M2 | WEIGHT: 123 LBS | SYSTOLIC BLOOD PRESSURE: 109 MMHG | HEART RATE: 91 BPM | HEIGHT: 63 IN

## 2018-02-08 DIAGNOSIS — R54 SENILE DEBILITY: ICD-10-CM

## 2018-02-08 DIAGNOSIS — E11.9 CONTROLLED TYPE 2 DIABETES MELLITUS WITHOUT COMPLICATION, WITHOUT LONG-TERM CURRENT USE OF INSULIN (HCC): Primary | ICD-10-CM

## 2018-02-08 DIAGNOSIS — I10 ESSENTIAL HYPERTENSION: ICD-10-CM

## 2018-02-08 LAB — HBA1C MFR BLD HPLC: 6.6 %

## 2018-02-08 NOTE — MR AVS SNAPSHOT
[address] [address] 
[phone] Patient: Alcides Dia MRN: [id number] Visit Information Date & Time Provider Department Dept. Phone Encounter #  
 2/8/2018 11:00 AM Grady Baldwin, [address] [id number] Follow-up Instructions Return in about 3 months (around 5/8/2018). Your Appointments 3/21/2018 12:30 PM  
Office Visit with Grady Baldwin MD  
[facility] 23 ([address] [facility]) Appt Note: 295 Replaced by [facility] [address] [facility]  
  
   
 [address] Upcoming Health Maintenance Date Due Pneumococcal 65+ High/Highest Risk (1 of 2 - PCV13) 12/22/1994 FOOT EXAM Q1 12/8/2016 EYE EXAM RETINAL OR DILATED Q1 3/28/2017 Influenza Age 5 to Adult 8/1/2017 MICROALBUMIN Q1 12/16/2017 LIPID PANEL Q1 3/4/2018 GLAUCOMA SCREENING Q2Y 3/28/2018 MEDICARE YEARLY EXAM 3/21/2018 HEMOGLOBIN A1C Q6M 5/21/2018 DTaP/Tdap/Td series (2 - Td) 6/17/2026 Allergies as of 2/8/2018  Review Complete On: 2/8/2018 By: Catarina King LPN Severity Noted Reaction Type Reactions Codeine  11/10/2015    Other (comments) Felt like a different person, per patient Iodine  11/10/2015    Itching Pcn [Penicillins]  11/10/2015    Itching Sulfa (Sulfonamide Antibiotics)  11/10/2015    Unknown (comments) Vicodin [Hydrocodone-acetaminophen]  11/10/2015    Nausea Only Current Immunizations  Reviewed on 6/17/2016 No immunizations on file. Not reviewed this visit You Were Diagnosed With   
  
 Codes Comments Controlled type 2 diabetes mellitus without complication, without long-term current use of insulin ([facility] [address]    -  Primary ICD-10-CM: E11.9 ICD-9-CM: 250.00 Essential hypertension     ICD-10-CM: I10 
ICD-9-CM: 401.9 Senile debility     ICD-10-CM: R54 
ICD-9-CM: 434 Vitals BP Pulse Temp Resp Height(growth percentile) Weight(growth percentile) 109/80 91 97.3 °F (36.3 °C) (Oral) 16 5' 3\" (1.6 m) 123 lb (55.8 kg) BMI OB Status Smoking Status 21.79 kg/m2 Hysterectomy Never Smoker Vitals History BMI and BSA Data Body Mass Index Body Surface Area 21.79 kg/m 2 1.57 m 2 Preferred Pharmacy Pharmacy Name Phone [id number] [phone] Your Updated Medication List  
  
   
This list is accurate as of: 2/8/18 11:02 AM.  Always use your most recent med list.  
  
  
  
  
 alcohol swabs Padm Commonly known as:  BD Single Use Swabs Regular Use to clean finger to test blood sugar 1 time daily. amLODIPine 5 mg tablet Commonly known as:  Cookie Boozer Take 5 mg by mouth daily. aspirin delayed-release 81 mg tablet Take  by mouth daily. atorvastatin 20 mg tablet Commonly known as:  LIPITOR Take 1 Tab by mouth daily. benazepril 40 mg tablet Commonly known as:  LOTENSIN Take 1 Tab by mouth daily. CENTRUM SILVER WOMEN PO Take 1 Tab by mouth daily. [address] Generic drug:  Blood-Glucose Meter  
by Does Not Apply route. FISH OIL 1,000 mg Cap Generic drug:  omega-3 fatty acids-vitamin e One capsule daily. glucose blood VI test strips strip Commonly known as:  TRUE METRIX GLUCOSE TEST STRIP Test blood sugar 1 time daily  
  
 melatonin 10 mg Cap Take 10 mg by mouth nightly as needed. SITagliptin 100 mg tablet Commonly known as:  Karmen Abu Take 1 Tab by mouth daily. We Performed the Following AMB POC HEMOGLOBIN A1C [01387 CPT(R)] Follow-up Instructions Return in about 3 months (around 5/8/2018). To-Do List   
 02/12/2018 2:00 PM  
  Appointment with Gorge Guerrero PT at [facility] ([phone]) Please provide this summary of care documentation to your next provider. Your primary care clinician is listed as Shamika Luciano. If you have any questions after today's visit, please call [phone].

## 2018-02-08 NOTE — PROGRESS NOTES
Rudy Armstrong    CC: Follow up of chronic disease    HPI:     HTN:  - Reports that she is taking her BP medication as prescribed  - Denies any side effects or issues with her medication  - No log brought in for review. Reports that her home SBP has been in the 120s     DMII:  - Reports taking her medication as prescribed  - Denies any side effects from her medication  - Checking her blood sugar at home. No log brought in for review. Does not remember what her home blood sugar range was. (Today it was 198)  - Reports interest with speaking with nurse navigator about how she can further improve her blood sugar control  - She reports that she does not understand what her HGBA1c is.   - Is concerned that her blood sugar has been high    Debility  - Unchanged since last appointment  - Has not seen PT yet  - has appointment scheduled with PT      ROS: Positive items marked in RED  CON: fever, chills  Cardiovascular: palpitations, CP  Resp: cough, SOB  GI: nausea, vomiting, diarrhea  : dysuria, hematuria      Past Medical History:   Diagnosis Date    Diverticulosis     DM type 2 (diabetes mellitus, type 2) ([facility] [address] 2012    Goiter 1994    resolved    Hypercalcemia     nl PTH    Hyperlipidemia 2015    Hypertension 2010    Immunization refused     pt doesn't want any shots       Past Surgical History:   Procedure Laterality Date    HX CATARACT REMOVAL Bilateral 2000    Dr. Saad Sanders  2014     [address]    Patient states Total hysterectomy       Family History   Problem Relation Age of Onset    Cancer Father     Breast Cancer Sister        Social History     Social History    Marital status: UNKNOWN     Spouse name: N/A    Number of children: N/A    Years of education: N/A     Occupational History    retired nurse      Social History Main Topics    Smoking status: Never Smoker    Smokeless tobacco: Never Used    Alcohol use No    Drug use: No    Sexual activity: No      Comment:      Other Topics Concern    None     Social History Narrative       Allergies   Allergen Reactions    Codeine Other (comments)     Felt like a different person, per patient    Iodine Itching    Pcn [Penicillins] Itching    Sulfa (Sulfonamide Antibiotics) Unknown (comments)    Vicodin [Hydrocodone-Acetaminophen] Nausea Only         Current Outpatient Prescriptions:     amLODIPine (NORVASC) 5 mg tablet, Take 5 mg by mouth daily. , Disp: , Rfl:     SITagliptin (JANUVIA) 100 mg tablet, Take 1 Tab by mouth daily. , Disp: 30 Tab, Rfl: 3    atorvastatin (LIPITOR) 20 mg tablet, Take 1 Tab by mouth daily. , Disp: 30 Tab, Rfl: 5    benazepril (LOTENSIN) 40 mg tablet, Take 1 Tab by mouth daily. , Disp: 90 Tab, Rfl: 1    melatonin 10 mg cap, Take 10 mg by mouth nightly as needed. , Disp: 30 Cap, Rfl: 6    MULTIVIT-MIN/IRON/FOLIC/LUTEIN (CENTRUM SILVER WOMEN PO), Take 1 Tab by mouth daily. , Disp: , Rfl:     glucose blood VI test strips (TRUE METRIX GLUCOSE TEST STRIP) strip, Test blood sugar 1 time daily, Disp: 100 Strip, Rfl: 3    alcohol swabs (BD SINGLE USE SWABS REGULAR) padm, Use to clean finger to test blood sugar 1 time daily. , Disp: 100 Pad, Rfl: 3    aspirin delayed-release 81 mg tablet, Take  by mouth daily. , Disp: , Rfl:     Blood-Glucose Meter (EMBRACE BLOOD GLUCOSE SYSTEM) misc, by Does Not Apply route., Disp: , Rfl:     omega-3 fatty acids-vitamin e (FISH OIL) 1,000 mg cap, One capsule daily. , Disp: 60 Cap, Rfl: 5    Physical Exam:      /80  Pulse 91  Temp 97.3 °F (36.3 °C) (Oral)   Resp 16  Ht 5' 3\" (1.6 m)  Wt 123 lb (55.8 kg)  BMI 21.79 kg/m2    General:  WD, WN, NAD  Eyes: sclera clear bilaterally, no discharge noted, eyelids normal in appearance  HENT: NCAT  Lungs: CTAB, Normal respiratory effort and rate  CV: RRR, no MRGs  ABD: soft, non-tender, non-distended, normal bowel sounds  Skin: normal temperature, turgor, color, and texture  Psych: alert and oriented to person, place and time, normal affect  Neuro: Speech normal, moving all extremities    Results for [facility] (MRN [id number]):   Ref.  Range 2/8/2018 11:09   Hemoglobin A1c (POC) Latest Units: % 6.6       Assessment/Plan     DMII, Well Controlled:  - Will continue her current medication regimen  - Educated on HGBA1c and what her goal is as a diabetic  - Reminded to bring her blood sugar log to all her appointments  - Will have her speak with the nurse navigator today      HTN, Improving:  - Close to goal BP of <130/80  - Will continue her current BP regimen  - Reminded to bring her BP log to all her appointments  - Follow up in 3 months      Senile Debility, Unchanged:  - Encouraged to go to her PT appointment for evaluation and treatment  - Follow up in 3 months        Emmy Marie MD  2/8/2018, 10:39 AM

## 2018-02-08 NOTE — PROGRESS NOTES
1. Have you been to the ER, urgent care clinic since your last visit? Hospitalized since your last visit? No    2. Have you seen or consulted any other health care providers outside of the [address] [place] [address] since your last visit? Include any pap smears or colon screening.  No

## 2018-02-08 NOTE — PROGRESS NOTES
Health Promotion and Risk Prevention     Referred by Kayli Aly, patient has questions about DM. Met with patient during her appointment, she camejo snot understand what A1C is. Explained and discussed goal, gave her a handout explained A1c and what the average BG is for each A1c value and reviewed handout. Offered to meet with patient, we decided I would see her at her fu appointment with Kayli Aly, she can also call me if she has further questions.

## 2018-02-12 ENCOUNTER — HOSPITAL ENCOUNTER (OUTPATIENT)
Dept: PHYSICAL THERAPY | Age: 83
Discharge: HOME | End: 2018-02-12
Payer: MEDICARE

## 2018-02-12 PROCEDURE — G8979 MOBILITY GOAL STATUS: HCPCS

## 2018-02-12 PROCEDURE — 97162 PT EVAL MOD COMPLEX 30 MIN: CPT

## 2018-02-12 PROCEDURE — G8978 MOBILITY CURRENT STATUS: HCPCS
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
## 2018-02-21 ENCOUNTER — APPOINTMENT (OUTPATIENT)
Dept: PHYSICAL THERAPY | Age: 83
End: 2018-02-21
Payer: MEDICARE

## 2018-02-21 DIAGNOSIS — E11.9 CONTROLLED TYPE 2 DIABETES MELLITUS WITHOUT COMPLICATION, WITHOUT LONG-TERM CURRENT USE OF INSULIN (HCC): ICD-10-CM

## 2018-02-28 ENCOUNTER — APPOINTMENT (OUTPATIENT)
Dept: PHYSICAL THERAPY | Age: 83
End: 2018-02-28
Payer: MEDICARE

## 2018-03-01 ENCOUNTER — APPOINTMENT (OUTPATIENT)
Dept: PHYSICAL THERAPY | Age: 83
End: 2018-03-01
Payer: MEDICARE

## 2018-03-05 ENCOUNTER — HOSPITAL ENCOUNTER (OUTPATIENT)
Dept: PHYSICAL THERAPY | Age: 83
Discharge: HOME | End: 2018-03-05
Payer: MEDICARE

## 2018-03-05 PROCEDURE — 97112 NEUROMUSCULAR REEDUCATION: CPT

## 2018-03-05 RX ORDER — CALCIUM CITRATE/VITAMIN D3 200MG-6.25
TABLET ORAL
Qty: 100 STRIP | Refills: 3 | Status: SHIPPED | OUTPATIENT
Start: 2018-03-05 | End: 2018-04-06 | Stop reason: SDUPTHER

## 2018-03-05 NOTE — PROGRESS NOTES
PHYSICAL THERAPY - DAILY TREATMENT NOTE    Patient Name: Yandy Ribeiro        Date: 3/5/2018  : 1929   YES Patient  Verified  Visit #:   2   of    Insurance: Payor: Morgan Lebron / Plan: [address] [place] [address] HMO / Product Type: Managed Care Medicare /      In time: 9:01 am Out time: 9:30 am   Total Treatment Time: 29     Medicare Time Tracking (below)   Total Timed Codes (min):  29 1:1 Treatment Time: 29     TREATMENT AREA =  Generalized muscle weakness [M62.81]    SUBJECTIVE  Pain Level (on 0 to 10 scale): 0  / 10   Medication Changes/New allergies or changes in medical history, any new surgeries or procedures? NO    If yes, update Summary List   Subjective Functional Status/Changes:  []  No changes reported     Pt reports moderate dizziness secondary to taking a sleeping pill. \"no pain just sleepy. \"       OBJECTIVE  Modalities Rationale:  Na      29 min Neuromuscular Re-ed: Static and dynamic balance, VSE sitting   Rationale:    improve balance and increase proprioception to improve the patients ability to decrease fall risk       min Patient Education:  YES  Reviewed HEP   []  Progressed/Changed HEP based on: Other Objective/Functional Measures:    Add MSR BUN EO, MSR instep EC, VSE sitting, dynamic gait side stepping, retro ambulation     Post Treatment Pain Level (on 0 to 10) scale:   0 / 10     ASSESSMENT  Assessment/Changes in Function:   Pt reporting no sx increase with exercise. Pt instructed in VSE exercise with written copy. Pt verbalized good understanding of HEP with written copy. []  See Progress Note/Recertification   Patient will continue to benefit from skilled PT services to address imbalance/dizziness and instruct in home and community integration to attain remaining goals. Progress toward goals / Updated goals:    First visit from initial evaluation.  Progressed treatment per plan of care     PLAN  []  Upgrade activities as tolerated YES Continue plan of care   []  Discharge due to :    []  Other:      Therapist: Zeenat Cota PTA    Date: 3/5/2018 Time: 930 AM     Future Appointments  Date Time Provider Adelia Rey   3/21/2018 12:30 PM Jenny Ness MD [address] [facility] 0[address]

## 2018-03-12 ENCOUNTER — APPOINTMENT (OUTPATIENT)
Dept: PHYSICAL THERAPY | Age: 83
End: 2018-03-12
Payer: MEDICARE

## 2018-03-16 ENCOUNTER — APPOINTMENT (OUTPATIENT)
Dept: GENERAL RADIOLOGY | Age: 83
End: 2018-03-16
Attending: EMERGENCY MEDICINE
Payer: MEDICARE

## 2018-03-16 ENCOUNTER — APPOINTMENT (OUTPATIENT)
Dept: CT IMAGING | Age: 83
End: 2018-03-16
Attending: PHYSICIAN ASSISTANT
Payer: MEDICARE

## 2018-03-16 ENCOUNTER — HOSPITAL ENCOUNTER (EMERGENCY)
Age: 83
Discharge: HOME | End: 2018-03-16
Attending: EMERGENCY MEDICINE
Payer: MEDICARE

## 2018-03-16 VITALS
TEMPERATURE: 98.3 F | OXYGEN SATURATION: 100 % | SYSTOLIC BLOOD PRESSURE: 126 MMHG | HEIGHT: 62 IN | WEIGHT: 124 LBS | DIASTOLIC BLOOD PRESSURE: 85 MMHG | HEART RATE: 100 BPM | BODY MASS INDEX: 22.82 KG/M2 | RESPIRATION RATE: 21 BRPM

## 2018-03-16 DIAGNOSIS — R42 DIZZINESS: Primary | ICD-10-CM

## 2018-03-16 LAB
ALBUMIN SERPL-MCNC: 4.2 G/DL (ref 3.4–5)
ALBUMIN/GLOB SERPL: 1.1 {RATIO} (ref 0.8–1.7)
ALP SERPL-CCNC: 135 U/L (ref 45–117)
ALT SERPL-CCNC: 30 U/L (ref 13–56)
ANION GAP SERPL CALC-SCNC: 11 MMOL/L (ref 3–18)
APPEARANCE UR: CLEAR
AST SERPL-CCNC: 19 U/L (ref 15–37)
ATRIAL RATE: 95 BPM
BASOPHILS # BLD: 0 K/UL (ref 0–0.06)
BASOPHILS NFR BLD: 0 % (ref 0–2)
BILIRUB SERPL-MCNC: 0.3 MG/DL (ref 0.2–1)
BILIRUB UR QL: NEGATIVE
BUN SERPL-MCNC: 31 MG/DL (ref 7–18)
BUN/CREAT SERPL: 29 (ref 12–20)
CALCIUM SERPL-MCNC: 10.2 MG/DL (ref 8.5–10.1)
CALCULATED P AXIS, ECG09: 34 DEGREES
CALCULATED R AXIS, ECG10: -33 DEGREES
CALCULATED T AXIS, ECG11: 60 DEGREES
CHLORIDE SERPL-SCNC: 102 MMOL/L (ref 100–108)
CK MB CFR SERPL CALC: 1.1 % (ref 0–4)
CK MB CFR SERPL CALC: 1.3 % (ref 0–4)
CK MB SERPL-MCNC: 1.2 NG/ML (ref 5–25)
CK MB SERPL-MCNC: 1.3 NG/ML (ref 5–25)
CK SERPL-CCNC: 108 U/L (ref 26–192)
CK SERPL-CCNC: 99 U/L (ref 26–192)
CO2 SERPL-SCNC: 26 MMOL/L (ref 21–32)
COLOR UR: YELLOW
CREAT SERPL-MCNC: 1.08 MG/DL (ref 0.6–1.3)
DIAGNOSIS, 93000: NORMAL
DIFFERENTIAL METHOD BLD: NORMAL
EOSINOPHIL # BLD: 0.1 K/UL (ref 0–0.4)
EOSINOPHIL NFR BLD: 1 % (ref 0–5)
ERYTHROCYTE [DISTWIDTH] IN BLOOD BY AUTOMATED COUNT: 13.8 % (ref 11.6–14.5)
GLOBULIN SER CALC-MCNC: 3.7 G/DL (ref 2–4)
GLUCOSE BLD STRIP.AUTO-MCNC: 167 MG/DL (ref 70–110)
GLUCOSE SERPL-MCNC: 176 MG/DL (ref 74–99)
GLUCOSE UR STRIP.AUTO-MCNC: >1000 MG/DL
HCT VFR BLD AUTO: 39.2 % (ref 35–45)
HGB BLD-MCNC: 13.1 G/DL (ref 12–16)
HGB UR QL STRIP: NEGATIVE
KETONES UR QL STRIP.AUTO: NEGATIVE MG/DL
LEUKOCYTE ESTERASE UR QL STRIP.AUTO: NEGATIVE
LYMPHOCYTES # BLD: 2.8 K/UL (ref 0.9–3.6)
LYMPHOCYTES NFR BLD: 30 % (ref 21–52)
MCH RBC QN AUTO: 29 PG (ref 24–34)
MCHC RBC AUTO-ENTMCNC: 33.4 G/DL (ref 31–37)
MCV RBC AUTO: 86.9 FL (ref 74–97)
MONOCYTES # BLD: 0.9 K/UL (ref 0.05–1.2)
MONOCYTES NFR BLD: 9 % (ref 3–10)
NEUTS SEG # BLD: 5.8 K/UL (ref 1.8–8)
NEUTS SEG NFR BLD: 60 % (ref 40–73)
NITRITE UR QL STRIP.AUTO: NEGATIVE
P-R INTERVAL, ECG05: 166 MS
PH UR STRIP: 6.5 [PH] (ref 5–8)
PLATELET # BLD AUTO: 392 K/UL (ref 135–420)
PMV BLD AUTO: 9.5 FL (ref 9.2–11.8)
POTASSIUM SERPL-SCNC: 4.4 MMOL/L (ref 3.5–5.5)
PROT SERPL-MCNC: 7.9 G/DL (ref 6.4–8.2)
PROT UR STRIP-MCNC: NEGATIVE MG/DL
Q-T INTERVAL, ECG07: 344 MS
QRS DURATION, ECG06: 82 MS
QTC CALCULATION (BEZET), ECG08: 432 MS
RBC # BLD AUTO: 4.51 M/UL (ref 4.2–5.3)
SODIUM SERPL-SCNC: 139 MMOL/L (ref 136–145)
SP GR UR REFRACTOMETRY: 1.02 (ref 1–1.03)
TROPONIN I SERPL-MCNC: <0.02 NG/ML (ref 0–0.04)
TROPONIN I SERPL-MCNC: <0.02 NG/ML (ref 0–0.04)
UROBILINOGEN UR QL STRIP.AUTO: 0.2 EU/DL (ref 0.2–1)
VENTRICULAR RATE, ECG03: 95 BPM
WBC # BLD AUTO: 9.5 K/UL (ref 4.6–13.2)

## 2018-03-16 PROCEDURE — 70450 CT HEAD/BRAIN W/O DYE: CPT

## 2018-03-16 PROCEDURE — 96360 HYDRATION IV INFUSION INIT: CPT

## 2018-03-16 PROCEDURE — 82962 GLUCOSE BLOOD TEST: CPT

## 2018-03-16 PROCEDURE — 71045 X-RAY EXAM CHEST 1 VIEW: CPT

## 2018-03-16 PROCEDURE — 85025 COMPLETE CBC W/AUTO DIFF WBC: CPT | Performed by: PHYSICIAN ASSISTANT

## 2018-03-16 PROCEDURE — 82550 ASSAY OF CK (CPK): CPT | Performed by: PHYSICIAN ASSISTANT

## 2018-03-16 PROCEDURE — 74011250636 HC RX REV CODE- 250/636: Performed by: PHYSICIAN ASSISTANT

## 2018-03-16 PROCEDURE — 81003 URINALYSIS AUTO W/O SCOPE: CPT | Performed by: PHYSICIAN ASSISTANT

## 2018-03-16 PROCEDURE — 96361 HYDRATE IV INFUSION ADD-ON: CPT

## 2018-03-16 PROCEDURE — 93005 ELECTROCARDIOGRAM TRACING: CPT

## 2018-03-16 PROCEDURE — 80053 COMPREHEN METABOLIC PANEL: CPT | Performed by: PHYSICIAN ASSISTANT

## 2018-03-16 PROCEDURE — 99284 EMERGENCY DEPT VISIT MOD MDM: CPT

## 2018-03-16 RX ADMIN — SODIUM CHLORIDE 500 ML: 900 INJECTION, SOLUTION INTRAVENOUS at 19:52

## 2018-03-16 NOTE — ED PROVIDER NOTES
EMERGENCY DEPARTMENT HISTORY AND PHYSICAL EXAM    6:52 PM      Date: 3/16/2018  Patient Name: Aidan Jackson    History of Presenting Illness     Chief Complaint   Patient presents with    Dizziness         History Provided By: Patient    Chief Complaint: dizziness   Duration: 1 Weeks  Timing:  Acute  Location:   Quality: lightheaded and dizzy   Severity:   Modifying Factors:   Associated Symptoms: denies any other associated signs or symptoms      Additional History (Context): Aidan Jackson is a 80 y.o. female with diabetes, hypertension and hyperlipidemia who presents with dizziness x 1 week. She says she was seen at [facility] 1 week ago for these symptoms. She had normal work up and was recommended to follow up with her PCP. No other symptoms today, except that she is always hungry, and she is stressed. She says she cannot reach her PCP. Luisana pearson says patient saw her PCP last week and was also seen twice at [facility] over the past 2 weeks for nonspecific complaints. Denies abdominal pain, nausea, vomiting, diarrhea, constipation. denies nausea, vomiting,confusion,  headache, numbness, weakness, vision changes. S She says she has not been sleeping well. PCP: Alejandro Reid MD    Current Outpatient Prescriptions   Medication Sig Dispense Refill    TRUE METRIX GLUCOSE TEST STRIP strip TEST BLOOD SUGAR ONE TIME DAILY 100 Strip 3    SITagliptin (JANUVIA) 100 mg tablet Take 1 Tab by mouth daily. 30 Tab 3    amLODIPine (NORVASC) 5 mg tablet Take 5 mg by mouth daily.  atorvastatin (LIPITOR) 20 mg tablet Take 1 Tab by mouth daily. 30 Tab 5    benazepril (LOTENSIN) 40 mg tablet Take 1 Tab by mouth daily. 90 Tab 1    melatonin 10 mg cap Take 10 mg by mouth nightly as needed. 30 Cap 6    MULTIVIT-MIN/IRON/FOLIC/LUTEIN (CENTRUM SILVER WOMEN PO) Take 1 Tab by mouth daily.  alcohol swabs (BD SINGLE USE SWABS REGULAR) padm Use to clean finger to test blood sugar 1 time daily.  100 Pad 3  aspirin delayed-release 81 mg tablet Take  by mouth daily.  omega-3 fatty acids-vitamin e (FISH OIL) 1,000 mg cap One capsule daily. 61 Cap 5       Past History     Past Medical History:  Past Medical History:   Diagnosis Date    Diverticulosis     DM type 2 (diabetes mellitus, type 2) ([facility] [address] 2012    Goiter 1994    resolved    Hypercalcemia     nl PTH    Hyperlipidemia 2015    Hypertension 2010    Immunization refused     pt doesn't want any shots       Past Surgical History:  Past Surgical History:   Procedure Laterality Date    HX CATARACT REMOVAL Bilateral 2000    Dr. Myron Monaco  2014   [address] [facility]    Patient states Total hysterectomy       Family History:  Family History   Problem Relation Age of Onset    Cancer Father     Breast Cancer Sister        Social History:  Social History   Substance Use Topics    Smoking status: Never Smoker    Smokeless tobacco: Never Used    Alcohol use No       Allergies: Allergies   Allergen Reactions    Codeine Other (comments)     Felt like a different person, per patient    Iodine Itching    Pcn [Penicillins] Itching    Sulfa (Sulfonamide Antibiotics) Unknown (comments)    Vicodin [Hydrocodone-Acetaminophen] Nausea Only         Review of Systems       Review of Systems   Constitutional: Positive for appetite change. Negative for fever. HENT: Negative for facial swelling. Eyes: Negative for visual disturbance. Respiratory: Negative for shortness of breath. Cardiovascular: Negative for chest pain. Gastrointestinal: Negative for abdominal pain. Genitourinary: Negative for dysuria. Musculoskeletal: Negative for neck pain. Skin: Negative for rash. Neurological: Positive for dizziness and light-headedness. Negative for syncope, weakness, numbness and headaches. Psychiatric/Behavioral: Positive for sleep disturbance. Negative for confusion.    All other systems reviewed and are negative. Physical Exam     Visit Vitals    /85    Pulse 100    Temp 98.3 °F (36.8 °C)    Resp 21    Ht 5' 2\" (1.575 m)    Wt 56.2 kg (124 lb)    SpO2 100%    BMI 22.68 kg/m2         Physical Exam   Constitutional: She is oriented to person, place, and time. She appears well-developed and well-nourished. No distress. HENT:   Head: Normocephalic and atraumatic. Eyes: Conjunctivae are normal.   Neck: Normal range of motion. Cardiovascular: Normal rate and regular rhythm. Pulmonary/Chest: Effort normal.   Abdominal: She exhibits no distension. Musculoskeletal: Normal range of motion. Neurological: She is alert and oriented to person, place, and time. She has normal strength. No cranial nerve deficit or sensory deficit. She displays a negative Romberg sign. Coordination and gait normal.   No sensory or motor deficits. Strength and sensation equal to bilateral upper and lower extremities. Skin: Skin is warm and dry. She is not diaphoretic. Psychiatric: She has a normal mood and affect. Nursing note and vitals reviewed. Diagnostic Study Results     Labs -  Recent Results (from the past 12 hour(s))   CBC WITH AUTOMATED DIFF    Collection Time: 03/16/18  5:10 PM   Result Value Ref Range    WBC 9.5 4.6 - 13.2 K/uL    RBC 4.51 4.20 - 5.30 M/uL    HGB 13.1 12.0 - 16.0 g/dL    HCT 39.2 35.0 - 45.0 %    MCV 86.9 74.0 - 97.0 FL    MCH 29.0 24.0 - 34.0 PG    MCHC 33.4 31.0 - 37.0 g/dL    RDW 13.8 11.6 - 14.5 %    PLATELET 763 962 - 981 K/uL    MPV 9.5 9.2 - 11.8 FL    NEUTROPHILS 60 40 - 73 %    LYMPHOCYTES 30 21 - 52 %    MONOCYTES 9 3 - 10 %    EOSINOPHILS 1 0 - 5 %    BASOPHILS 0 0 - 2 %    ABS. NEUTROPHILS 5.8 1.8 - 8.0 K/UL    ABS. LYMPHOCYTES 2.8 0.9 - 3.6 K/UL    ABS. MONOCYTES 0.9 0.05 - 1.2 K/UL    ABS. EOSINOPHILS 0.1 0.0 - 0.4 K/UL    ABS.  BASOPHILS 0.0 0.0 - 0.06 K/UL    DF AUTOMATED     METABOLIC PANEL, COMPREHENSIVE    Collection Time: 03/16/18 5:10 PM   Result Value Ref Range    Sodium 139 136 - 145 mmol/L    Potassium 4.4 3.5 - 5.5 mmol/L    Chloride 102 100 - 108 mmol/L    CO2 26 21 - 32 mmol/L    Anion gap 11 3.0 - 18 mmol/L    Glucose 176 (H) 74 - 99 mg/dL    BUN 31 (H) 7.0 - 18 MG/DL    Creatinine 1.08 0.6 - 1.3 MG/DL    BUN/Creatinine ratio 29 (H) 12 - 20      GFR est AA 58 (L) >60 ml/min/1.73m2    GFR est non-AA 48 (L) >60 ml/min/1.73m2    Calcium 10.2 (H) 8.5 - 10.1 MG/DL    Bilirubin, total 0.3 0.2 - 1.0 MG/DL    ALT (SGPT) 30 13 - 56 U/L    AST (SGOT) 19 15 - 37 U/L    Alk.  phosphatase 135 (H) 45 - 117 U/L    Protein, total 7.9 6.4 - 8.2 g/dL    Albumin 4.2 3.4 - 5.0 g/dL    Globulin 3.7 2.0 - 4.0 g/dL    A-G Ratio 1.1 0.8 - 1.7     CARDIAC PANEL,(CK, CKMB & TROPONIN)    Collection Time: 03/16/18  5:10 PM   Result Value Ref Range    CK 99 26 - 192 U/L    CK - MB 1.3 <3.6 ng/ml    CK-MB Index 1.3 0.0 - 4.0 %    Troponin-I, Qt. <0.02 0.0 - 0.045 NG/ML   EKG, 12 LEAD, INITIAL    Collection Time: 03/16/18  5:11 PM   Result Value Ref Range    Ventricular Rate 95 BPM    Atrial Rate 95 BPM    P-R Interval 166 ms    QRS Duration 82 ms    Q-T Interval 344 ms    QTC Calculation (Bezet) 432 ms    Calculated P Axis 34 degrees    Calculated R Axis -33 degrees    Calculated T Axis 60 degrees    Diagnosis       Normal sinus rhythm  Left axis deviation  Poor R Wave Progression , ASMI, age  indeterminate , vs lead location  No previous ECGs available  Confirmed by April Finn ([id number]) on 3/16/2018 7:37:59 PM     GLUCOSE, POC    Collection Time: 03/16/18  5:24 PM   Result Value Ref Range    Glucose (POC) 167 (H) 70 - 110 mg/dL   URINALYSIS W/ RFLX MICROSCOPIC    Collection Time: 03/16/18  5:52 PM   Result Value Ref Range    Color YELLOW      Appearance CLEAR      Specific gravity 1.016 1.005 - 1.030      pH (UA) 6.5 5.0 - 8.0      Protein NEGATIVE  NEG mg/dL    Glucose >1000 (A) NEG mg/dL    Ketone NEGATIVE  NEG mg/dL    Bilirubin NEGATIVE  NEG      Blood NEGATIVE  NEG      Urobilinogen 0.2 0.2 - 1.0 EU/dL    Nitrites NEGATIVE  NEG      Leukocyte Esterase NEGATIVE  NEG     CARDIAC PANEL,(CK, CKMB & TROPONIN)    Collection Time: 03/16/18  8:26 PM   Result Value Ref Range     26 - 192 U/L    CK - MB 1.2 <3.6 ng/ml    CK-MB Index 1.1 0.0 - 4.0 %    Troponin-I, Qt. <0.02 0.0 - 0.045 NG/ML       Radiologic Studies -   XR CHEST PORT    (Results Pending)   CT HEAD WO CONT    (Results Pending)         Medical Decision Making   I am the first provider for this patient. I reviewed the vital signs, available nursing notes, past medical history, past surgical history, family history and social history. Vital Signs-Reviewed the patient's vital signs. Pulse Oximetry Analysis -  99 on room air (Interpretation)    Cardiac Monitor:  Rate: 107   Rhythm:  Sinus Tachycardia     EKG: Interpreted by the EP. Time Interpreted: 1715   Rate: 95   Rhythm: Normal Sinus Rhythm    Interpretation: Left axis deviation, LVH , NSR    Comparison: none     Records Reviewed: Nursing Notes and Old Medical Records (Time of Review: 6:52 PM)    ED Course: Progress Notes, Reevaluation, and Consults:    HR down to 98. Stable with ambulation, no signs of dizziness. 2 sets cardiac panels normal.  Vital signs normal.  Labs normal.  Stable for discahrge, has f/u with opcp Monday. Discussed treatment plan, return precautions, symptomatic relief, and expected time to improvement. All questions answered. Patient is stable for discharge and outpatient management. Provider Notes (Medical Decision Making): MDM  Number of Diagnoses or Management Options  Dizziness:   Diagnosis management comments: 81yo F w/ h/o hyperlipidemia, HTN, DM2 c/o dizziness x 1 week. She has been worked up at [facility] twice in the past 2 weeks for different complaints, and was instructed to f/u with her PCP. CT head pending. Vitals are stable. She is slightly tachy at 107.          Amount and/or Complexity of Data Reviewed  Clinical lab tests: ordered and reviewed  Tests in the radiology section of CPT®: ordered and reviewed  Tests in the medicine section of CPT®: reviewed and ordered          Diagnosis     Clinical Impression:   1. Dizziness        Disposition:     Follow-up Information     Follow up With Details Comments Contact Info    Samson Miller MD Schedule an appointment as soon as possible for a visit  [phone]  [phone]      [facility] EMERGENCY DEPT  Immediately if symptoms worsen [address]  [phone]           Patient's Medications   Start Taking    No medications on file   Continue Taking    ALCOHOL SWABS (BD SINGLE USE SWABS REGULAR) PADM    Use to clean finger to test blood sugar 1 time daily. AMLODIPINE (NORVASC) 5 MG TABLET    Take 5 mg by mouth daily. ASPIRIN DELAYED-RELEASE 81 MG TABLET    Take  by mouth daily. ATORVASTATIN (LIPITOR) 20 MG TABLET    Take 1 Tab by mouth daily. BENAZEPRIL (LOTENSIN) 40 MG TABLET    Take 1 Tab by mouth daily. MELATONIN 10 MG CAP    Take 10 mg by mouth nightly as needed. MULTIVIT-MIN/IRON/FOLIC/LUTEIN (CENTRUM SILVER WOMEN PO)    Take 1 Tab by mouth daily. OMEGA-3 FATTY ACIDS-VITAMIN E (FISH OIL) 1,000 MG CAP    One capsule daily. SITAGLIPTIN (JANUVIA) 100 MG TABLET    Take 1 Tab by mouth daily.     TRUE METRIX GLUCOSE TEST STRIP STRIP    TEST BLOOD SUGAR ONE TIME DAILY   These Medications have changed    No medications on file   Stop Taking    No medications on file     _______________________________    Attestations:  6:52 PM  3/16/2018  Chapincito Watts PA-C  _______________________________

## 2018-03-16 NOTE — TELEPHONE ENCOUNTER
[place] [facility] [place] supplies that are covered by patients insurance.

## 2018-03-17 NOTE — DISCHARGE INSTRUCTIONS
Dizziness: Care Instructions  Your Care Instructions  Dizziness is the feeling of unsteadiness or fuzziness in your head. It is different than having vertigo, which is a feeling that the room is spinning or that you are moving or falling. It is also different from lightheadedness, which is the feeling that you are about to faint. It can be hard to know what causes dizziness. Some people feel dizzy when they have migraine headaches. Sometimes bouts of flu can make you feel dizzy. Some medical conditions, such as heart problems or high blood pressure, can make you feel dizzy. Many medicines can cause dizziness, including medicines for high blood pressure, pain, or anxiety. If a medicine causes your symptoms, your doctor may recommend that you stop or change the medicine. If it is a problem with your heart, you may need medicine to help your heart work better. If there is no clear reason for your symptoms, your doctor may suggest watching and waiting for a while to see if the dizziness goes away on its own. Follow-up care is a key part of your treatment and safety. Be sure to make and go to all appointments, and call your doctor if you are having problems. It's also a good idea to know your test results and keep a list of the medicines you take. How can you care for yourself at home? · If your doctor recommends or prescribes medicine, take it exactly as directed. Call your doctor if you think you are having a problem with your medicine. · Do not drive while you feel dizzy. · Try to prevent falls. Steps you can take include:  ¨ Using nonskid mats, adding grab bars near the tub, and using night-lights. ¨ Clearing your home so that walkways are free of anything you might trip on. ¨ Letting family and friends know that you have been feeling dizzy. This will help them know how to help you. When should you call for help? Call 911 anytime you think you may need emergency care.  For example, call if:  ? · You passed out (lost consciousness). ? · You have dizziness along with symptoms of a heart attack. These may include:  ¨ Chest pain or pressure, or a strange feeling in the chest.  ¨ Sweating. ¨ Shortness of breath. ¨ Nausea or vomiting. ¨ Pain, pressure, or a strange feeling in the back, neck, jaw, or upper belly or in one or both shoulders or arms. ¨ Lightheadedness or sudden weakness. ¨ A fast or irregular heartbeat. ? · You have symptoms of a stroke. These may include:  ¨ Sudden numbness, tingling, weakness, or loss of movement in your face, arm, or leg, especially on only one side of your body. ¨ Sudden vision changes. ¨ Sudden trouble speaking. ¨ Sudden confusion or trouble understanding simple statements. ¨ Sudden problems with walking or balance. ¨ A sudden, severe headache that is different from past headaches. ?Call your doctor now or seek immediate medical care if:  ? · You feel dizzy and have a fever, headache, or ringing in your ears. ? · You have new or increased nausea and vomiting. ? · Your dizziness does not go away or comes back. ? Watch closely for changes in your health, and be sure to contact your doctor if:  ? · You do not get better as expected. Where can you learn more? Go to http://cori-rojas.info/. Enter W564 in the search box to learn more about \"Dizziness: Care Instructions. \"  Current as of: March 20, 2017  Content Version: 11.4  © 8428-4856 [employer]. Care instructions adapted under license by [employer] (which disclaims liability or warranty for this information). If you have questions about a medical condition or this instruction, always ask your healthcare professional. [place] [address] any warranty or liability for your use of this information.

## 2018-03-17 NOTE — ED NOTES
I have reviewed discharge instructions with the patient. The patient verbalized understanding.  Pt agreeable to dc plan, pt in nad wheeled to ED lobby to daughters room per request.

## 2018-03-18 NOTE — PROGRESS NOTES
CT head shows NPH. This was noted at time of visit. This is not a new finding, has been diagnosed on CT last year and patient has followed up with PCP as directed. No acute intervention.       Chapincito Watts PA-C

## 2018-03-19 ENCOUNTER — PATIENT OUTREACH (OUTPATIENT)
Dept: FAMILY MEDICINE CLINIC | Age: 83
End: 2018-03-19

## 2018-03-19 ENCOUNTER — OFFICE VISIT (OUTPATIENT)
Dept: FAMILY MEDICINE CLINIC | Age: 83
End: 2018-03-19

## 2018-03-19 VITALS
HEART RATE: 88 BPM | DIASTOLIC BLOOD PRESSURE: 71 MMHG | BODY MASS INDEX: 22.26 KG/M2 | RESPIRATION RATE: 19 BRPM | SYSTOLIC BLOOD PRESSURE: 126 MMHG | OXYGEN SATURATION: 98 % | TEMPERATURE: 97.7 F | HEIGHT: 62 IN | WEIGHT: 121 LBS

## 2018-03-19 DIAGNOSIS — F51.05 INSOMNIA SECONDARY TO DEPRESSION WITH ANXIETY: Primary | ICD-10-CM

## 2018-03-19 DIAGNOSIS — F41.8 INSOMNIA SECONDARY TO DEPRESSION WITH ANXIETY: Primary | ICD-10-CM

## 2018-03-19 RX ORDER — FAMOTIDINE 20 MG/1
20 TABLET, FILM COATED ORAL DAILY
COMMUNITY
End: 2018-04-11 | Stop reason: SDUPTHER

## 2018-03-19 RX ORDER — ACARBOSE 25 MG/1
TABLET ORAL
COMMUNITY
End: 2018-04-18

## 2018-03-19 RX ORDER — LANCETS 33 GAUGE
EACH MISCELLANEOUS
Qty: 100 LANCET | Refills: 3 | Status: SHIPPED | OUTPATIENT
Start: 2018-03-19 | End: 2018-04-06 | Stop reason: SDUPTHER

## 2018-03-19 RX ORDER — ZOLPIDEM TARTRATE 5 MG/1
TABLET ORAL
COMMUNITY
End: 2018-03-19 | Stop reason: ALTCHOICE

## 2018-03-19 RX ORDER — DOXEPIN HYDROCHLORIDE 25 MG/1
25 CAPSULE ORAL
Qty: 30 CAP | Refills: 3 | Status: SHIPPED | OUTPATIENT
Start: 2018-03-19 | End: 2018-03-22 | Stop reason: ALTCHOICE

## 2018-03-19 RX ORDER — BLOOD-GLUCOSE METER
EACH MISCELLANEOUS
Qty: 1 EACH | Refills: 0 | Status: SHIPPED | OUTPATIENT
Start: 2018-03-19 | End: 2018-04-12 | Stop reason: SDUPTHER

## 2018-03-19 NOTE — MR AVS SNAPSHOT
[address] [address] 
[phone] Patient: Rudy La MRN: [id number] WAE:[id number] Visit Information Date & Time Provider Department Dept. Phone Encounter #  
 3/19/2018  8:00 AM Drake Andrade, [address] [facility] [phone] [id number] Follow-up Instructions Return in about 1 month (around 4/19/2018). Your Appointments 4/18/2018  1:15 PM  
Follow Up with Drake Andrade, MD [address] (Jennifer Phoenix) Appt Note: To include [address] [facility] [address]  
  
   
 [address] Upcoming Health Maintenance Date Due Pneumococcal 65+ High/Highest Risk (1 of 2 - PCV13) 12/22/1994 FOOT EXAM Q1 12/8/2016 EYE EXAM RETINAL OR DILATED Q1 3/28/2017 Influenza Age 5 to Adult 8/1/2017 MICROALBUMIN Q1 12/16/2017 LIPID PANEL Q1 3/4/2018 GLAUCOMA SCREENING Q2Y 3/28/2018 MEDICARE YEARLY EXAM 3/21/2018 HEMOGLOBIN A1C Q6M 8/8/2018 DTaP/Tdap/Td series (2 - Td) 6/17/2026 Allergies as of 3/19/2018  Review Complete On: 3/16/2018 By: Cole Moseley RN Severity Noted Reaction Type Reactions Codeine  11/10/2015    Other (comments) Felt like a different person, per patient Iodine  11/10/2015    Itching Pcn [Penicillins]  11/10/2015    Itching Sulfa (Sulfonamide Antibiotics)  11/10/2015    Unknown (comments) Vicodin [Hydrocodone-acetaminophen]  11/10/2015    Nausea Only Current Immunizations  Reviewed on 6/17/2016 No immunizations on file. Not reviewed this visit You Were Diagnosed With   
  
 Codes Comments Insomnia secondary to depression with anxiety    -  Primary ICD-10-CM: F41.8, F51.05 
ICD-9-CM: 300.4, 327.02 Vitals BP Pulse Temp Resp Height(growth percentile) Weight(growth percentile) 126/71 88 97.7 °F (36.5 °C) (Oral) 19 5' 2\" (1.575 m) 121 lb (54.9 kg) SpO2 BMI OB Status Smoking Status 98% 22.13 kg/m2 Hysterectomy Never Smoker Vitals History BMI and BSA Data Body Mass Index Body Surface Area  
 22.13 kg/m 2 1.55 m 2 Preferred Pharmacy Pharmacy Name Phone [facility]/PHARMACY #[id number]- [address] [phone] Your Updated Medication List  
  
   
This list is accurate as of 3/19/18 11:59 PM.  Always use your most recent med list.  
  
  
  
  
 acarbose 25 mg tablet Commonly known as:  PRECOSE Take  by mouth. alcohol swabs Padm Commonly known as:  BD Single Use Swabs Regular Use to clean finger to test blood sugar 1 time daily. amLODIPine 5 mg tablet Commonly known as:  Rubén Lords Take 5 mg by mouth daily. aspirin delayed-release 81 mg tablet Take  by mouth daily. atorvastatin 20 mg tablet Commonly known as:  LIPITOR Take 1 Tab by mouth daily. benazepril 40 mg tablet Commonly known as:  LOTENSIN Take 1 Tab by mouth daily. Blood Glucose Control, Low Soln Commonly known as:  TRUE METRIX LEVEL 1 Use as directed to check home blood sugar Blood-Glucose Meter Misc Commonly known as:  TRUE METRIX AIR GLUCOSE METER Use as directed to check home blood sugar. CENTRUM SILVER WOMEN PO Take 1 Tab by mouth daily. doxepin 25 mg capsule Commonly known as:  SINEquan Take 1 Cap by mouth nightly. famotidine 20 mg tablet Commonly known as:  PEPCID Take 20 mg by mouth two (2) times a day. FISH OIL 1,000 mg Cap Generic drug:  omega-3 fatty acids-vitamin e One capsule daily. lancets 33 gauge Misc Commonly known as:  Cindy Vickie Use daily to check blood sugar  
  
 melatonin 10 mg Cap Take 10 mg by mouth nightly as needed. SITagliptin 100 mg tablet Commonly known as:  Charyl Shields Take 1 Tab by mouth daily. TRUE METRIX GLUCOSE TEST STRIP strip Generic drug:  glucose blood VI test strips TEST BLOOD SUGAR ONE TIME DAILY Prescriptions Sent to Pharmacy Refills  
 doxepin (SINEQUAN) 25 mg capsule 3 Sig: Take 1 Cap by mouth nightly. Class: Normal  
 Pharmacy: [address] Ph #: [phone] Route: Oral  
  
Follow-up Instructions Return in about 1 month (around 4/19/2018). Introducing [facility] & HEALTH SERVICES! Darling Torres introduces [employer] patient portal. Now you can access parts of your medical record, email your doctor's office, and request medication refills online. 1. In your internet browser, go to [email or web address] 2. Click on the First Time User? Click Here link in the Sign In box. You will see the New Member Sign Up page. 3. Enter your Bidstalk Access Code exactly as it appears below. You will not need to use this code after youve completed the sign-up process. If you do not sign up before the expiration date, you must request a new code. · B[employer]stalk Access Code: [email or web address] Expires: 6/11/2018  5:23 AM 
 
4. Enter the last four digits of your Social Security Number (xxxx) and Date of Birth (mm/dd/yyyy) as indicated and click Submit. You will be taken to the next sign-up page. 5. Create a Bidstalk ID. This will be your Bidstalk login ID and cannot be changed, so think of one that is secure and easy to remember. 6. Create a Bidstalk password. You can change your password at any time. 7. Enter your Password Reset Question and Answer. This can be used at a later time if you forget your password. 8. Enter your e-mail address. You will receive e-mail notification when new information is available in [address] 9. Click Sign Up. You can now view and download portions of your medical record. 10. Click the Download Summary menu link to download a portable copy of your medical information. If you have questions, please visit the Frequently Asked Questions section of the [employer] website. Remember, [employer] is NOT to be used for urgent needs. For medical emergencies, dial 911. Now available from your iPhone and Android! Please provide this summary of care documentation to your next provider. Your primary care clinician is listed as Pipo Arthur. If you have any questions after today's visit, please call [phone].

## 2018-03-19 NOTE — PROGRESS NOTES
Rudy Armstrong    CC: ED visit    HPI:     Went to ER due high HR and elevated BP. She was evaluated and they thought it was due to anxiety 3/9/18  Went again to ER due to elevated BP 3/14/18  Patient reports that she didn't get evaluated in office, because she thought that there was no availability  Endorses being stressed out and depressed (2/2 health issues, loss of independence, and court/legal issues) and having issues sleeping  States she has not been sleeping for days.  States that she just needs to sleep  Currently endorses daytime sleeyness, decreased appetite (eating small amount), and episodes of dizziness    ROS: Positive items marked in RED  CON: fever, chills, fatigue  Cardiovascular: palpitations, CP  Resp: cough, SOB  GI: nausea, vomiting, diarrhea  : dysuria, hematuria      Past Medical History:   Diagnosis Date    Diverticulosis     DM type 2 (diabetes mellitus, type 2) ([facility] 2012    Goiter 1994    resolved    Hypercalcemia     nl PTH    Hyperlipidemia 2015    Hypertension 2010    Immunization refused     pt doesn't want any shots       Past Surgical History:   Procedure Laterality Date    HX CATARACT REMOVAL Bilateral 2000    Dr. Saad Sanders  2014     [address]    Patient states Total hysterectomy       Family History   Problem Relation Age of Onset    Cancer Father     Breast Cancer Sister        Social History     Social History    Marital status: UNKNOWN     Spouse name: N/A    Number of children: N/A    Years of education: N/A     Occupational History    retired nurse      Social History Main Topics    Smoking status: Never Smoker    Smokeless tobacco: Never Used    Alcohol use No    Drug use: No    Sexual activity: No      Comment:      Other Topics Concern    None     Social History Narrative       Allergies   Allergen Reactions    Codeine Other (comments)     Felt like a different person, per patient    Iodine Itching    Pcn [Penicillins] Itching    Sulfa (Sulfonamide Antibiotics) Unknown (comments)    Vicodin [Hydrocodone-Acetaminophen] Nausea Only         Current Outpatient Prescriptions:     acarbose (PRECOSE) 25 mg tablet, Take  by mouth., Disp: , Rfl:     zolpidem (AMBIEN) 5 mg tablet, Take  by mouth nightly as needed for Sleep., Disp: , Rfl:     famotidine (PEPCID) 20 mg tablet, Take 20 mg by mouth two (2) times a day., Disp: , Rfl:     TRUE METRIX GLUCOSE TEST STRIP strip, TEST BLOOD SUGAR ONE TIME DAILY, Disp: 100 Strip, Rfl: 3    SITagliptin (JANUVIA) 100 mg tablet, Take 1 Tab by mouth daily. , Disp: 30 Tab, Rfl: 3    amLODIPine (NORVASC) 5 mg tablet, Take 5 mg by mouth daily. , Disp: , Rfl:     atorvastatin (LIPITOR) 20 mg tablet, Take 1 Tab by mouth daily. , Disp: 30 Tab, Rfl: 5    benazepril (LOTENSIN) 40 mg tablet, Take 1 Tab by mouth daily. , Disp: 90 Tab, Rfl: 1    melatonin 10 mg cap, Take 10 mg by mouth nightly as needed. , Disp: 30 Cap, Rfl: 6    MULTIVIT-MIN/IRON/FOLIC/LUTEIN (CENTRUM SILVER WOMEN PO), Take 1 Tab by mouth daily. , Disp: , Rfl:     alcohol swabs (BD SINGLE USE SWABS REGULAR) padm, Use to clean finger to test blood sugar 1 time daily. , Disp: 100 Pad, Rfl: 3    aspirin delayed-release 81 mg tablet, Take  by mouth daily. , Disp: , Rfl:     omega-3 fatty acids-vitamin e (FISH OIL) 1,000 mg cap, One capsule daily. , Disp: 60 Cap, Rfl: 5    Physical Exam:      /71  Pulse 88  Temp 97.7 °F (36.5 °C) (Oral)   Resp 19  Ht 5' 2\" (1.575 m)  Wt 121 lb (54.9 kg)  SpO2 98%  BMI 22.13 kg/m2    General:  WD, WN, NAD  Eyes: sclera clear bilaterally, no discharge noted, eyelids normal in appearance  HENT: NCAT  Lungs: CTAB, Normal respiratory effort and rate  CV: RRR, no MRGs  ABD: soft, non-tender, non-distended, normal bowel sounds  Skin: normal temperature, turgor, color, and texture  Psych: alert and oriented to person, place and time, normal affect  Neuro: Speech normal, moving all extremities    Assessment/Plan     Insomnia/Depression/Anxiety:  - Patient advised to make same day visit, when she wants to be evaluated over a concerns  - Her lack of sleep appears to be affecting her mood significantly and making her very anxious  - Will do trial on do Doxepin, given her notable anxiety and difficulty sleeping  - Follow up in one month        Samson Miller MD  3/19/2018, 8:27 AM

## 2018-03-19 NOTE — PROGRESS NOTES
Chief Complaint   Patient presents with   [facility] Follow Up     Medication questions       1. Have you been to the ER, urgent care clinic since your last visit? Hospitalized since your last visit? Luisana Hanson 2 occasions. No admissions. 2. Have you seen or consulted any other health care providers outside of the [address] [place] [address] since your last visit? Include any pap smears or colon screening.   No

## 2018-03-19 NOTE — PROGRESS NOTES
Transitional Care: ED Visit    Patient listed on discharge (Daily Census) report on 3/19/2018 . Patient discharged from [facility] [place] [facility]"" Emergency Department on  3/16/18 , diagnosed with  Dizziness. Seen buy PCP , roxie Hudson. Discussed patient's ED FU with . Patient has Anxiety and admitted to having anxiety and Depression, seen by her previous PCP and was given Ambien to help her sleep.  discontinued the Ambien and started her on Doxepin to help her control her anxiety/depression and sleep. Called patient and left her a voice mail. Returned her call, line busy.

## 2018-03-20 ENCOUNTER — PATIENT OUTREACH (OUTPATIENT)
Dept: FAMILY MEDICINE CLINIC | Age: 83
End: 2018-03-20

## 2018-03-20 NOTE — PROGRESS NOTES
Transitional Care Follow up         Called patient,spoke with her and her daughter Jam Saini. Discussed medications at  length, stopped taking Januvia and currently taking Jardiance sample given to her from her previous PCP, . Plan to purchase Doxepin today and has stopped Ambien. Fasting BG this am 191. Notified Viola Ochoa per staff messaging of patient  stopped taking Januvia and currently taking Jardiance sample given to her from her previous PCP, . Goals      Establish PCP relationships and regularly scheduled appointments. 3/20/2018 Nurse Navigator discussed with patient and her daughter Jam Saini importance of having one PCP for safety reason,and continuation of medical plan. Patient decided she stay with Viola Ochoa. Patient will call when she feels she needs to be seen for sick visit and not walk into the office asking to speak with the physician. Patient is to discusS current medications with PCP, Viola Ochoa and bring all medication bottles to her appointments.

## 2018-03-21 ENCOUNTER — TELEPHONE (OUTPATIENT)
Dept: FAMILY MEDICINE CLINIC | Age: 83
End: 2018-03-21

## 2018-03-21 NOTE — TELEPHONE ENCOUNTER
Patient provided another number [phone] . .... Ja Campos calling as you requested.    Please contact her at either number [phone] or [phone]

## 2018-03-21 NOTE — TELEPHONE ENCOUNTER
Returned patient's call. She is calling to let us know, she slept 8 hours after taking Doxepin, but her Dizziness is worst today. Sounds really good on the phone, more alert than usual    Fasting , /84 and HR 89 this morning. Scheduled her to see Carey Flowers 3/22/18 @ 2pm, she will not take her Doxepin tonight.

## 2018-03-22 ENCOUNTER — OFFICE VISIT (OUTPATIENT)
Dept: FAMILY MEDICINE CLINIC | Age: 83
End: 2018-03-22

## 2018-03-22 VITALS
OXYGEN SATURATION: 98 % | HEART RATE: 96 BPM | TEMPERATURE: 97.7 F | BODY MASS INDEX: 22.6 KG/M2 | DIASTOLIC BLOOD PRESSURE: 64 MMHG | SYSTOLIC BLOOD PRESSURE: 131 MMHG | RESPIRATION RATE: 17 BRPM | WEIGHT: 122.8 LBS | HEIGHT: 62 IN

## 2018-03-22 DIAGNOSIS — E11.9 CONTROLLED TYPE 2 DIABETES MELLITUS WITHOUT COMPLICATION, WITHOUT LONG-TERM CURRENT USE OF INSULIN (HCC): ICD-10-CM

## 2018-03-22 DIAGNOSIS — G47.00 INSOMNIA, UNSPECIFIED TYPE: Primary | ICD-10-CM

## 2018-03-22 RX ORDER — TRAZODONE HYDROCHLORIDE 50 MG/1
50 TABLET ORAL
Qty: 30 TAB | Refills: 1 | Status: SHIPPED | OUTPATIENT
Start: 2018-03-22 | End: 2018-04-18

## 2018-03-22 NOTE — PROGRESS NOTES
1. Have you been to the ER, urgent care clinic since your last visit? Hospitalized since your last visit? Yes When: Patient was seen at [place] [address] on 3-    2. Have you seen or consulted any other health care providers outside of the [address] [place] [address] since your last visit? Include any pap smears or colon screening. No      Patient here today for a medication follow-up.

## 2018-03-22 NOTE — MR AVS SNAPSHOT
[address] [address] 
[phone] Patient: Fitz Sorensen MRN: [id number]:[id number] Visit Information Date & Time Provider Department Dept. Phone Encounter #  
 3/22/2018  2:00 PM Alberto Flynn, [address] [facility] [phone] [id number] Follow-up Instructions Return if symptoms worsen or fail to improve. Your Appointments 4/18/2018  1:15 PM  
Follow Up with MD Bianca Davis 23 ([address]) Appt Note: To include [address]  
  
   
 [address] [address] [place] [address] [address] Upcoming Health Maintenance Date Due Pneumococcal 65+ High/Highest Risk (1 of 2 - PCV13) 12/22/1994 FOOT EXAM Q1 12/8/2016 EYE EXAM RETINAL OR DILATED Q1 3/28/2017 Influenza Age 5 to Adult 8/1/2017 MICROALBUMIN Q1 12/16/2017 LIPID PANEL Q1 3/4/2018 MEDICARE YEARLY EXAM 3/21/2018 GLAUCOMA SCREENING Q2Y 3/28/2018 HEMOGLOBIN A1C Q6M 8/8/2018 DTaP/Tdap/Td series (2 - Td) 6/17/2026 Allergies as of 3/22/2018  Review Complete On: 3/22/2018 By: Karin Delatorre Severity Noted Reaction Type Reactions Codeine  11/10/2015    Other (comments) Felt like a different person, per patient Iodine  11/10/2015    Itching Pcn [Penicillins]  11/10/2015    Itching Sulfa (Sulfonamide Antibiotics)  11/10/2015    Unknown (comments) Vicodin [Hydrocodone-acetaminophen]  11/10/2015    Nausea Only Current Immunizations  Reviewed on 6/17/2016 No immunizations on file. Not reviewed this visit You Were Diagnosed With   
  
 Codes Comments Insomnia, unspecified type    -  Primary ICD-10-CM: G47.00 ICD-9-CM: 780.52 Vitals BP Pulse Temp Resp Height(growth percentile) Weight(growth percentile) 131/64 (BP 1 Location: Left arm, BP Patient Position: Sitting) 96 97.7 °F (36.5 °C) (Oral) 17 5' 2\" (1.575 m) 122 lb 12.8 oz (55.7 kg) SpO2 BMI OB Status Smoking Status 98% 22.46 kg/m2 Hysterectomy Never Smoker Vitals History BMI and BSA Data Body Mass Index Body Surface Area  
 22.46 kg/m 2 1.56 m 2 Preferred Pharmacy Pharmacy Name Phone [facility]/PHARMACY #9355- [address] [phone] Your Updated Medication List  
  
   
This list is accurate as of 3/22/18  2:47 PM.  Always use your most recent med list.  
  
  
  
  
 acarbose 25 mg tablet Commonly known as:  PRECOSE Take  by mouth. alcohol swabs Padm Commonly known as:  BD Single Use Swabs Regular Use to clean finger to test blood sugar 1 time daily. amLODIPine 5 mg tablet Commonly known as:  Bosie Shield Take 5 mg by mouth daily. aspirin delayed-release 81 mg tablet Take  by mouth daily. atorvastatin 20 mg tablet Commonly known as:  LIPITOR Take 1 Tab by mouth daily. benazepril 40 mg tablet Commonly known as:  LOTENSIN Take 1 Tab by mouth daily. Blood Glucose Control, Low Soln Commonly known as:  TRUE METRIX LEVEL 1 Use as directed to check home blood sugar Blood-Glucose Meter Misc Commonly known as:  TRUE METRIX AIR GLUCOSE METER Use as directed to check home blood sugar. CENTRUM SILVER WOMEN PO Take 1 Tab by mouth daily. famotidine 20 mg tablet Commonly known as:  PEPCID Take 20 mg by mouth daily. FISH OIL 1,000 mg Cap Generic drug:  omega-3 fatty acids-vitamin e One capsule daily. JARDIANCE 10 mg tablet Generic drug:  empagliflozin Take  by mouth daily. lancets 33 gauge Misc Commonly known as:  Kristan Coral Use daily to check blood sugar  
  
 melatonin 10 mg Cap Take 10 mg by mouth nightly as needed. SITagliptin 100 mg tablet Commonly known as:  Heather Sheriff Take 1 Tab by mouth daily. traZODone 50 mg tablet Commonly known as:  Juan Ramon Mediate Take 1 Tab by mouth nightly. TRUE METRIX GLUCOSE TEST STRIP strip Generic drug:  glucose blood VI test strips TEST BLOOD SUGAR ONE TIME DAILY Prescriptions Sent to Pharmacy Refills  
 traZODone (DESYREL) 50 mg tablet 1 Sig: Take 1 Tab by mouth nightly. Class: Normal  
 Pharmacy: [facility] [address] Ph #: [phone] Route: Oral  
  
Follow-up Instructions Return if symptoms worsen or fail to improve. Introducing [facility] & HEALTH SERVICES! Aliza Patel introduces [employer] patient portal. Now you can access parts of your medical record, email your doctor's office, and request medication refills online. 1. In your internet browser, go to [email or web address] 2. Click on the First Time User? Click Here link in the Sign In box. You will see the New Member Sign Up page. 3. Enter your [employer] Access Code exactly as it appears below. You will not need to use this code after youve completed the sign-up process. If you do not sign up before the expiration date, you must request a new code. · [employer] Access Code: [email or web address] Expires: 6/11/2018  5:23 AM 
 
4. Enter the last four digits of your Social Security Number (xxxx) and Date of Birth (mm/dd/yyyy) as indicated and click Submit. You will be taken to the next sign-up page. 5. Create a [employer] ID. This will be your [employer] login ID and cannot be changed, so think of one that is secure and easy to remember. 6. Create a [employer] password. You can change your password at any time. 7. Enter your Password Reset Question and Answer. This can be used at a later time if you forget your password. 8. Enter your e-mail address. You will receive e-mail notification when new information is available in [address] 9. Click Sign Up.  You can now view and download portions of your medical record. 10. Click the Download Summary menu link to download a portable copy of your medical information. If you have questions, please visit the Frequently Asked Questions section of the [email or web address] website. Remember, [email or web address] is NOT to be used for urgent needs. For medical emergencies, dial 911. Now available from your iPhone and Android! Please provide this summary of care documentation to your next provider. Your primary care clinician is listed as [facility]. If you have any questions after today's visit, please call [phone].

## 2018-03-22 NOTE — PROGRESS NOTES
[facility]    CC: F/U for insomnia and anxiety    HPI:     Patient consents to medical student observing visit. Insomnia/Anxiety:  -Tried Doxepin once   - It did help her have a full night of sleep. (Nurse Navigator reports improvement in patient the subsequent day.  Patient seemed to be the most mental sharp and clear that she has ever heard the patient be)  - Patient states that the next morning she awoke with dry mouth, crusty eyes, and soaked in sweat   -The sxs scared her and she is afraid of taking the medication again  - Still having social issues causing anxiety and stress    ROS: Positive items marked in RED  CON: fever, fatigue  Eyes: crusting  ENT: xerostomia  Cardiovascular: palpitations, CP  Resp: SOB  SKIN: rash, itching, diaphoresis  Neuro: numbness, tingling, weakness  Psych: anxiety      Past Medical History:   Diagnosis Date    Diverticulosis     DM type 2 (diabetes mellitus, type 2) ([facility] [address] 2012    Goiter 1994    resolved    Hypercalcemia     nl PTH    Hyperlipidemia 2015    Hypertension 2010    Immunization refused     pt doesn't want any shots       Past Surgical History:   Procedure Laterality Date    HX CATARACT REMOVAL Bilateral 2000    Dr. Isia Coppola  2014    [facility]    Patient states Total hysterectomy       Family History   Problem Relation Age of Onset    Cancer Father     Breast Cancer Sister        Social History     Social History    Marital status: UNKNOWN     Spouse name: N/A    Number of children: N/A    Years of education: N/A     Occupational History    retired nurse      Social History Main Topics    Smoking status: Never Smoker    Smokeless tobacco: Never Used    Alcohol use No    Drug use: No    Sexual activity: No      Comment:      Other Topics Concern    None     Social History Narrative       Allergies   Allergen Reactions    Codeine Other (comments) Felt like a different person, per patient    Iodine Itching    Pcn [Penicillins] Itching    Sulfa (Sulfonamide Antibiotics) Unknown (comments)    Vicodin [Hydrocodone-Acetaminophen] Nausea Only         Current Outpatient Prescriptions:     empagliflozin (JARDIANCE) 10 mg tablet, Take  by mouth daily. , Disp: , Rfl:     Blood-Glucose Meter (TRUE METRIX AIR GLUCOSE METER) [facility], Use as directed to check home blood sugar., Disp: 1 Each, Rfl: 0    Blood Glucose Control, Low (TRUE METRIX LEVEL 1) soln, Use as directed to check home blood sugar, Disp: 1 Each, Rfl: 12    lancets (TRUEPLUS LANCETS) 33 gauge misc, Use daily to check blood sugar, Disp: 100 Lancet, Rfl: 3    acarbose (PRECOSE) 25 mg tablet, Take  by mouth., Disp: , Rfl:     famotidine (PEPCID) 20 mg tablet, Take 20 mg by mouth daily. , Disp: , Rfl:     TRUE METRIX GLUCOSE TEST STRIP strip, TEST BLOOD SUGAR ONE TIME DAILY, Disp: 100 Strip, Rfl: 3    amLODIPine (NORVASC) 5 mg tablet, Take 5 mg by mouth daily. , Disp: , Rfl:     MULTIVIT-MIN/IRON/FOLIC/LUTEIN (CENTRUM SILVER WOMEN PO), Take 1 Tab by mouth daily. , Disp: , Rfl:     alcohol swabs (BD SINGLE USE SWABS REGULAR) padm, Use to clean finger to test blood sugar 1 time daily. , Disp: 100 Pad, Rfl: 3    aspirin delayed-release 81 mg tablet, Take  by mouth daily. , Disp: , Rfl:     omega-3 fatty acids-vitamin e (FISH OIL) 1,000 mg cap, One capsule daily. , Disp: 60 Cap, Rfl: 5    doxepin (SINEQUAN) 25 mg capsule, Take 1 Cap by mouth nightly., Disp: 30 Cap, Rfl: 3    SITagliptin (JANUVIA) 100 mg tablet, Take 1 Tab by mouth daily. , Disp: 30 Tab, Rfl: 3    atorvastatin (LIPITOR) 20 mg tablet, Take 1 Tab by mouth daily. , Disp: 30 Tab, Rfl: 5    benazepril (LOTENSIN) 40 mg tablet, Take 1 Tab by mouth daily. , Disp: 90 Tab, Rfl: 1    melatonin 10 mg cap, Take 10 mg by mouth nightly as needed. , Disp: 30 Cap, Rfl: 6    Physical Exam:      /64 (BP 1 Location: Left arm, BP Patient Position: Sitting) Pulse 96  Temp 97.7 °F (36.5 °C) (Oral)   Resp 17  Ht 5' 2\" (1.575 m)  Wt 122 lb 12.8 oz (55.7 kg)  SpO2 98%  BMI 22.46 kg/m2    General:  WD, WN, NAD  Eyes: sclera clear bilaterally, no discharge noted, eyelids normal in appearance  HENT: NCAT  Lungs: CTAB, Normal respiratory effort and rate  CV: RRR, no MRGs  ABD: soft, non-tender, non-distended, normal bowel sounds  Skin: normal temperature, turgor, color, and texture  Psych: alert and oriented to person, place and time, normal affect  Neuro: Speech normal, moving all extremities      Assessment/Plan     Insomnia:  - Pt having several sxs consistent with adverse reaction of Doxepin  - Will switch to Trazodone  - Follow up as needed if symptoms worsen or fails to improve. Original note transcribed by Lisa Lott MS3. Note reviewed and edited as needed.     Riley Cheadle, MD  3/22/2018, 2:14 PM

## 2018-03-26 ENCOUNTER — OFFICE VISIT (OUTPATIENT)
Dept: FAMILY MEDICINE CLINIC | Age: 83
End: 2018-03-26

## 2018-03-26 VITALS
WEIGHT: 122 LBS | BODY MASS INDEX: 22.45 KG/M2 | DIASTOLIC BLOOD PRESSURE: 79 MMHG | SYSTOLIC BLOOD PRESSURE: 126 MMHG | HEART RATE: 96 BPM | RESPIRATION RATE: 17 BRPM | HEIGHT: 62 IN | TEMPERATURE: 97 F

## 2018-03-26 DIAGNOSIS — G47.00 INSOMNIA, UNSPECIFIED TYPE: ICD-10-CM

## 2018-03-26 DIAGNOSIS — R41.89 IMPAIRED COGNITION: Primary | ICD-10-CM

## 2018-03-26 NOTE — MR AVS SNAPSHOT
[address] [id number] 
[phone] Patient: Glenda Wynn MRN: [id number] VMK:[id number] Visit Information Date & Time Provider Department Dept. Phone Encounter #  
 3/26/2018 10:00 AM Timothy Monroy, [facility] [id number] Follow-up Instructions Return in about 1 month (around 4/26/2018). Your Appointments 4/18/2018  1:15 PM  
Follow Up with Timothy Monroy MD  
[facility] 23 ([facility]) Appt Note: To include [address] [facility] [address] [place] [facility] [address]  
  
   
 [address] [place] [address] [address] Upcoming Health Maintenance Date Due Pneumococcal 65+ High/Highest Risk (1 of 2 - PCV13) 12/22/1994 FOOT EXAM Q1 12/8/2016 EYE EXAM RETINAL OR DILATED Q1 3/28/2017 Influenza Age 5 to Adult 8/1/2017 MICROALBUMIN Q1 12/16/2017 LIPID PANEL Q1 3/4/2018 MEDICARE YEARLY EXAM 3/21/2018 GLAUCOMA SCREENING Q2Y 3/28/2018 HEMOGLOBIN A1C Q6M 8/8/2018 DTaP/Tdap/Td series (2 - Td) 6/17/2026 Allergies as of 3/26/2018  Review Complete On: 3/26/2018 By: Debbie Dorantes LPN Severity Noted Reaction Type Reactions Codeine  11/10/2015    Other (comments) Felt like a different person, per patient Iodine  11/10/2015    Itching Pcn [Penicillins]  11/10/2015    Itching Sulfa (Sulfonamide Antibiotics)  11/10/2015    Unknown (comments) Vicodin [Hydrocodone-acetaminophen]  11/10/2015    Nausea Only Current Immunizations  Reviewed on 3/26/2018 No immunizations on file. Reviewed by Debbie Dorantes LPN on 8/00/7275 at 79:45 AM  
You Were Diagnosed With   
  
 Codes Comments Impaired cognition    -  Primary ICD-10-CM: R41.89 ICD-9-CM: 294.9 Insomnia, unspecified type     ICD-10-CM: G47.00 ICD-9-CM: 780.52 Vitals BP Pulse Temp Resp Height(growth percentile) Weight(growth percentile) 126/79 96 97 °F (36.1 °C) (Oral) 17 5' 2\" (1.575 m) 122 lb (55.3 kg) BMI OB Status Smoking Status 22.31 kg/m2 Hysterectomy Never Smoker Vitals History BMI and BSA Data Body Mass Index Body Surface Area  
 22.31 kg/m 2 1.56 m 2 Preferred Pharmacy Pharmacy Name Phone [id number]- [address]ona 7 [phone] Your Updated Medication List  
  
   
This list is accurate as of 3/26/18 10:27 AM.  Always use your most recent med list.  
  
  
  
  
 acarbose 25 mg tablet Commonly known as:  PRECOSE Take  by mouth. alcohol swabs Padm Commonly known as:  BD Single Use Swabs Regular Use to clean finger to test blood sugar 1 time daily. amLODIPine 5 mg tablet Commonly known as:  Yas Evangelist Take 5 mg by mouth daily. aspirin delayed-release 81 mg tablet Take  by mouth daily. atorvastatin 20 mg tablet Commonly known as:  LIPITOR Take 1 Tab by mouth daily. benazepril 40 mg tablet Commonly known as:  LOTENSIN Take 1 Tab by mouth daily. Blood Glucose Control, Low Soln Commonly known as:  TRUE METRIX LEVEL 1 Use as directed to check home blood sugar Blood-Glucose Meter Misc Commonly known as:  TRUE METRIX AIR GLUCOSE METER Use as directed to check home blood sugar. CENTRUM SILVER WOMEN PO Take 1 Tab by mouth daily. diphenhydrAMINE 50 mg tablet Commonly known as:  BENADRYL Take 1 Tab by mouth nightly as needed for Sleep.  
  
 famotidine 20 mg tablet Commonly known as:  PEPCID Take 20 mg by mouth daily. FISH OIL 1,000 mg Cap Generic drug:  omega-3 fatty acids-vitamin e One capsule daily. JARDIANCE 10 mg tablet Generic drug:  empagliflozin Take  by mouth daily. lancets 33 gauge Misc Commonly known as:  Valiant Sayres Use daily to check blood sugar SITagliptin 100 mg tablet Commonly known as:  Maura Loop Take 1 Tab by mouth daily. traZODone 50 mg tablet Commonly known as:  Corey Richford Take 1 Tab by mouth nightly. TRUE METRIX GLUCOSE TEST STRIP strip Generic drug:  glucose blood VI test strips TEST BLOOD SUGAR ONE TIME DAILY Prescriptions Sent to Pharmacy Refills diphenhydrAMINE (BENADRYL) 50 mg tablet 1 Sig: Take 1 Tab by mouth nightly as needed for Sleep. Class: Normal  
 Pharmacy: [facility] [address] Ph #: [phone] Route: Oral  
  
We Performed the Following [address] [place] [address] Comments:  
 Home health aide. REFERRAL TO NEUROLOGY [HHS41 Custom] Comments:  
 Suspect patient has dementia. Patient would like further evaluation. Follow-up Instructions Return in about 1 month (around 4/26/2018). Referral Information Referral ID Referred By Referred To  
  
 [id number] Joanna DEWEY Not Available Visits Status Start Date End Date 1 New Request 3/26/18 3/26/19 If your referral has a status of pending review or denied, additional information will be sent to support the outcome of this decision. Referral ID Referred By Referred To  
 [id number] [facility] [address] [facility] [address] [facility] [address] [facility] Visits Status Start Date End Date 1 New Request 3/26/18 3/26/19 If your referral has a status of pending review or denied, additional information will be sent to support the outcome of this decision. Introducing [facility]! Anastasia Fenton introduces [employer] patient portal. Now you can access parts of your medical record, email your doctor's office, and request medication refills online. 1. In your internet browser, go to [email or web address]. [employer] 2. Click on the First Time User? Click Here link in the Sign In box. You will see the New Member Sign Up page. 3. Enter your [employer] Access Code exactly as it appears below. You will not need to use this code after youve completed the sign-up process. If you do not sign up before the expiration date, you must request a new code. · [employer] Access Code: [email or web address] Expires: 6/11/2018  5:23 AM 
 
4. Enter the last four digits of your Social Security Number (xxxx) and Date of Birth (mm/dd/yyyy) as indicated and click Submit. You will be taken to the next sign-up page. 5. Create a [employer] ID. This will be your [employer] login ID and cannot be changed, so think of one that is secure and easy to remember. 6. Create a [employer] password. You can change your password at any time. 7. Enter your Password Reset Question and Answer. This can be used at a later time if you forget your password. 8. Enter your e-mail address. You will receive e-mail notification when new information is available in [address] 9. Click Sign Up. You can now view and download portions of your medical record. 10. Click the Download Summary menu link to download a portable copy of your medical information. If you have questions, please visit the Frequently Asked Questions section of the [employer] website. Remember, [employer] is NOT to be used for urgent needs. For medical emergencies, dial 911. Now available from your iPhone and Android! Please provide this summary of care documentation to your next provider. Your primary care clinician is listed as Monalisa Hansen. If you have any questions after today's visit, please call [phone].

## 2018-03-26 NOTE — PROGRESS NOTES
1. Have you been to the ER, urgent care clinic since your last visit? Hospitalized since your last visit? No    2. Have you seen or consulted any other health care providers outside of the [address] [place] [address] since your last visit? Include any pap smears or colon screening. No       Patient reports she has been having insomnia since Thursday and has had difficulty walking. PHQ2 populated.

## 2018-03-26 NOTE — PROGRESS NOTES
[facility]    CC: F/U of Insomnina    HPI:     Insomina:  - Reports hallucinations on trazodone  - Called after hours and was told to stop trazodone and to try tylenol PM for sleep  - Has not slept. Took tylenol PM with no improvement.   - Continues to endorse memory issues and sleep disturbace  - Went to ED on 3/16/2018 for her chronic dizziness and had a CT done of her head    ROS: Positive items marked in RED  CON: fever, chills  Cardiovascular: palpitations, CP  Resp: cough, SOB  GI: nausea, vomiting, abdominal pain  : dysuria, hematuria    Past Medical History:   Diagnosis Date    Diverticulosis     DM type 2 (diabetes mellitus, type 2) ([facility] [address] 2012    Goiter 1994    resolved    Hypercalcemia     nl PTH    Hyperlipidemia 2015    Hypertension 2010    Immunization refused     pt doesn't want any shots       Past Surgical History:   Procedure Laterality Date    HX CATARACT REMOVAL Bilateral 2000    Dr. Carolina Grullon  2014     [address]    Patient states Total hysterectomy       Family History   Problem Relation Age of Onset    Cancer Father     Breast Cancer Sister        Social History     Social History    Marital status: UNKNOWN     Spouse name: N/A    Number of children: N/A    Years of education: N/A     Occupational History    retired nurse      Social History Main Topics    Smoking status: Never Smoker    Smokeless tobacco: Never Used    Alcohol use No    Drug use: No    Sexual activity: No      Comment:      Other Topics Concern    None     Social History Narrative       Allergies   Allergen Reactions    Codeine Other (comments)     Felt like a different person, per patient    Iodine Itching    Pcn [Penicillins] Itching    Sulfa (Sulfonamide Antibiotics) Unknown (comments)    Vicodin [Hydrocodone-Acetaminophen] Nausea Only         Current Outpatient Prescriptions:     SITagliptin (JANUVIA) 100 mg tablet, Take 1 Tab by mouth daily. , Disp: 30 Tab, Rfl: 3    traZODone (DESYREL) 50 mg tablet, Take 1 Tab by mouth nightly., Disp: 30 Tab, Rfl: 1    empagliflozin (JARDIANCE) 10 mg tablet, Take  by mouth daily. , Disp: , Rfl:     Blood-Glucose Meter (TRUE METRIX AIR GLUCOSE METER) [facility], Use as directed to check home blood sugar., Disp: 1 Each, Rfl: 0    Blood Glucose Control, Low (TRUE METRIX LEVEL 1) soln, Use as directed to check home blood sugar, Disp: 1 Each, Rfl: 12    lancets (TRUEPLUS LANCETS) 33 gauge misc, Use daily to check blood sugar, Disp: 100 Lancet, Rfl: 3    acarbose (PRECOSE) 25 mg tablet, Take  by mouth., Disp: , Rfl:     famotidine (PEPCID) 20 mg tablet, Take 20 mg by mouth daily. , Disp: , Rfl:     TRUE METRIX GLUCOSE TEST STRIP strip, TEST BLOOD SUGAR ONE TIME DAILY, Disp: 100 Strip, Rfl: 3    amLODIPine (NORVASC) 5 mg tablet, Take 5 mg by mouth daily. , Disp: , Rfl:     atorvastatin (LIPITOR) 20 mg tablet, Take 1 Tab by mouth daily. , Disp: 30 Tab, Rfl: 5    benazepril (LOTENSIN) 40 mg tablet, Take 1 Tab by mouth daily. , Disp: 90 Tab, Rfl: 1    melatonin 10 mg cap, Take 10 mg by mouth nightly as needed. , Disp: 30 Cap, Rfl: 6    MULTIVIT-MIN/IRON/FOLIC/LUTEIN (CENTRUM SILVER WOMEN PO), Take 1 Tab by mouth daily. , Disp: , Rfl:     alcohol swabs (BD SINGLE USE SWABS REGULAR) padm, Use to clean finger to test blood sugar 1 time daily. , Disp: 100 Pad, Rfl: 3    aspirin delayed-release 81 mg tablet, Take  by mouth daily. , Disp: , Rfl:     omega-3 fatty acids-vitamin e (FISH OIL) 1,000 mg cap, One capsule daily. , Disp: 60 Cap, Rfl: 5    Physical Exam:      /79  Pulse 96  Temp 97 °F (36.1 °C) (Oral)   Resp 17  Ht 5' 2\" (1.575 m)  Wt 122 lb (55.3 kg)  BMI 22.31 kg/m2    General:  WD, WN, NAD  Eyes: sclera clear bilaterally, no discharge noted, eyelids normal in appearance  HENT: NCAT  Lungs: CTAB, Normal respiratory effort and rate  CV: RRR, no MRGs  ABD: soft, non-tender, non-distended, normal bowel sounds  Skin: normal temperature, turgor, color, and texture  Psych: alert and oriented to person, place and time, normal affect  Neuro: Speech normal, moving all extremities    CT OF HEAD WO CONT (3/16/2018): IMPRESSION:   1.  Ventricular morphology raises potential of normal pressure hydrocephalus. No  additional acute intracranial abnormality otherwise. No CT evidence to suggest  acute intracranial hematoma, cortical infarct, or mass effect/mass lesion. 2.  Mild cerebral atrophy/volume loss and moderately prominent periventricular  white matter changes, most commonly seen with chronic microvascular disease.     Assessment/Plan     Cognitive Impairment:  - Verified with MOCA exam on 1/30/2018 (Score was 24)  - Suspect patient's gradual decline is 2/2 dementia  - Changes on CT scan on 3/16/2018 are consistent with changes that occur with dementia  - Will refer to neurology for further evaluation  - Follow up in one month      Insomnia:  - Suspect her insomnia is a symptom of dementia  - Will prescribe benadryl for sleep   - Follow up as needed        Meli Fam MD  3/26/2018, 10:06 AM

## 2018-03-28 ENCOUNTER — PATIENT OUTREACH (OUTPATIENT)
Dept: FAMILY MEDICINE CLINIC | Age: 83
End: 2018-03-28

## 2018-03-29 NOTE — PROGRESS NOTES
Transitional Care Follow up         Patient called, c/o Benadryl did not work last night but worked really well the night before. Denies any side effects. Discussed with Luisa El, he explained to patient during her last visit, her insomnia maybe related to Dementia and this is why he referred her to Neurology for further evaluation of suspected Dementia. Luisa El would like patient to continue taking the Benadryl and I will FU on Neurology referral.    Ask Luisa El nurse, Mark Monreal if she could get authorization for patient 's Neurology appointment, I work on scheduling appointment. Discussed 's plan with patient and her daughter Lion Fung for patient to take Benadryl tonight before going to bed and reason for her Insomnia and Neurology follow up.

## 2018-03-30 ENCOUNTER — PATIENT OUTREACH (OUTPATIENT)
Dept: FAMILY MEDICINE CLINIC | Age: 83
End: 2018-03-30

## 2018-03-30 NOTE — PROGRESS NOTES
Transitional Care Follow up       Patient's daughter Edmund Fam is requesting a reference number for an [address] told her if she has a reference number she can receive an aid. After long conversation and explanation, Edmund Fam understand's she cannot received an aid without another skilled service in the home, Dru Foster [employer] was denied and they have to wait 80 before they can apply again. She received a call from a Dr. Troy Jeffrey this morning who is requesting a reference number from Norman Patten. Referred Jolene to call Jacki Vega for more information about who he is and why does he need a reference number. Neurology information given to Edmund Fam, encouraged her to call for sooner appointment. Made her aware, I am out of the office Monday. Conversation discussed with Norman Patten, he is also unaware of who Jacki Vega is. Patient during their last visit with him, requested he place a order for an aid, [employer] had authorized the aid they needed the referral.     Plan to FU next week.

## 2018-04-03 ENCOUNTER — PATIENT OUTREACH (OUTPATIENT)
Dept: FAMILY MEDICINE CLINIC | Age: 83
End: 2018-04-03

## 2018-04-03 NOTE — PROGRESS NOTES
Health Promotion and Risk Prevention    Called Patient to FU on calling Dr. Eller Halsted, patient's daughter Kayla Mendoza, called his number and was unable to leave a voice mail message, it required access code, she called [employer] and  was going to FU. Patient was seen by Dr. Francesca Tolentino yesterday, she was advised to walk nine minutes during the day and to got to bed at 10 pm, medications were not discussed. Sleep study evaluation is scheduled for May 30. Conversation with patient and daughter discussed with Lavelle Christensen. Will request Neurology note next week.

## 2018-04-06 ENCOUNTER — PATIENT OUTREACH (OUTPATIENT)
Dept: FAMILY MEDICINE CLINIC | Age: 83
End: 2018-04-06

## 2018-04-09 ENCOUNTER — PATIENT OUTREACH (OUTPATIENT)
Dept: FAMILY MEDICINE CLINIC | Age: 83
End: 2018-04-09

## 2018-04-09 RX ORDER — LANCETS 33 GAUGE
EACH MISCELLANEOUS
Qty: 100 LANCET | Refills: 3 | Status: SHIPPED | OUTPATIENT
Start: 2018-04-09 | End: 2018-04-12 | Stop reason: SDUPTHER

## 2018-04-09 NOTE — PROGRESS NOTES
Transitional Care Follow up         Referred by Erick Gallegos, patient is calling about her medications, talked with patient, initially she stated, she was out of Jardiance and needed a refill, explained Erick Rater did not order Jardiance, samples were given to her from [address] [place] [address], Erick Rater ordered Januvia, and there is Rx at the pharmacy, patient stated she called the pharmacy, she was advised by the pharmacist, Januvia Rx was cancelled. Called [facility] pharmacist, per pharmacist, patient called on 4/3/18 to advised Januvia was discontinued and she is now taking Jardiance, when discussed with patient, she did not remember calling the pharmacist.    Plan to discuss with Erick Gallegos on Monday, he is gone for the day.

## 2018-04-09 NOTE — PROGRESS NOTES
Transitional Care Follow up         Discussed with ,patient's request for appointment with him, he asked that I schedule patient for Wednesday, called patient, spoke with her daughter Rock Gaffney, scheduled patient for 4/11/18 @ 12:30pm.
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
## 2018-04-11 ENCOUNTER — OFFICE VISIT (OUTPATIENT)
Dept: FAMILY MEDICINE CLINIC | Age: 83
End: 2018-04-11

## 2018-04-11 ENCOUNTER — PATIENT OUTREACH (OUTPATIENT)
Dept: FAMILY MEDICINE CLINIC | Age: 83
End: 2018-04-11

## 2018-04-11 VITALS
TEMPERATURE: 98.7 F | RESPIRATION RATE: 16 BRPM | HEIGHT: 62 IN | WEIGHT: 120 LBS | DIASTOLIC BLOOD PRESSURE: 73 MMHG | BODY MASS INDEX: 22.08 KG/M2 | SYSTOLIC BLOOD PRESSURE: 115 MMHG | HEART RATE: 83 BPM

## 2018-04-11 DIAGNOSIS — E11.9 CONTROLLED TYPE 2 DIABETES MELLITUS WITHOUT COMPLICATION, WITHOUT LONG-TERM CURRENT USE OF INSULIN (HCC): ICD-10-CM

## 2018-04-11 DIAGNOSIS — K21.9 GASTROESOPHAGEAL REFLUX DISEASE, ESOPHAGITIS PRESENCE NOT SPECIFIED: ICD-10-CM

## 2018-04-11 DIAGNOSIS — F03.90 DEMENTIA WITHOUT BEHAVIORAL DISTURBANCE, UNSPECIFIED DEMENTIA TYPE: Primary | ICD-10-CM

## 2018-04-11 DIAGNOSIS — G47.00 INSOMNIA, UNSPECIFIED TYPE: ICD-10-CM

## 2018-04-11 RX ORDER — FAMOTIDINE 20 MG/1
20 TABLET, FILM COATED ORAL DAILY
Qty: 90 TAB | Refills: 1 | Status: SHIPPED | OUTPATIENT
Start: 2018-04-11 | End: 2018-07-18 | Stop reason: SDUPTHER

## 2018-04-11 RX ORDER — DONEPEZIL HYDROCHLORIDE 5 MG/1
5 TABLET, FILM COATED ORAL
Qty: 30 TAB | Refills: 1 | Status: SHIPPED | OUTPATIENT
Start: 2018-04-11 | End: 2018-07-02 | Stop reason: SDUPTHER

## 2018-04-11 NOTE — PROGRESS NOTES
1. Have you been to the ER, urgent care clinic since your last visit? Hospitalized since your last visit? No    2. Have you seen or consulted any other health care providers outside of the [facility] since your last visit? Include any pap smears or colon screening.  No

## 2018-04-11 NOTE — PROGRESS NOTES
Transitional Care Follow up         Saw [facility] discussed visit, patient very confused during visit. After long conversation, patient understood she has Dementia, many of her symptoms are related to her Dementia.  Rx her Aricept and took away Jardiance bottle , patient was agreeable to take Januvia. Called [facility] Pharmacist, she will make note, patient is not to take Jardiance, she needs a new Prescription for Januvia.

## 2018-04-11 NOTE — MR AVS SNAPSHOT
[address] [place] [id number] 
[phone] Patient: Mani Sawyer MRN: [id number] KZP:[id number] Visit Information Date & Time Provider Department Dept. Phone Encounter #  
 4/11/2018 12:30 PM Yusef James, 1500 [facility] [phone] [id number] Follow-up Instructions Return in about 1 month (around 5/11/2018). Your Appointments 4/18/2018  1:15 PM  
Follow Up with MD Bianca Aly 23 ([facility]) Appt Note: To include [address] [facility] [address] [place]. [address] 500 [facility]  
  
   
 [facility] Upcoming Health Maintenance Date Due Pneumococcal 65+ High/Highest Risk (1 of 2 - PCV13) 12/22/1994 FOOT EXAM Q1 12/8/2016 EYE EXAM RETINAL OR DILATED Q1 3/28/2017 Influenza Age 5 to Adult 8/1/2017 MICROALBUMIN Q1 12/16/2017 LIPID PANEL Q1 3/4/2018 MEDICARE YEARLY EXAM 3/21/2018 GLAUCOMA SCREENING Q2Y 3/28/2018 HEMOGLOBIN A1C Q6M 8/8/2018 DTaP/Tdap/Td series (2 - Td) 6/17/2026 Allergies as of 4/11/2018  Review Complete On: 4/11/2018 By: David Comer LPN Severity Noted Reaction Type Reactions Codeine  11/10/2015    Other (comments) Felt like a different person, per patient Iodine  11/10/2015    Itching Pcn [Penicillins]  11/10/2015    Itching Sulfa (Sulfonamide Antibiotics)  11/10/2015    Unknown (comments) Vicodin [Hydrocodone-acetaminophen]  11/10/2015    Nausea Only Current Immunizations  Reviewed on 3/26/2018 No immunizations on file. Not reviewed this visit You Were Diagnosed With   
  
 Codes Comments Dementia without behavioral disturbance, unspecified dementia type    -  Primary ICD-10-CM: F03.90 ICD-9-CM: 294.20 Gastroesophageal reflux disease, esophagitis presence not specified     ICD-10-CM: K21.9 ICD-9-CM: 530.81 Controlled type 2 diabetes mellitus without complication, without long-term current use of insulin ([facility].)     ICD-10-CM: E11.9 ICD-9-CM: 250.00 Insomnia, unspecified type     ICD-10-CM: G47.00 ICD-9-CM: 780.52 Vitals BP Pulse Temp Resp Height(growth percentile) Weight(growth percentile) 115/73 83 98.7 °F (37.1 °C) (Oral) 16 5' 2\" (1.575 m) 120 lb (54.4 kg) BMI OB Status Smoking Status 21.95 kg/m2 Hysterectomy Never Smoker Vitals History BMI and BSA Data Body Mass Index Body Surface Area  
 21.95 kg/m 2 1.54 m 2 Preferred Pharmacy Pharmacy Name Phone [facility][id number]- Iraida Bailey [phone] Your Updated Medication List  
  
   
This list is accurate as of 4/11/18 12:42 PM.  Always use your most recent med list.  
  
  
  
  
 acarbose 25 mg tablet Commonly known as:  PRECOSE Take  by mouth. alcohol swabs Padm Commonly known as:  BD Single Use Swabs Regular Use to clean finger to test blood sugar 1 time daily. amLODIPine 5 mg tablet Commonly known as:  Coffey Shield Take 5 mg by mouth daily. aspirin delayed-release 81 mg tablet Take  by mouth daily. atorvastatin 20 mg tablet Commonly known as:  LIPITOR Take 1 Tab by mouth daily. benazepril 40 mg tablet Commonly known as:  LOTENSIN Take 1 Tab by mouth daily. Blood Glucose Control, Low Soln Commonly known as:  TRUE METRIX LEVEL 1 Use as directed to check home blood sugar Blood-Glucose Meter Misc Commonly known as:  TRUE METRIX AIR GLUCOSE METER Use as directed to check home blood sugar. CENTRUM SILVER WOMEN PO Take 1 Tab by mouth daily. diphenhydrAMINE 50 mg tablet Commonly known as:  BENADRYL Take 1 Tab by mouth nightly as needed for Sleep.  
  
 donepezil 5 mg tablet Commonly known as:  ARICEPT Take 1 Tab by mouth nightly. famotidine 20 mg tablet Commonly known as:  PEPCID Take 1 Tab by mouth daily. FISH OIL 1,000 mg Cap Generic drug:  omega-3 fatty acids-vitamin e One capsule daily. glucose blood VI test strips strip Commonly known as:  TRUE METRIX GLUCOSE TEST STRIP  
TEST BLOOD SUGAR ONE TIME DAILY  
  
 lancets 33 gauge Misc Commonly known as:  Minor Ora Use daily to check blood sugar SITagliptin 100 mg tablet Commonly known as:  Siva Anjum Take 1 Tab by mouth daily. traZODone 50 mg tablet Commonly known as:  Florentina Runner Take 1 Tab by mouth nightly. Prescriptions Sent to Pharmacy Refills  
 famotidine (PEPCID) 20 mg tablet 1 Sig: Take 1 Tab by mouth daily. Class: Normal  
 Pharmacy: [address] [place] [address] Ph #: [phone] Route: Oral  
 donepezil (ARICEPT) 5 mg tablet 1 Sig: Take 1 Tab by mouth nightly. Class: Normal  
 Pharmacy: [address] [place] [address] Ph #: [phone] Route: Oral  
  
Follow-up Instructions Return in about 1 month (around 5/11/2018). Patient Instructions Dementia: Care Instructions Your Care Instructions Dementia is a loss of mental skills that affects your daily life. It is different than the occasional trouble with memory that is part of aging. You may find it hard to remember things that you feel you should be able to remember. Or you may feel that your mind is just not working as well as usual. 
Finding out that you have dementia is a shock. You may be afraid and worried about how the condition will change your life. Although there is no cure at this time, medicine may slow memory loss and improve thinking for a while. Other medicines may be able to help you sleep or cope with depression and behavior changes. Dementia often gets worse slowly. But it can get worse quickly.  As dementia gets worse, it may become harder to do common things that take planning, like making a list and going shopping. Over time, the disease may make it hard for you to take care of yourself. Some people with dementia need others to help care for them. Dementia is different for everyone. You may be able to function well for a long time. In the early stage of the condition, you can do things at home to make life easier and safer. You also can keep doing your hobbies and other activities. Many people find comfort in planning now for their future needs. Follow-up care is a key part of your treatment and safety. Be sure to make and go to all appointments, and call your doctor if you are having problems. It's also a good idea to know your test results and keep a list of the medicines you take. How can you care for yourself at home? · Take your medicines exactly as prescribed. Call your doctor if you think you are having a problem with your medicine. · Eat healthy foods. Eat lots of whole grains, fruits, and vegetables every day. If you are not hungry, try snacks or nutritional drinks such as Boost, Ensure, or Sustacal. 
· If you have problems sleeping: ¨ Try not to nap too close to your bedtime. ¨ Exercise regularly. Walking is a good choice. ¨ Try a glass of warm milk or caffeine-free herbal tea before bed. · Do tasks and activities during the time of day when you feel your best. It may help to develop a daily routine. · Post labels, lists, and sticky notes to help you remember things. Write your activities on a calendar you can easily find. Put your clock where you can easily see it. · Stay active. Take walks in familiar places, or with friends or loved ones. Try to stay active mentally too. Read and work crossword puzzles if you enjoy these activities. · Do not drive unless you can pass an on-road driving test. If you are not sure if you are safe to drive, your state 's license bureau can test you. · Keep a cordless phone and a flashlight with new batteries by your bed. If possible, put a phone in each of the main rooms of your house, or carry a cell phone in case you fall and cannot reach a phone. Or, you can wear a device around your neck or wrist. You push a button that sends a signal for help. Acknowledge your emotions and plan for the future · Talk openly and honestly with your doctor. · Let yourself grieve. It is common to feel angry, scared, frustrated, anxious, or depressed. · Get emotional support from family, friends, a support group, or a counselor experienced in working with people who have dementia. · Ask for help if you need it. · Plan for the future. ¨ Talk to your family and doctor about preparing a living will and other important papers while you can make decisions. These papers tell your doctors how to care for you at the end of your life. ¨ Consider naming a person to make decisions about your care if you are not able to. When should you call for help? Call 911 anytime you think you may need emergency care. For example, call if: 
? · You are lost and do not know whom to call. ? · You are injured and do not know whom to call. ?Call your doctor now or seek immediate medical care if: 
? · You are more confused or upset than usual.  
? · You feel like you could hurt yourself because your mind is not working well. ? Watch closely for changes in your health, and be sure to contact your doctor if you have any problems. Where can you learn more? Go to http://cori-rojas.info/. Enter V689 in the search box to learn more about \"Dementia: Care Instructions. \" Current as of: May 12, 2017 Content Version: 11.4 © 9637-7976 [employer].. Care instructions adapted under license by [employer] (which disclaims liability or warranty for this information).  If you have questions about a medical condition or this instruction, always ask your healthcare professional. [address] any warranty or liability for your use of this information. Helping A Person With Dementia: Care Instructions Your Care Instructions Dementia is a loss of mental skills that affects daily life. It is different from mild memory loss that occurs with aging. Dementia can cause problems with memory, thinking clearly, and planning. It is different for everyone. But it usually gets worse slowly. Some people who have dementia can function well for a long time. But at some point it may become hard for the person to care for himself or herself. It can be upsetting to learn that a loved one has this condition. You may be afraid and worried about what will happen. You may wonder how you will care for the person. There is no cure for dementia. But medicine may be able to slow memory loss and improve thinking for a while. Other medicines may help with sleep, depression, and behavior changes. Dementia is different for everyone. In some cases, people can function well for a long time. You can help your loved one by making his or her home life easier and safer. You also need to take care of yourself. Caregiving can be stressful. But support is available to help you and give you a break when you need it. The Alzheimer's Association offers good information and support. If you are caring for someone with dementia, you can help make life safer and more comfortable. You can also help your loved one make decisions about future care. You may also want to bring up legal and financial issues. These are hard but important conversations to have. Follow-up care is a key part of your loved one's treatment and safety. Be sure to make and go to all appointments, and call your doctor if your loved one is having problems. It's also a good idea to know your loved one's test results and keep a list of the medicines he or she takes. How can you care for your loved one at home? Taking care of the person · If the person takes medicine for dementia, help him or her take it exactly as prescribed. Call the doctor if you notice any problems with the medicine. · Make a list of the person's medicines. Review it with all of his or her doctors. · Help the person eat a balanced diet. Serve plenty of whole grains, fruits, and vegetables every day. If the person is not hungry at mealtimes, give snacks at midmorning and in the afternoon. Offer drinks such as Boost, Ensure, or Sustacal if the person is losing weight. · Encourage exercise. Walking and other activities may slow the decline of mental ability. Help the person stay active mentally with reading, crossword puzzles, or other hobbies. · Take steps to help if the person is sundowning. This is the restless behavior and trouble with sleeping that may occur in late afternoon and at night. Try not to let the person nap during the day. Offer a glass of warm milk or caffeine-free tea before bedtime. · Develop a routine. Your loved one will feel less frustrated or confused with a clear, simple plan of what to do every day. · Be patient. A task may take the person longer than it used to. · For as long as he or she is able, allow your loved one to make decisions about activities, food, clothing, and other choices. Let him or her be independent, even if tasks take more time or are not done perfectly. Tailor tasks to the person's abilities. For example, if cooking is no longer safe, ask for other help. Your loved one can help set the table, or make simple dishes such as a salad. When the person needs help, offer it gently. Staying safe · Make your home (or your loved one's home) safe. Tack down rugs, and put no-slip tape in the tub. Install handrails, and put safety switches on stoves and appliances. Keep rooms free of clutter.  Make sure walkways around furniture are clear. Do not move furniture around, because the person may become confused. · Use locks on doors and cupboards. Lock up knives, scissors, medicines, cleaning supplies, and other dangerous things. · Do not let the person drive or cook if he or she can't do it safely. A person with dementia should not drive unless he or she is able to pass an on-road driving test. Your state 's license bureau can do a driving test if there is any question. · Get medical alert jewelry for the person so that you can be contacted if he or she wanders away. If possible, provide a safe place for wandering, such as an enclosed yard or garden. Taking care of yourself · Ask your doctor about support groups and other resources in your area. · Take care of your health. Be sure to eat healthy foods and get enough rest and exercise. · Take time for yourself. Respite services provide someone to stay with the person for a short time while you get out of the house for a few hours. · Make time for an activity that you enjoy. Read, listen to music, paint, do crafts, or play an instrument, even if it's only for a few minutes a day. · Spend time with family, friends, and others in your support system. When should you call for help? Call 911 anytime you think the person may need emergency care. For example, call if: 
? · The person who has dementia wanders away and you can't find him or her. ? · The person who has dementia is seriously injured. ?Call the doctor now or seek immediate medical care if: 
? · The person suddenly sees things that are not there (hallucinates). ? · The person has a sudden change in his or her behavior. ? Watch closely for changes in the person's health, and be sure to contact the doctor if: 
? · The person has symptoms that could cause injury. ? · The person has problems with his or her medicine. ? · You need more information to care for a person with dementia. ? · You need respite care so you can take a break. Where can you learn more? Go to http://cori-rojas.info/. Enter Y745 in the search box to learn more about \"Helping A Person With Dementia: Care Instructions. \" Current as of: May 12, 2017 Content Version: 11.4 © 8265-5298 [employer]. Care instructions adapted under license by [employer] (which disclaims liability or warranty for this information). If you have questions about a medical condition or this instruction, always ask your healthcare professional. [address] any warranty or liability for your use of this information. Learning About Diabetes Food Guidelines Your Care Instructions Meal planning is important to manage diabetes. It helps keep your blood sugar at a target level (which you set with your doctor). You don't have to eat special foods. You can eat what your family eats, including sweets once in a while. But you do have to pay attention to how often you eat and how much you eat of certain foods. You may want to work with a dietitian or a certified diabetes educator (CDE) to help you plan meals and snacks. A dietitian or CDE can also help you lose weight if that is one of your goals. What should you know about eating carbs? Managing the amount of carbohydrate (carbs) you eat is an important part of healthy meals when you have diabetes. Carbohydrate is found in many foods. · Learn which foods have carbs. And learn the amounts of carbs in different foods. ¨ Bread, cereal, pasta, and rice have about 15 grams of carbs in a serving. A serving is 1 slice of bread (1 ounce), ½ cup of cooked cereal, or 1/3 cup of cooked pasta or rice. ¨ Fruits have 15 grams of carbs in a serving.  A serving is 1 small fresh fruit, such as an apple or orange; ½ of a banana; ½ cup of cooked or canned fruit; ½ cup of fruit juice; 1 cup of melon or raspberries; or 2 tablespoons of dried fruit. ¨ Milk and no-sugar-added yogurt have 15 grams of carbs in a serving. A serving is 1 cup of milk or 2/3 cup of no-sugar-added yogurt. ¨ Starchy vegetables have 15 grams of carbs in a serving. A serving is ½ cup of mashed potatoes or sweet potato; 1 cup winter squash; ½ of a small baked potato; ½ cup of cooked beans; or ½ cup cooked corn or green peas. · Learn how much carbs to eat each day and at each meal. A dietitian or CDE can teach you how to keep track of the amount of carbs you eat. This is called carbohydrate counting. · If you are not sure how to count carbohydrate grams, use the Plate Method to plan meals. It is a good, quick way to make sure that you have a balanced meal. It also helps you spread carbs throughout the day. ¨ Divide your plate by types of foods. Put non-starchy vegetables on half the plate, meat or other protein food on one-quarter of the plate, and a grain or starchy vegetable in the final quarter of the plate. To this you can add a small piece of fruit and 1 cup of milk or yogurt, depending on how many carbs you are supposed to eat at a meal. 
· Try to eat about the same amount of carbs at each meal. Do not \"save up\" your daily allowance of carbs to eat at one meal. 
· Proteins have very little or no carbs per serving. Examples of proteins are beef, chicken, turkey, fish, eggs, tofu, cheese, cottage cheese, and peanut butter. A serving size of meat is 3 ounces, which is about the size of a deck of cards. Examples of meat substitute serving sizes (equal to 1 ounce of meat) are 1/4 cup of cottage cheese, 1 egg, 1 tablespoon of peanut butter, and ½ cup of tofu. How can you eat out and still eat healthy? · Learn to estimate the serving sizes of foods that have carbohydrate. If you measure food at home, it will be easier to estimate the amount in a serving of restaurant food.  
· If the meal you order has too much carbohydrate (such as potatoes, corn, or baked beans), ask to have a low-carbohydrate food instead. Ask for a salad or green vegetables. · If you use insulin, check your blood sugar before and after eating out to help you plan how much to eat in the future. · If you eat more carbohydrate at a meal than you had planned, take a walk or do other exercise. This will help lower your blood sugar. What else should you know? · Limit saturated fat, such as the fat from meat and dairy products. This is a healthy choice because people who have diabetes are at higher risk of heart disease. So choose lean cuts of meat and nonfat or low-fat dairy products. Use olive or canola oil instead of butter or shortening when cooking. · Don't skip meals. Your blood sugar may drop too low if you skip meals and take insulin or certain medicines for diabetes. · Check with your doctor before you drink alcohol. Alcohol can cause your blood sugar to drop too low. Alcohol can also cause a bad reaction if you take certain diabetes medicines. Follow-up care is a key part of your treatment and safety. Be sure to make and go to all appointments, and call your doctor if you are having problems. It's also a good idea to know your test results and keep a list of the medicines you take. Where can you learn more? Go to http://cori-rojas.info/. Enter F148 in the search box to learn more about \"Learning About Diabetes Food Guidelines. \" Current as of: March 13, 2017 Content Version: 11.4 © 3390-0375 [employer], [email or web address]. Care instructions adapted under license by [employer] (which disclaims liability or warranty for this information). If you have questions about a medical condition or this instruction, always ask your healthcare professional. [place] [address] any warranty or liability for your use of this information. Learning About Meal Planning for Diabetes Why plan your meals? Meal planning can be a key part of managing diabetes. Planning meals and snacks with the right balance of carbohydrate, protein, and fat can help you keep your blood sugar at the target level you set with your doctor. You don't have to eat special foods. You can eat what your family eats, including sweets once in a while. But you do have to pay attention to how often you eat and how much you eat of certain foods. You may want to work with a dietitian or a certified diabetes educator. He or she can give you tips and meal ideas and can answer your questions about meal planning. This health professional can also help you reach a healthy weight if that is one of your goals. What plan is right for you? Your dietitian or diabetes educator may suggest that you start with the plate format or carbohydrate counting. The plate format The plate format is a simple way to help you manage how you eat. You plan meals by learning how much space each food should take on a plate. Using the plate format helps you spread carbohydrate throughout the day. It can make it easier to keep your blood sugar level within your target range. It also helps you see if you're eating healthy portion sizes. To use the plate format, you put non-starchy vegetables on half your plate. Add meat or meat substitutes on one-quarter of the plate. Put a grain or starchy vegetable (such as brown rice or a potato) on the final quarter of the plate. You can add a small piece of fruit and some low-fat or fat-free milk or yogurt, depending on your carbohydrate goal for each meal. 
Here are some tips for using the plate format: · Make sure that you are not using an oversized plate. A 9-inch plate is best. Many restaurants use larger plates. · Get used to using the plate format at home. Then you can use it when you eat out. · Write down your questions about using the plate format. Talk to your doctor, a dietitian, or a diabetes educator about your concerns. Carbohydrate counting With carbohydrate counting, you plan meals based on the amount of carbohydrate in each food. Carbohydrate raises blood sugar higher and more quickly than any other nutrient. It is found in desserts, breads and cereals, and fruit. It's also found in starchy vegetables such as potatoes and corn, grains such as rice and pasta, and milk and yogurt. Spreading carbohydrate throughout the day helps keep your blood sugar levels within your target range. Your daily amount depends on several things, including your weight, how active you are, which diabetes medicines you take, and what your goals are for your blood sugar levels. A registered dietitian or diabetes educator can help you plan how much carbohydrate to include in each meal and snack. A guideline for your daily amount of carbohydrate is: · 45 to 60 grams at each meal. That's about the same as 3 to 4 carbohydrate servings. · 15 to 20 grams at each snack. That's about the same as 1 carbohydrate serving. The Nutrition Facts label on packaged foods tells you how much carbohydrate is in a serving of the food. First, look at the serving size on the food label. Is that the amount you eat in a serving? All of the nutrition information on a food label is based on that serving size. So if you eat more or less than that, you'll need to adjust the other numbers. Total carbohydrate is the next thing you need to look for on the label. If you count carbohydrate servings, one serving of carbohydrate is 15 grams. For foods that don't come with labels, such as fresh fruits and vegetables, you'll need a guide that lists carbohydrate in these foods. Ask your doctor, dietitian, or diabetes educator about books or other nutrition guides you can use.  
If you take insulin, you need to know how many grams of carbohydrate are in a meal. This lets you know how much rapid-acting insulin to take before you eat. If you use an insulin pump, you get a constant rate of insulin during the day. So the pump must be programmed at meals to give you extra insulin to cover the rise in blood sugar after meals. When you know how much carbohydrate you will eat, you can take the right amount of insulin. Or, if you always use the same amount of insulin, you need to make sure that you eat the same amount of carbohydrate at meals. If you need more help to understand carbohydrate counting and food labels, ask your doctor, dietitian, or diabetes educator. How do you get started with meal planning? Here are some tips to get started: 
· Plan your meals a week at a time. Don't forget to include snacks too. · Use cookbooks or online recipes to plan several main meals. Plan some quick meals for busy nights. You also can double some recipes that freeze well. Then you can save half for other busy nights when you don't have time to cook. · Make sure you have the ingredients you need for your recipes. If you're running low on basic items, put these items on your shopping list too. · List foods that you use to make breakfasts, lunches, and snacks. List plenty of fruits and vegetables. · Post this list on the refrigerator. Add to it as you think of more things you need. · Take the list to the store to do your weekly shopping. Follow-up care is a key part of your treatment and safety. Be sure to make and go to all appointments, and call your doctor if you are having problems. It's also a good idea to know your test results and keep a list of the medicines you take. Where can you learn more? Go to http://cori-rojas.info/. Dara Lowe in the search box to learn more about \"Learning About Meal Planning for Diabetes. \" Current as of: March 13, 2017 Content Version: 11.4 © 6303-2586 Healthwise, Incorporated.  Care instructions adapted under license by [employer] (which disclaims liability or warranty for this information). If you have questions about a medical condition or this instruction, always ask your healthcare professional. [address] any warranty or liability for your use of this information. Insomnia: Care Instructions Your Care Instructions Insomnia is the inability to sleep well. It is a common problem for most people at some time. Insomnia may make it hard for you to get to sleep, stay asleep, or sleep as long as you need to. This can make you tired and grouchy during the day. It can also make you forgetful, less effective at work, and unhappy. Insomnia can be caused by conditions such as depression or anxiety. Pain can also affect your ability to sleep. When these problems are solved, the insomnia usually clears up. But sometimes bad sleep habits can cause insomnia. If insomnia is affecting your work or your enjoyment of life, you can take steps to improve your sleep. Follow-up care is a key part of your treatment and safety. Be sure to make and go to all appointments, and call your doctor if you are having problems. It's also a good idea to know your test results and keep a list of the medicines you take. How can you care for yourself at home? What to avoid · Do not have drinks with caffeine, such as coffee or black tea, for 8 hours before bed. · Do not smoke or use other types of tobacco near bedtime. Nicotine is a stimulant and can keep you awake. · Avoid drinking alcohol late in the evening, because it can cause you to wake in the middle of the night. · Do not eat a big meal close to bedtime. If you are hungry, eat a light snack. · Do not drink a lot of water close to bedtime, because the need to urinate may wake you up during the night. · Do not read or watch TV in bed. Use the bed only for sleeping and sexual activity. What to try · Go to bed at the same time every night, and wake up at the same time every morning. Do not take naps during the day. · Keep your bedroom quiet, dark, and cool. · Sleep on a comfortable pillow and mattress. · If watching the clock makes you anxious, turn it facing away from you so you cannot see the time. · If you worry when you lie down, start a worry book. Well before bedtime, write down your worries, and then set the book and your concerns aside. · Try meditation or other relaxation techniques before you go to bed. · If you cannot fall asleep, get up and go to another room until you feel sleepy. Do something relaxing. Repeat your bedtime routine before you go to bed again. · Make your house quiet and calm about an hour before bedtime. Turn down the lights, turn off the TV, log off the computer, and turn down the volume on music. This can help you relax after a busy day. When should you call for help? Watch closely for changes in your health, and be sure to contact your doctor if: 
? · Your efforts to improve your sleep do not work. ? · Your insomnia gets worse. ? · You have been feeling down, depressed, or hopeless or have lost interest in things that you usually enjoy. Where can you learn more? Go to http://cori-rojas.info/. Enter P513 in the search box to learn more about \"Insomnia: Care Instructions. \" Current as of: July 26, 2016 Content Version: 11.4 © 4529-5368 Healthwise, Incorporated. Care instructions adapted under license by [employer] (which disclaims liability or warranty for this information). If you have questions about a medical condition or this instruction, always ask your healthcare professional. [address] any warranty or liability for your use of this information. Learning About Sleeping Well What does sleeping well mean? Sleeping well means getting enough sleep. How much sleep is enough varies among people. The number of hours you sleep is not as important as how you feel when you wake up. If you do not feel refreshed, you probably need more sleep. Another sign of not getting enough sleep is feeling tired during the day. The average total nightly sleep time is 7½ to 8 hours. Healthy adults may need a little more or a little less than this. Why is getting enough sleep important? Getting enough quality sleep is a basic part of good health. When your sleep suffers, your mood and your thoughts can suffer too. You may find yourself feeling more grumpy or stressed. Not getting enough sleep also can lead to serious problems, including injury, accidents, anxiety, and depression. What might cause poor sleeping? Many things can cause sleep problems, including: · Stress. Stress can be caused by fear about a single event, such as giving a speech. Or you may have ongoing stress, such as worry about work or school. · Depression, anxiety, and other mental or emotional conditions. · Changes in your sleep habits or surroundings. This includes changes that happen where you sleep, such as noise, light, or sleeping in a different bed. It also includes changes in your sleep pattern, such as having jet lag or working a late shift. · Health problems, such as pain, breathing problems, and restless legs syndrome. · Lack of regular exercise. How can you help yourself? Here are some tips that may help you sleep more soundly and wake up feeling more refreshed. Your sleeping area · Use your bedroom only for sleeping and sex. A bit of light reading may help you fall asleep. But if it doesn't, do your reading elsewhere in the house. Don't watch TV in bed. · Be sure your bed is big enough to stretch out comfortably, especially if you have a sleep partner. · Keep your bedroom quiet, dark, and cool. Use curtains, blinds, or a sleep mask to block out light. To block out noise, use earplugs, soothing music, or a \"white noise\" machine. Your evening and bedtime routine · Create a relaxing bedtime routine. You might want to take a warm shower or bath, listen to soothing music, or drink a cup of noncaffeinated tea. · Go to bed at the same time every night. And get up at the same time every morning, even if you feel tired. What to avoid · Limit caffeine (coffee, tea, caffeinated sodas) during the day, and don't have any for at least 4 to 6 hours before bedtime. · Don't drink alcohol before bedtime. Alcohol can cause you to wake up more often during the night. · Don't smoke or use tobacco, especially in the evening. Nicotine can keep you awake. · Don't take naps during the day, especially close to bedtime. · Don't lie in bed awake for too long. If you can't fall asleep, or if you wake up in the middle of the night and can't get back to sleep within 15 minutes or so, get out of bed and go to another room until you feel sleepy. · Don't take medicine right before bed that may keep you awake or make you feel hyper or energized. Your doctor can tell you if your medicine may do this and if you can take it earlier in the day. If you can't sleep · Imagine yourself in a peaceful, pleasant scene. Focus on the details and feelings of being in a place that is relaxing. · Get up and do a quiet or boring activity until you feel sleepy. · Don't drink any liquids after 6 p.m. if you wake up often because you have to go to the bathroom. Where can you learn more? Go to http://cori-rojas.info/. Enter B742 in the search box to learn more about \"Learning About Sleeping Well. \" Current as of: May 12, 2017 Content Version: 11.4 © 8648-1649 [employer]. Care instructions adapted under license by [employer] (which disclaims liability or warranty for this information).  If you have questions about a medical condition or this instruction, always ask your healthcare professional. Ever[employer], Incorporated disclaims any warranty or liability for your use of this information. Introducing \[facility] [place] [facility]\"" & HEALTH SERVICES! Katherin Rodriguez introduces [facility] patient portal. Now you can access parts of your medical record, email your doctor's office, and request medication refills online. 1. In your internet browser, go to [email or web address] 2. Click on the First Time User? Click Here link in the Sign In box. You will see the New Member Sign Up page. 3. Enter your [facility] Access Code exactly as it appears below. You will not need to use this code after youve completed the sign-up process. If you do not sign up before the expiration date, you must request a new code. · [facility] Access Code: [email or web address] Expires: 6/11/2018  5:23 AM 
 
4. Enter the last four digits of your Social Security Number (xxxx) and Date of Birth (mm/dd/yyyy) as indicated and click Submit. You will be taken to the next sign-up page. 5. Create a [facility] ID. This will be your [facility] login ID and cannot be changed, so think of one that is secure and easy to remember. 6. Create a [facility] password. You can change your password at any time. 7. Enter your Password Reset Question and Answer. This can be used at a later time if you forget your password. 8. Enter your e-mail address. You will receive e-mail notification when new information is available in [address] 9. Click Sign Up. You can now view and download portions of your medical record. 10. Click the Download Summary menu link to download a portable copy of your medical information. If you have questions, please visit the Frequently Asked Questions section of the [employer] website. Remember, [facility] is NOT to be used for urgent needs. For medical emergencies, dial 911. Now available from your iPhone and Android! Please provide this summary of care documentation to your next provider. Your primary care clinician is listed as Allison Chavarria. If you have any questions after today's visit, please call [phone].

## 2018-04-12 DIAGNOSIS — E11.9 TYPE 2 DIABETES MELLITUS WITHOUT COMPLICATION, WITHOUT LONG-TERM CURRENT USE OF INSULIN (HCC): Primary | ICD-10-CM

## 2018-04-12 RX ORDER — BLOOD-GLUCOSE METER
EACH MISCELLANEOUS
Qty: 1 EACH | Refills: 0 | Status: SHIPPED | OUTPATIENT
Start: 2018-04-12

## 2018-04-12 RX ORDER — LANCETS 33 GAUGE
EACH MISCELLANEOUS
Qty: 100 LANCET | Refills: 3 | Status: SHIPPED | OUTPATIENT
Start: 2018-04-12

## 2018-04-12 RX ORDER — ISOPROPYL ALCOHOL 70 ML/100ML
SWAB TOPICAL
Qty: 100 PAD | Refills: 3 | Status: SHIPPED | OUTPATIENT
Start: 2018-04-12 | End: 2019-05-08 | Stop reason: SDUPTHER

## 2018-04-12 NOTE — TELEPHONE ENCOUNTER
Per pharmacy Medicare requires ICD 10 for diabetic supplies. Added codes to all supplies.  Please resend to pharmacy if appropriate

## 2018-04-13 ENCOUNTER — TELEPHONE (OUTPATIENT)
Dept: FAMILY MEDICINE CLINIC | Age: 83
End: 2018-04-13

## 2018-04-13 ENCOUNTER — HOME HEALTH ADMISSION (OUTPATIENT)
Dept: HOME HEALTH SERVICES | Facility: HOME HEALTH | Age: 83
End: 2018-04-13
Payer: MEDICARE

## 2018-04-13 DIAGNOSIS — F03.90 DEMENTIA WITHOUT BEHAVIORAL DISTURBANCE, UNSPECIFIED DEMENTIA TYPE: Primary | ICD-10-CM

## 2018-04-13 NOTE — TELEPHONE ENCOUNTER
lizzy called on pt. Something about orders for home health but they do not see that anything has been done. Says it is very important that this be expidited Please call Brianna Weber [phone]  Option 2 ext.  [phone]

## 2018-04-14 ENCOUNTER — HOME CARE VISIT (OUTPATIENT)
Dept: HOME HEALTH SERVICES | Facility: HOME HEALTH | Age: 83
End: 2018-04-14

## 2018-04-15 ENCOUNTER — HOME CARE VISIT (OUTPATIENT)
Dept: HOME HEALTH SERVICES | Facility: HOME HEALTH | Age: 83
End: 2018-04-15

## 2018-04-16 ENCOUNTER — TELEPHONE (OUTPATIENT)
Dept: FAMILY MEDICINE CLINIC | Age: 83
End: 2018-04-16

## 2018-04-16 NOTE — TELEPHONE ENCOUNTER
Per Uzair Gray of [facility] 2 attempts wee made to contact the patient. Please call her back at [phone].

## 2018-04-16 NOTE — TELEPHONE ENCOUNTER
Spoke with Fernando Patel ,another nurse will be calling the patient today, she asked for patient to call the office. Home Health number given to patient's daughter, Vivek Reid, she will call to set up Nurse visit. Jolene advised, patient slept good last night with taking the Aricept and Benadryl. Matty Mcneill notified.

## 2018-04-17 ENCOUNTER — HOME CARE VISIT (OUTPATIENT)
Dept: SCHEDULING | Facility: HOME HEALTH | Age: 83
End: 2018-04-17
Payer: MEDICARE

## 2018-04-17 ENCOUNTER — TELEPHONE (OUTPATIENT)
Dept: FAMILY MEDICINE CLINIC | Age: 83
End: 2018-04-17

## 2018-04-17 ENCOUNTER — HOME CARE VISIT (OUTPATIENT)
Dept: HOME HEALTH SERVICES | Facility: HOME HEALTH | Age: 83
End: 2018-04-17

## 2018-04-17 VITALS
HEART RATE: 87 BPM | OXYGEN SATURATION: 94 % | DIASTOLIC BLOOD PRESSURE: 70 MMHG | RESPIRATION RATE: 18 BRPM | TEMPERATURE: 98.7 F | SYSTOLIC BLOOD PRESSURE: 120 MMHG

## 2018-04-17 PROCEDURE — 3331090001 HH PPS REVENUE CREDIT

## 2018-04-17 PROCEDURE — 3331090002 HH PPS REVENUE DEBIT

## 2018-04-17 PROCEDURE — 400013 HH SOC

## 2018-04-17 PROCEDURE — G0299 HHS/HOSPICE OF RN EA 15 MIN: HCPCS

## 2018-04-17 NOTE — TELEPHONE ENCOUNTER
Called patient per Leelee Freeman request, patient states, she did not sleep well last night, took both Aricept and Benadryl together around 9:30 pm. Spoke with Brain Ocasio, patient's daughter, patient spend a day in her bed watching TV, she check on her mom around 11 pm and she was sound a sleep. Discussed with Brain Ocasio, patient needs to be up during the day and being active, not staying in bed,  She needs to go to bed later than 9:30 pm, to try 10 pm tonight, continue taking the  Aricept and Benadryl just before going to sleep. Left message with Desiree with [facility] to have OT call me before she makes her first visit.     Conversation and plan discussed with Leelee Freeman, he is agreeabe for OT to work with patient on [employer]

## 2018-04-18 ENCOUNTER — PATIENT OUTREACH (OUTPATIENT)
Dept: FAMILY MEDICINE CLINIC | Age: 83
End: 2018-04-18

## 2018-04-18 ENCOUNTER — HOME CARE VISIT (OUTPATIENT)
Dept: SCHEDULING | Facility: HOME HEALTH | Age: 83
End: 2018-04-18
Payer: MEDICARE

## 2018-04-18 ENCOUNTER — HOME CARE VISIT (OUTPATIENT)
Dept: HOME HEALTH SERVICES | Facility: HOME HEALTH | Age: 83
End: 2018-04-18
Payer: MEDICARE

## 2018-04-18 VITALS
HEART RATE: 81 BPM | TEMPERATURE: 98.4 F | SYSTOLIC BLOOD PRESSURE: 138 MMHG | DIASTOLIC BLOOD PRESSURE: 72 MMHG | OXYGEN SATURATION: 98 %

## 2018-04-18 PROCEDURE — 3331090002 HH PPS REVENUE DEBIT

## 2018-04-18 PROCEDURE — 3331090001 HH PPS REVENUE CREDIT

## 2018-04-18 PROCEDURE — G0151 HHCP-SERV OF PT,EA 15 MIN: HCPCS

## 2018-04-18 NOTE — PROGRESS NOTES
Transitional Care Follow up         Alix left voice mail, Emy Kruger need to sent Rx for glucose meter strips to [facility], cost at [facility] is $24 and it is free with [facility]. Called Alix, explained, there is already a Rx for 100  strips with [facility], sent 3/5/18 but if her mom has used them all, she may have to wait unit June to receive a refill. Alix will call [facility] andl et me know.

## 2018-04-19 ENCOUNTER — PATIENT OUTREACH (OUTPATIENT)
Dept: FAMILY MEDICINE CLINIC | Age: 83
End: 2018-04-19

## 2018-04-19 ENCOUNTER — HOME CARE VISIT (OUTPATIENT)
Dept: SCHEDULING | Facility: HOME HEALTH | Age: 83
End: 2018-04-19
Payer: MEDICARE

## 2018-04-19 PROCEDURE — 3331090001 HH PPS REVENUE CREDIT

## 2018-04-19 PROCEDURE — G0156 HHCP-SVS OF AIDE,EA 15 MIN: HCPCS

## 2018-04-19 PROCEDURE — 3331090002 HH PPS REVENUE DEBIT

## 2018-04-19 PROCEDURE — G0299 HHS/HOSPICE OF RN EA 15 MIN: HCPCS

## 2018-04-19 NOTE — PROGRESS NOTES
Transitional Care Follow up         Returned patient's call, c/o urinary incontinence at night  x 2 in the past week, does not drink before going to bed. Discussed importance of not drinking past 5 pm since she goes to sleep @ 9pm, will discussed with Jackelin Gilmore and calling her back tomorrow.

## 2018-04-20 ENCOUNTER — HOME CARE VISIT (OUTPATIENT)
Dept: HOME HEALTH SERVICES | Facility: HOME HEALTH | Age: 83
End: 2018-04-20
Payer: MEDICARE

## 2018-04-20 ENCOUNTER — PATIENT OUTREACH (OUTPATIENT)
Dept: FAMILY MEDICINE CLINIC | Age: 83
End: 2018-04-20

## 2018-04-20 ENCOUNTER — HOME CARE VISIT (OUTPATIENT)
Dept: SCHEDULING | Facility: HOME HEALTH | Age: 83
End: 2018-04-20
Payer: MEDICARE

## 2018-04-20 VITALS
HEART RATE: 82 BPM | OXYGEN SATURATION: 98 % | SYSTOLIC BLOOD PRESSURE: 140 MMHG | RESPIRATION RATE: 18 BRPM | DIASTOLIC BLOOD PRESSURE: 80 MMHG | TEMPERATURE: 97.8 F

## 2018-04-20 PROCEDURE — G0152 HHCP-SERV OF OT,EA 15 MIN: HCPCS

## 2018-04-20 PROCEDURE — 3331090002 HH PPS REVENUE DEBIT

## 2018-04-20 PROCEDURE — G0157 HHC PT ASSISTANT EA 15: HCPCS

## 2018-04-20 PROCEDURE — G0155 HHCP-SVS OF CSW,EA 15 MIN: HCPCS

## 2018-04-20 PROCEDURE — 3331090001 HH PPS REVENUE CREDIT

## 2018-04-20 NOTE — PROGRESS NOTES
Transitional Care Follow up         Received call form Rajni Milligan OT with [facility], discussed sleep Hygiene, she suggested Speech evaluation for Cognitive Impairment due to Dementia. Discussed with Deanna Parks, order faxed to [address] [place] [address] patient,spoke with her daughter Anna Wells, discussed patient's complaint of urinary incontinence x 2 with saturating her depends in the past 2 weeks, per Deanna Parks patient needs not to drink past supper time as I discussed with her mother, if she could help her mother remember.  Also discussed Plan for .

## 2018-04-21 PROCEDURE — 3331090001 HH PPS REVENUE CREDIT

## 2018-04-21 PROCEDURE — 3331090002 HH PPS REVENUE DEBIT

## 2018-04-22 VITALS
OXYGEN SATURATION: 97 % | HEART RATE: 91 BPM | DIASTOLIC BLOOD PRESSURE: 80 MMHG | SYSTOLIC BLOOD PRESSURE: 130 MMHG | TEMPERATURE: 98.3 F

## 2018-04-22 PROCEDURE — 3331090001 HH PPS REVENUE CREDIT

## 2018-04-22 PROCEDURE — 3331090002 HH PPS REVENUE DEBIT

## 2018-04-23 ENCOUNTER — HOME CARE VISIT (OUTPATIENT)
Dept: SCHEDULING | Facility: HOME HEALTH | Age: 83
End: 2018-04-23
Payer: MEDICARE

## 2018-04-23 ENCOUNTER — HOME CARE VISIT (OUTPATIENT)
Dept: HOME HEALTH SERVICES | Facility: HOME HEALTH | Age: 83
End: 2018-04-23
Payer: MEDICARE

## 2018-04-23 VITALS
SYSTOLIC BLOOD PRESSURE: 130 MMHG | OXYGEN SATURATION: 97 % | TEMPERATURE: 98 F | HEART RATE: 91 BPM | DIASTOLIC BLOOD PRESSURE: 80 MMHG

## 2018-04-23 VITALS
OXYGEN SATURATION: 96 % | TEMPERATURE: 97.6 F | RESPIRATION RATE: 14 BRPM | DIASTOLIC BLOOD PRESSURE: 91 MMHG | HEART RATE: 91 BPM | SYSTOLIC BLOOD PRESSURE: 145 MMHG

## 2018-04-23 VITALS
HEART RATE: 95 BPM | SYSTOLIC BLOOD PRESSURE: 130 MMHG | OXYGEN SATURATION: 98 % | TEMPERATURE: 98.1 F | DIASTOLIC BLOOD PRESSURE: 80 MMHG

## 2018-04-23 PROCEDURE — G0157 HHC PT ASSISTANT EA 15: HCPCS

## 2018-04-23 PROCEDURE — 3331090002 HH PPS REVENUE DEBIT

## 2018-04-23 PROCEDURE — 3331090001 HH PPS REVENUE CREDIT

## 2018-04-23 PROCEDURE — G0299 HHS/HOSPICE OF RN EA 15 MIN: HCPCS

## 2018-04-24 ENCOUNTER — PATIENT OUTREACH (OUTPATIENT)
Dept: FAMILY MEDICINE CLINIC | Age: 83
End: 2018-04-24

## 2018-04-24 PROCEDURE — 3331090001 HH PPS REVENUE CREDIT

## 2018-04-24 PROCEDURE — 3331090002 HH PPS REVENUE DEBIT

## 2018-04-24 NOTE — PROGRESS NOTES
Transitional Care Follow up         Patient called, she is c/o not sleeping and wants a different medication. Speech has not made contact with familly or patient. Discussed with , there are no other medications he can give her. Spoke with Desiree with [facility], they received ST order, appointment not scheduled at this time, discussed with Clearance Dove, she advised patient's sleep study appointment was re-schedule for tomorrow, there was a cancellation.

## 2018-04-25 ENCOUNTER — HOME CARE VISIT (OUTPATIENT)
Dept: SCHEDULING | Facility: HOME HEALTH | Age: 83
End: 2018-04-25
Payer: MEDICARE

## 2018-04-25 ENCOUNTER — PATIENT OUTREACH (OUTPATIENT)
Dept: FAMILY MEDICINE CLINIC | Age: 83
End: 2018-04-25

## 2018-04-25 PROCEDURE — 3331090001 HH PPS REVENUE CREDIT

## 2018-04-25 PROCEDURE — 3331090002 HH PPS REVENUE DEBIT

## 2018-04-25 PROCEDURE — G0157 HHC PT ASSISTANT EA 15: HCPCS

## 2018-04-25 NOTE — PROGRESS NOTES
Transitional Care Follow up         Patient's daughter Meka Luciano left voice mail message, patient was [facility] by  with [facility] today and was given Rx for Ambien and was advised to stop Aricept and Benadryl. She wants to know if her mother should take the Ambien, when they told Igor Ibrahim did not want patient on Ambien but she did not prescribed anything else. Should she take the Ambien? Called patient, she stopped taking the Aricept last night, c/o bad dreams and increase urination, Benadryl helped some. Will discuss with Josue Perez tomorrow.

## 2018-04-26 ENCOUNTER — HOME CARE VISIT (OUTPATIENT)
Dept: SCHEDULING | Facility: HOME HEALTH | Age: 83
End: 2018-04-26
Payer: MEDICARE

## 2018-04-26 ENCOUNTER — PATIENT OUTREACH (OUTPATIENT)
Dept: FAMILY MEDICINE CLINIC | Age: 83
End: 2018-04-26

## 2018-04-26 DIAGNOSIS — R35.0 URINARY FREQUENCY: Primary | ICD-10-CM

## 2018-04-26 PROCEDURE — 3331090002 HH PPS REVENUE DEBIT

## 2018-04-26 PROCEDURE — G0156 HHCP-SVS OF AIDE,EA 15 MIN: HCPCS

## 2018-04-26 PROCEDURE — 3331090001 HH PPS REVENUE CREDIT

## 2018-04-26 PROCEDURE — G0152 HHCP-SERV OF OT,EA 15 MIN: HCPCS

## 2018-04-26 NOTE — PROGRESS NOTES
Transitional Care Follow up         Discussed yesterday's conversation with patient with Bennett Swain, he does not want her taking Ambien and he will not prescribe Ambien, patient needs to come in for Increase Urination due potential UA from taking Jardiance. Discussed with patient, Bennett Swain wants her to stay on Aricept and Benadryl, and need for Urine Specimen. Aliyah Wren OT suggested the [facility] Nurse  collect Urine, discussed with Bennett Kiester, he is agreeable, order placed for UA and C&S. Spoke with Desiree with [facility], order given for [facility] Nurse to collect Urine and C&S, advised, order in [facility].

## 2018-04-27 ENCOUNTER — HOME CARE VISIT (OUTPATIENT)
Dept: SCHEDULING | Facility: HOME HEALTH | Age: 83
End: 2018-04-27
Payer: MEDICARE

## 2018-04-27 ENCOUNTER — HOSPITAL ENCOUNTER (OUTPATIENT)
Dept: LAB | Age: 83
Discharge: HOME | End: 2018-04-27
Payer: MEDICARE

## 2018-04-27 VITALS
TEMPERATURE: 95.8 F | HEART RATE: 84 BPM | OXYGEN SATURATION: 90 % | RESPIRATION RATE: 20 BRPM | SYSTOLIC BLOOD PRESSURE: 126 MMHG | DIASTOLIC BLOOD PRESSURE: 60 MMHG

## 2018-04-27 VITALS
OXYGEN SATURATION: 94 % | DIASTOLIC BLOOD PRESSURE: 72 MMHG | TEMPERATURE: 97.9 F | SYSTOLIC BLOOD PRESSURE: 130 MMHG | HEART RATE: 72 BPM

## 2018-04-27 LAB
APPEARANCE UR: CLEAR
BILIRUB UR QL: NEGATIVE
COLOR UR: YELLOW
GLUCOSE UR STRIP.AUTO-MCNC: NEGATIVE MG/DL
HGB UR QL STRIP: NEGATIVE
KETONES UR QL STRIP.AUTO: NEGATIVE MG/DL
LEUKOCYTE ESTERASE UR QL STRIP.AUTO: NEGATIVE
NITRITE UR QL STRIP.AUTO: NEGATIVE
PH UR STRIP: 5.5 [PH] (ref 5–8)
PROT UR STRIP-MCNC: NEGATIVE MG/DL
SP GR UR REFRACTOMETRY: 1.02 (ref 1–1.03)
UROBILINOGEN UR QL STRIP.AUTO: 0.2 EU/DL (ref 0.2–1)

## 2018-04-27 PROCEDURE — G0299 HHS/HOSPICE OF RN EA 15 MIN: HCPCS

## 2018-04-27 PROCEDURE — 81003 URINALYSIS AUTO W/O SCOPE: CPT | Performed by: FAMILY MEDICINE

## 2018-04-27 PROCEDURE — 3331090001 HH PPS REVENUE CREDIT

## 2018-04-27 PROCEDURE — 3331090002 HH PPS REVENUE DEBIT

## 2018-04-28 PROCEDURE — 3331090002 HH PPS REVENUE DEBIT

## 2018-04-28 PROCEDURE — 3331090001 HH PPS REVENUE CREDIT

## 2018-04-29 PROCEDURE — 3331090001 HH PPS REVENUE CREDIT

## 2018-04-29 PROCEDURE — 3331090002 HH PPS REVENUE DEBIT

## 2018-04-30 ENCOUNTER — HOME CARE VISIT (OUTPATIENT)
Dept: SCHEDULING | Facility: HOME HEALTH | Age: 83
End: 2018-04-30
Payer: MEDICARE

## 2018-04-30 VITALS
OXYGEN SATURATION: 98 % | SYSTOLIC BLOOD PRESSURE: 117 MMHG | TEMPERATURE: 98.5 F | HEART RATE: 96 BPM | DIASTOLIC BLOOD PRESSURE: 64 MMHG

## 2018-04-30 VITALS
DIASTOLIC BLOOD PRESSURE: 78 MMHG | TEMPERATURE: 98.2 F | SYSTOLIC BLOOD PRESSURE: 118 MMHG | HEART RATE: 89 BPM | OXYGEN SATURATION: 98 %

## 2018-04-30 PROCEDURE — 3331090002 HH PPS REVENUE DEBIT

## 2018-04-30 PROCEDURE — G0299 HHS/HOSPICE OF RN EA 15 MIN: HCPCS

## 2018-04-30 PROCEDURE — G0157 HHC PT ASSISTANT EA 15: HCPCS

## 2018-04-30 PROCEDURE — 3331090001 HH PPS REVENUE CREDIT

## 2018-05-01 ENCOUNTER — HOME CARE VISIT (OUTPATIENT)
Dept: SCHEDULING | Facility: HOME HEALTH | Age: 83
End: 2018-05-01
Payer: MEDICARE

## 2018-05-01 ENCOUNTER — TELEPHONE (OUTPATIENT)
Dept: FAMILY MEDICINE CLINIC | Age: 83
End: 2018-05-01

## 2018-05-01 VITALS
HEART RATE: 90 BPM | SYSTOLIC BLOOD PRESSURE: 130 MMHG | OXYGEN SATURATION: 97 % | DIASTOLIC BLOOD PRESSURE: 74 MMHG | TEMPERATURE: 98.6 F

## 2018-05-01 PROCEDURE — 3331090001 HH PPS REVENUE CREDIT

## 2018-05-01 PROCEDURE — 3331090002 HH PPS REVENUE DEBIT

## 2018-05-01 PROCEDURE — G0156 HHCP-SVS OF AIDE,EA 15 MIN: HCPCS

## 2018-05-02 ENCOUNTER — HOME CARE VISIT (OUTPATIENT)
Dept: HOME HEALTH SERVICES | Facility: HOME HEALTH | Age: 83
End: 2018-05-02
Payer: MEDICARE

## 2018-05-02 ENCOUNTER — HOME CARE VISIT (OUTPATIENT)
Dept: SCHEDULING | Facility: HOME HEALTH | Age: 83
End: 2018-05-02
Payer: MEDICARE

## 2018-05-02 PROCEDURE — 3331090002 HH PPS REVENUE DEBIT

## 2018-05-02 PROCEDURE — G0157 HHC PT ASSISTANT EA 15: HCPCS

## 2018-05-02 PROCEDURE — 3331090001 HH PPS REVENUE CREDIT

## 2018-05-02 PROCEDURE — G0153 HHCP-SVS OF S/L PATH,EA 15MN: HCPCS

## 2018-05-02 PROCEDURE — G0152 HHCP-SERV OF OT,EA 15 MIN: HCPCS

## 2018-05-03 ENCOUNTER — HOME CARE VISIT (OUTPATIENT)
Dept: SCHEDULING | Facility: HOME HEALTH | Age: 83
End: 2018-05-03
Payer: MEDICARE

## 2018-05-03 VITALS
HEART RATE: 87 BPM | OXYGEN SATURATION: 95 % | DIASTOLIC BLOOD PRESSURE: 75 MMHG | SYSTOLIC BLOOD PRESSURE: 150 MMHG | TEMPERATURE: 98.2 F

## 2018-05-03 VITALS
HEART RATE: 81 BPM | TEMPERATURE: 98.7 F | SYSTOLIC BLOOD PRESSURE: 120 MMHG | OXYGEN SATURATION: 95 % | DIASTOLIC BLOOD PRESSURE: 65 MMHG

## 2018-05-03 PROCEDURE — 3331090002 HH PPS REVENUE DEBIT

## 2018-05-03 PROCEDURE — G0156 HHCP-SVS OF AIDE,EA 15 MIN: HCPCS

## 2018-05-03 PROCEDURE — 3331090001 HH PPS REVENUE CREDIT

## 2018-05-04 VITALS
HEART RATE: 81 BPM | DIASTOLIC BLOOD PRESSURE: 65 MMHG | OXYGEN SATURATION: 95 % | SYSTOLIC BLOOD PRESSURE: 120 MMHG | TEMPERATURE: 98.7 F

## 2018-05-04 PROCEDURE — 3331090002 HH PPS REVENUE DEBIT

## 2018-05-04 PROCEDURE — 3331090001 HH PPS REVENUE CREDIT

## 2018-05-05 PROCEDURE — 3331090001 HH PPS REVENUE CREDIT

## 2018-05-05 PROCEDURE — 3331090002 HH PPS REVENUE DEBIT

## 2018-05-06 PROCEDURE — 3331090001 HH PPS REVENUE CREDIT

## 2018-05-06 PROCEDURE — 3331090002 HH PPS REVENUE DEBIT

## 2018-05-07 ENCOUNTER — HOME CARE VISIT (OUTPATIENT)
Dept: HOME HEALTH SERVICES | Facility: HOME HEALTH | Age: 83
End: 2018-05-07
Payer: MEDICARE

## 2018-05-07 ENCOUNTER — OFFICE VISIT (OUTPATIENT)
Dept: FAMILY MEDICINE CLINIC | Age: 83
End: 2018-05-07

## 2018-05-07 VITALS
SYSTOLIC BLOOD PRESSURE: 123 MMHG | WEIGHT: 119.6 LBS | DIASTOLIC BLOOD PRESSURE: 72 MMHG | HEIGHT: 62 IN | RESPIRATION RATE: 16 BRPM | BODY MASS INDEX: 22.01 KG/M2 | OXYGEN SATURATION: 98 % | TEMPERATURE: 98.2 F | HEART RATE: 71 BPM

## 2018-05-07 DIAGNOSIS — E11.8 TYPE 2 DIABETES MELLITUS WITH COMPLICATION, WITHOUT LONG-TERM CURRENT USE OF INSULIN (HCC): ICD-10-CM

## 2018-05-07 DIAGNOSIS — Z00.00 MEDICARE ANNUAL WELLNESS VISIT, SUBSEQUENT: Primary | ICD-10-CM

## 2018-05-07 DIAGNOSIS — Z28.21 PNEUMOCOCCAL VACCINATION DECLINED: ICD-10-CM

## 2018-05-07 PROCEDURE — 3331090002 HH PPS REVENUE DEBIT

## 2018-05-07 PROCEDURE — 3331090001 HH PPS REVENUE CREDIT

## 2018-05-07 NOTE — MR AVS SNAPSHOT
[address] [facility] [address]. [address] [facility] 83 [id number] 
[phone] Patient: Chritsina Kaplan MRN: [id number] HTS:[id number] Visit Information Date & Time Provider Department Dept. Phone Encounter #  
 5/7/2018 12:30 PM Tala Wood, [address] [address] Follow-up Instructions Return in about 3 months (around 8/7/2018). Upcoming Health Maintenance Date Due Pneumococcal 65+ High/Highest Risk (1 of 2 - PCV13) 12/22/1994 FOOT EXAM Q1 12/8/2016 EYE EXAM RETINAL OR DILATED Q1 3/28/2017 MICROALBUMIN Q1 12/16/2017 LIPID PANEL Q1 3/4/2018 MEDICARE YEARLY EXAM 3/21/2018 GLAUCOMA SCREENING Q2Y 3/28/2018 Influenza Age 5 to Adult 8/1/2018 HEMOGLOBIN A1C Q6M 8/8/2018 DTaP/Tdap/Td series (2 - Td) 6/17/2026 Allergies as of 5/7/2018  Review Complete On: 5/7/2018 By: Eddie Garcia LPN Severity Noted Reaction Type Reactions Codeine  11/10/2015    Other (comments) Felt like a different person, per patient Iodine  11/10/2015    Itching Pcn [Penicillins]  11/10/2015    Itching Sulfa (Sulfonamide Antibiotics)  11/10/2015    Unknown (comments) Vicodin [Hydrocodone-acetaminophen]  11/10/2015    Nausea Only Current Immunizations  Reviewed on 3/26/2018 No immunizations on file. Not reviewed this visit You Were Diagnosed With   
  
 Codes Comments Medicare annual wellness visit, subsequent    -  Primary ICD-10-CM: Z00.00 ICD-9-CM: V70.0 Pneumococcal vaccination declined     ICD-10-CM: E88.77 ICD-9-CM: V64.06 Type 2 diabetes mellitus with complication, without long-term current use of insulin (HCC)     ICD-10-CM: E11.8 ICD-9-CM: 250.90 Vitals BP Pulse Temp Resp Height(growth percentile) Weight(growth percentile)  123/72 (BP 1 Location: Left arm, BP Patient Position: Sitting) 71 98.2 °F (36.8 °C) (Oral) 16 5' 2\" (1.575 m) 119 lb 9.6 oz (54.3 kg) SpO2 BMI OB Status Smoking Status 98% 21.88 kg/m2 Hysterectomy Never Smoker Vitals History BMI and BSA Data Body Mass Index Body Surface Area  
 21.88 kg/m 2 1.54 m 2 Preferred Pharmacy Pharmacy Name Phone [id number] Dubon 7 [phone] Your Updated Medication List  
  
   
This list is accurate as of 5/7/18  1:09 PM.  Always use your most recent med list.  
  
  
  
  
 alcohol swabs Padm Commonly known as:  BD Single Use Swabs Regular Use to clean finger to test blood sugar 1 time daily for DM II E11.9. amLODIPine 5 mg tablet Commonly known as:  Houser Inks Take 5 mg by mouth daily. aspirin delayed-release 81 mg tablet Take 81 mg by mouth daily. atorvastatin 20 mg tablet Commonly known as:  LIPITOR Take 1 Tab by mouth daily. benazepril 40 mg tablet Commonly known as:  LOTENSIN Take 1 Tab by mouth daily. Blood Glucose Control, Low Soln Commonly known as:  TRUE METRIX LEVEL 1 Use as directed to check home blood sugar for DM II E11.9 Blood-Glucose Meter Misc Commonly known as:  TRUE METRIX AIR GLUCOSE METER Use as directed to check home blood sugar for DM II E11.9  
  
 diphenhydrAMINE 50 mg tablet Commonly known as:  BENADRYL Take 1 Tab by mouth nightly as needed for Sleep.  
  
 donepezil 5 mg tablet Commonly known as:  ARICEPT Take 1 Tab by mouth nightly. famotidine 20 mg tablet Commonly known as:  PEPCID Take 1 Tab by mouth daily. FISH OIL 1,000 mg Cap Generic drug:  omega-3 fatty acids-vitamin e Take 1 Cap by mouth daily. One capsule daily. glucose blood VI test strips strip Commonly known as:  TRUE METRIX GLUCOSE TEST STRIP  
TEST BLOOD SUGAR ONE TIME DAILY for DM II E11.9  
  
 lancets 33 gauge Misc Commonly known as:  Lori Schuler  
 Use daily to check blood sugar for DM II E11.9 MULTIVITAMINS W/C PO Take 1 Tab by mouth daily. SITagliptin 100 mg tablet Commonly known as:  Olivia Cleveland Take 1 Tab by mouth daily. Take 1 tab by mouth daily. Please discontinue all Jardiance Refills. We Performed the Following FULL CODE [COD2 Custom] HM DIABETES FOOT EXAM [HM7 Custom] REFERRAL TO ENT-OTOLARYNGOLOGY [WKO18 Custom] Comments:  
 Family concerned about patient hearing REFERRAL TO PODIATRY [REF90 Custom] Comments:  
 Abnormal diabetic foot exam. Loss of vibratory and dull/sharp perception. Follow-up Instructions Return in about 3 months (around 8/7/2018). To-Do List   
 05/07/2018 Lab:  MICROALBUMIN, UR, RAND W/ MICROALB/CREAT RATIO   
  
 05/07/2018 2:00 PM  
  Appointment with JULIANNA Ralph at [address] [place] [address] REG MED [facility]  
  
 05/09/2018 To Be Determined Appointment with JULIANNA Ralph at [address] [place] [address] REG MED [facility]  
  
 05/14/2018 To Be Determined Appointment with JULIANNA Ralph at [address] [place] [address] [facility]  
  
 05/21/2018 To Be Determined Appointment with JULIANNA Ralph at [address] [place] [address] Referral Information Referral ID Referred By Referred To  
  
 [id number] [address] [place] [address] Phone: [phone] Fax: [phone] Visits Status Start Date End Date 1 New Request 5/7/18 5/7/19 If your referral has a status of pending review or denied, additional information will be sent to support the outcome of this decision. Referral ID Referred By Referred To  
 [id number] Al LAFLEUR, [address] [place] [address] [address]  juan [place]aga, [address] Phone: [phone] Fax: [phone] Visits Status Start Date End Date 1 New Request 5/7/18 5/7/19 If your referral has a status of pending review or denied, additional information will be sent to support the outcome of this decision. Patient Instructions Medicare Wellness Visit, Female The best way to live healthy is to have a healthy lifestyle by eating a well-balanced diet, exercising regularly, limiting alcohol and stopping smoking. Regular physical exams and screening tests are another way to keep healthy. Preventive exams provided by your health care provider can find health problems before they become diseases or illnesses. Preventive services including immunizations, screening tests, monitoring and exams can help you take care of your own health. All people over age 72 should have a pneumovax  and and a prevnar shot to prevent pneumonia. These are once in a lifetime unless you and your provider decide differently. All people over 65 should have a yearly flu shot and a tetanus vaccine every 10 years. A bone mass density to screen for osteoporosis or thinning of the bones should be done every 2 years after 65. Screening for diabetes mellitus with a blood sugar test should be done every year. Glaucoma is a disease of the eye due to increased ocular pressure that can lead to blindness and it should be done every year by an eye professional. 
 
Cardiovascular screening tests that check for elevated lipids (fatty part of blood) which can lead to heart disease and strokes should be done every 5 years. Colorectal screening that evaluates for blood or polyps in your colon should be done yearly as a stool test or every five years as a flexible sigmoidoscope or every 10 years as a colonoscopy up to age 76. Breast cancer screening with a mammogram is recommended biennially  for women age 54-69.  
 
Screening for cervical cancer with a pap smear and pelvic exam is recommended for women after age 72 years every 2 years up to age 79 or when the provider and patient decide to stop. If there is a history of cervical abnormalities or other increased risk for cancer then the test is recommended yearly. Hepatitis C screening is also recommended for anyone born between [birth year] through [address]. A shingles vaccine is also recommended once in a lifetime after age 61. Your Medicare Wellness Exam is recommended annually. Here is a list of your current Health Maintenance items with a due date: 
Health Maintenance Due Topic Date Due  Pneumococcal Vaccine (1 of 2 - PCV13) 12/22/1994 Marvin Diabetic Foot Care  12/08/2016 [facility] Eye Exam  03/28/2017  Albumin Urine Test  12/16/2017  Cholesterol Test   03/04/2018 [facility] Annual Well Visit  03/21/2018  Glaucoma Screening   03/28/2018 Advance Care Planning: Care Instructions Your Care Instructions It can be hard to live with an illness that cannot be cured. But if your health is getting worse, you may want to make decisions about end-of-life care. Planning for the end of your life does not mean that you are giving up. It is a way to make sure that your wishes are met. Clearly stating your wishes can make it easier for your loved ones. Making plans while you are still able may also ease your mind and make your final days less stressful and more meaningful. Follow-up care is a key part of your treatment and safety. Be sure to make and go to all appointments, and call your doctor if you are having problems. It's also a good idea to know your test results and keep a list of the medicines you take. What can you do to plan for the end of life? · You can bring these issues up with your doctor. You do not need to wait until your doctor starts the conversation. You might start with \"I would not be willing to live with . Betha Lute Betha Lute Betha Lute \" When you complete this sentence it helps your doctor understand your wishes. · Talk openly and honestly with your doctor. This is the best way to understand the decisions you will need to make as your health changes. Know that you can always change your mind. · Ask your doctor about commonly used life-support measures. These include tube feedings, breathing machines, and fluids given through a vein (IV). Understanding these treatments will help you decide whether you want them. · You may choose to have these life-supporting treatments for a limited time. This allows a trial period to see whether they will help you. You may also decide that you want your doctor to take only certain measures to keep you alive. It is important to spell out these conditions so that your doctor and family understand them. · Talk to your doctor about how long you are likely to live. He or she may be able to give you an idea of what usually happens with your specific illness. · Think about preparing papers that state your wishes. This way there will not be any confusion about what you want. You can change your instructions at any time. Which papers should you prepare? Advance directives are legal papers that tell doctors how you want to be cared for at the end of your life. You do not need a  to write these papers. Ask your doctor or your state [facility] department for information on how to write your advance directives. They may have the forms for each of these types of papers. Make sure your doctor has a copy of these on file, and give a copy to a family member or close friend. · Consider a do-not-resuscitate order (DNR). This order asks that no extra treatments be done if your heart stops or you stop breathing. Extra treatments may include cardiopulmonary resuscitation (CPR), electrical shock to restart your heart, or a machine to breathe for you. If you decide to have a DNR order, ask your doctor to explain and write it. Place the order in your home where everyone can easily see it. · Consider a living will. A living will explains your wishes about life support and other treatments at the end of your life if you become unable to speak for yourself. Living martino tell doctors to use or not use treatments that would keep you alive. You must have one or two witnesses or a notary present when you sign this form. · Consider a durable power of  for health care. This allows you to name a person to make decisions about your care if you are not able to. Most people ask a close friend or family member. Talk to this person about the kinds of treatments you want and those that you do not want. Make sure this person understands your wishes. These legal papers are simple to change. Tell your doctor what you want to change, and ask him or her to make a note in your medical file. Give your family updated copies of the papers. Where can you learn more? Go to http://cori-rojas.info/. Enter P184 in the search box to learn more about \"Advance Care Planning: Care Instructions. \" Current as of: September 24, 2016 Content Version: 11.4 © 6293-8891 [employer] (formerly [employer]). Care instructions adapted under license by [employer] (which disclaims liability or warranty for this information). If you have questions about a medical condition or this instruction, always ask your healthcare professional. [place] [address] any warranty or liability for your use of this information. Advance Directives: Care Instructions Your Care Instructions An advance directive is a legal way to state your wishes at the end of your life. It tells your family and your doctor what to do if you can no longer say what you want. There are two main types of advance directives. You can change them any time that your wishes change. · A living will tells your family and your doctor your wishes about life support and other treatment. · A durable power of  for health care lets you name a person to make treatment decisions for you when you can't speak for yourself. This person is called a health care agent. If you do not have an advance directive, decisions about your medical care may be made by a doctor or a  who doesn't know you. It may help to think of an advance directive as a gift to the people who care for you. If you have one, they won't have to make tough decisions by themselves. Follow-up care is a key part of your treatment and safety. Be sure to make and go to all appointments, and call your doctor if you are having problems. It's also a good idea to know your test results and keep a list of the medicines you take. How can you care for yourself at home? · Discuss your wishes with your loved ones and your doctor. This way, there are no surprises. · Many states have a unique form. Or you might use a universal form that has been approved by many states. This kind of form can sometimes be completed and stored online. Your electronic copy will then be available wherever you have a connection to the Internet. In most cases, doctors will respect your wishes even if you have a form from a different state. · You don't need a  to do an advance directive. But you may want to get legal advice. · Think about these questions when you prepare an advance directive: ¨ Who do you want to make decisions about your medical care if you are not able to? Many people choose a family member or close friend. ¨ Do you know enough about life support methods that might be used? If not, talk to your doctor so you understand. ¨ What are you most afraid of that might happen? You might be afraid of having pain, losing your independence, or being kept alive by machines. ¨ Where would you prefer to die? Choices include your home, a hospital, or a nursing home.  
¨ Would you like to have information about hospice care to support you and your family? ¨ Do you want to donate organs when you die? ¨ Do you want certain Congregation practices performed before you die? If so, put your wishes in the advance directive. · Read your advance directive every year, and make changes as needed. When should you call for help? Be sure to contact your doctor if you have any questions. Where can you learn more? Go to http://cori-rojas.info/. Enter R264 in the search box to learn more about \"Advance Directives: Care Instructions. \" Current as of: September 24, 2016 Content Version: 11.4 © 1257-8661 [employer]. Care instructions adapted under license by "[employer] (which disclaims liability or warranty for this information). If you have questions about a medical condition or this instruction, always ask your healthcare professional. [address] any warranty or liability for your use of this information. Deciding About Being on a Ventilator When You Have a Terminal Illness Deciding About Being on a Ventilator When You Have a Terminal Illness ? Your Care Instructions ? A ventilator is a life-support machine that helps you breathe if you can no longer breathe on your own. The machine provides oxygen to your lungs through a tube. The tube enters your mouth and goes down your throat to your lungs. Most people on ventilators have to be fed through another tube that goes into the stomach. ?You may feel that being on a ventilator would prevent a \"natural\" death or would keep you alive longer than necessary. Or you may feel that being on a ventilator would extend your life so you can do certain things, such as saying good-bye to loved ones. ?The decision about whether to be on a ventilator is a personal one. Be sure to talk it over with your doctor and loved ones. ?Follow-up care is a key part of your treatment and safety.  Be sure to make and go to all appointments, and call your doctor if you are having problems. It's also a good idea to know your test results and keep a list of the medicines you take. ? Why might you want to be on a ventilator? ? · You think you may be able to return to your normal activities. ? · You need help breathing because of a short illness or a problem that is not related to your terminal illness. ? · You would like more time to say good-bye.  
? · You feel that there are more benefits than risks. ? Why might you not want to be on a ventilator? ? · You have other long-term health problems. ? · You may not be able to return to your normal activities. ? · You want a calm, peaceful death. You do not want to spend the rest of your life on a ventilator. ? · You feel that there are more risks than benefits. When should you call for help? ? Be sure to contact your doctor if: 
? · You want to learn more about being on a ventilator. ? · You change your mind about being on a ventilator. Where can you learn more? Go to http://cori-rojas.info/. Enter G403 in the search box to learn more about \"Deciding About Being on a Ventilator When You Have a Terminal Illness. \" Current as of: September 24, 2016 Content Version: 11.4 © 4818-8674 [employer]wise, Incorporated. Care instructions adapted under license by [employer] (which disclaims liability or warranty for this information). If you have questions about a medical condition or this instruction, always ask your healthcare professional. [place] [address] any warranty or liability for your use of this information. Introducing [facility]! [facility] introduces [employer] patient portal. Now you can access parts of your medical record, email your doctor's office, and request medication refills online. 1. In your internet browser, go to [email or web address] 2. Click on the First Time User? Click Here link in the Sign In box. You will see the New Member Sign Up page. 3. Enter your MediaPlatform Access Code exactly as it appears below. You will not need to use this code after youve completed the sign-up process. If you do not sign up before the expiration date, you must request a new code. · Media[employer] Access Code: [email or web address] Expires: 6/11/2018  5:23 AM 
 
4. Enter the last four digits of your Social Security Number (xxxx) and Date of Birth (mm/dd/yyyy) as indicated and click Submit. You will be taken to the next sign-up page. 5. Create a MediaPlatform ID. This will be your MediaPlatform login ID and cannot be changed, so think of one that is secure and easy to remember. 6. Create a [employer] password. You can change your password at any time. 7. Enter your Password Reset Question and Answer. This can be used at a later time if you forget your password. 8. Enter your e-mail address. You will receive e-mail notification when new information is available in [address] 9. Click Sign Up. You can now view and download portions of your medical record. 10. Click the Download Summary menu link to download a portable copy of your medical information. If you have questions, please visit the Frequently Asked Questions section of the [employer] website. Remember, [employer]Platform is NOT to be used for urgent needs. For medical emergencies, dial 911. Now available from your iPhone and Android! Please provide this summary of care documentation to your next provider. Your primary care clinician is listed as Sylvain Packer. If you have any questions after today's visit, please call [phone].

## 2018-05-07 NOTE — ACP (ADVANCE CARE PLANNING)
Advance Care Planning (ACP) Provider Note - Comprehensive     Date of ACP Conversation: 05/07/18  Persons included in Conversation:  patient and family  Length of ACP Conversation in minutes:  <16 minutes (Non-Billable)    Authorized Decision Maker (if patient is incapable of making informed decisions): This person is:  Brother - Wyatt Zuleta (POA - Primary), Daughter - Ander Boswell (Secondary).           General ACP for ALL Patients with Decision Making Capacity:   Exploration of values, goals, and preferences if recovery is not expected, even with continued medical treatment in the event of: Severe, permanent brain injury    Review of Existing Advance Directive:  N/A    For Serious or Chronic Illness:  Understanding of medical condition    Reviewed progression of demetia and need to plan ahead    Interventions Provided:  Recommended completion of Advance Directive form after review of ACP materials and conversation with prospective healthcare agent

## 2018-05-07 NOTE — PROGRESS NOTES
This is the Subsequent Medicare Annual Wellness Exam, performed 12 months or more after the Initial AWV or the last Subsequent AWV    I have reviewed the patient's medical history in detail and updated the computerized patient record. History     Past Medical History:   Diagnosis Date    Diverticulosis     DM type 2 (diabetes mellitus, type 2) ([facility] [address] 2012    Goiter 1994    resolved    Hypercalcemia     nl PTH    Hyperlipidemia 2015    Hypertension 2010    Immunization refused     pt doesn't want any shots      Past Surgical History:   Procedure Laterality Date    HX CATARACT REMOVAL Bilateral 2000    Dr. Liudmila Cadena  2014     [address]    Patient states Total hysterectomy     Current Outpatient Prescriptions   Medication Sig Dispense Refill    MULTIVITAMINS W/C PO Take 1 Tab by mouth daily.  Blood Glucose Control, Low (TRUE METRIX LEVEL 1) soln Use as directed to check home blood sugar for DM II E11.9 1 Each 12    glucose blood VI test strips (TRUE METRIX GLUCOSE TEST STRIP) strip TEST BLOOD SUGAR ONE TIME DAILY for DM II E11.9 100 Strip 3    lancets (TRUEPLUS LANCETS) 33 gauge misc Use daily to check blood sugar for DM II E11.9 100 Lancet 3    alcohol swabs (BD SINGLE USE SWABS REGULAR) padm Use to clean finger to test blood sugar 1 time daily for DM II E11.9. 100 Pad 3    famotidine (PEPCID) 20 mg tablet Take 1 Tab by mouth daily. 90 Tab 1    SITagliptin (JANUVIA) 100 mg tablet Take 1 Tab by mouth daily. Take 1 tab by mouth daily. Please discontinue all Jardiance Refills. 30 Tab 3    diphenhydrAMINE (BENADRYL) 50 mg tablet Take 1 Tab by mouth nightly as needed for Sleep. 60 Tab 1    amLODIPine (NORVASC) 5 mg tablet Take 5 mg by mouth daily.  atorvastatin (LIPITOR) 20 mg tablet Take 1 Tab by mouth daily. 30 Tab 5    benazepril (LOTENSIN) 40 mg tablet Take 1 Tab by mouth daily.  90 Tab 1    aspirin delayed-release 81 mg tablet Take 81 mg by mouth daily.  omega-3 fatty acids-vitamin e (FISH OIL) 1,000 mg cap Take 1 Cap by mouth daily. One capsule daily. 60 Cap 5    Blood-Glucose Meter (TRUE METRIX AIR GLUCOSE METER) misc Use as directed to check home blood sugar for DM II E11.9 1 Each 0    donepezil (ARICEPT) 5 mg tablet Take 1 Tab by mouth nightly. 30 Tab 1     Allergies   Allergen Reactions    Codeine Other (comments)     Felt like a different person, per patient    Iodine Itching    Pcn [Penicillins] Itching    Sulfa (Sulfonamide Antibiotics) Unknown (comments)    Vicodin [Hydrocodone-Acetaminophen] Nausea Only     Family History   Problem Relation Age of Onset    Cancer Father     Breast Cancer Sister      Social History   Substance Use Topics    Smoking status: Never Smoker    Smokeless tobacco: Never Used    Alcohol use No     Patient Active Problem List   Diagnosis Code    Hypertension I10    Hypercalcemia E83.52    Diabetes ([facility] [address] E11.9    Hyperlipidemia E78.5    Goiter E04.9    Diverticulosis K57.90    Immunization refused Z28.21    Diabetes mellitus type 2, controlled (HCC) E11.9    Thrombocytosis (HCC) D47.3    MGUS (monoclonal gammopathy of unknown significance) D47.2       Depression Risk Factor Screening:     PHQ over the last two weeks 5/7/2018   Little interest or pleasure in doing things Nearly every day   Feeling down, depressed or hopeless Nearly every day   Total Score PHQ 2 6     Alcohol Risk Factor Screening: You do not drink alcohol or very rarely. Functional Ability and Level of Safety:   Hearing Loss  Hearing is good. Activities of Daily Living  The home contains: no safety equipment. Patient needs help with:  Medication and traveling (Does not drive)    Fall Risk  Fall Risk Assessment, last 12 mths 5/7/2018   Able to walk? Yes   Fall in past 12 months? Yes   Fall with injury?  No   Number of falls in past 12 months 1   Fall Risk Score 1       Abuse Screen  Patient is not abused    Cognitive Screening   Evaluation of Cognitive Function:  Has your family/caregiver stated any concerns about your memory: yes  Abnormal (Patient previously test and scored a 24 on [address])    Patient Care Team   Patient Care Team:  Helga Nichole MD as PCP - General (Family Practice)  Divina Pino MD (Ophthalmology)  Sveta Garcia MD (Hematology and Oncology)  Nunu Jordan, WARD as Ambulatory Care Navigator  Alley Patrick MD as Physician (Neurology)  Mitchell Lee MD (Family Practice)    Assessment/Plan   Education and counseling provided:  Are appropriate based on today's review and evaluation  End-of-Life planning (with patient's consent)  Dementia    Diagnoses and all orders for this visit:    1. Medicare annual wellness visit, subsequent  -     FULL CODE  -     MICROALBUMIN, UR, RAND W/ MICROALB/CREAT RATIO; Future  -      DIABETES FOOT EXAM  -     REFERRAL TO ENT-OTOLARYNGOLOGY  -     REFERRAL TO PODIATRY    2. Pneumococcal vaccination declined    3. Type 2 diabetes mellitus with complication, without long-term current use of insulin (HCC)  -     MICROALBUMIN, UR, RAND W/ MICROALB/CREAT RATIO; Future  -      DIABETES FOOT EXAM  -     REFERRAL TO PODIATRY          Health Maintenance Due   Topic Date Due    Pneumococcal 65+ High/Highest Risk (1 of 2 - PCV13) 12/22/1994    FOOT EXAM Q1  12/08/2016    EYE EXAM RETINAL OR DILATED Q1  03/28/2017    MICROALBUMIN Q1  12/16/2017    LIPID PANEL Q1  03/04/2018    MEDICARE YEARLY EXAM  03/21/2018    GLAUCOMA SCREENING Q2Y  03/28/2018       Had diabetic eye exam at the first part of this year    Diabetic foot exam:     Left:    Vibratory sensation normal    Proprioception normal   Sharp/dull discrimination diminished    Filament test normal sensation with micro filament   Pulse DP: 2+ (normal)   Pulse PT: 2+ (normal)   Skin: Intact. No lesions or ulcers.     Right:    Vibratory sensation diminished   Proprioception normal   Sharp/dull discrimination diminished   Filament test normal sensation with micro filament   Pulse DP: 2+ (normal)   Pulse PT: 2+ (normal)   Skin: Intact. No lesion or ulcers.

## 2018-05-07 NOTE — PROGRESS NOTES
Chief Complaint   Patient presents with   Annia He Annual Wellness Visit     1. Have you been to the ER, urgent care clinic since your last visit? Hospitalized since your last visit? No    2. Have you seen or consulted any other health care providers outside of the [address] [place] [address] since your last visit? Include any pap smears or colon screening.  Yes When: 4/2018 Where: Dr. Madhuri Hoover- Sleep Medicine Reason for visit: sleep issues

## 2018-05-07 NOTE — PATIENT INSTRUCTIONS
Medicare Wellness Visit, Female    The best way to live healthy is to have a healthy lifestyle by eating a well-balanced diet, exercising regularly, limiting alcohol and stopping smoking. Regular physical exams and screening tests are another way to keep healthy. Preventive exams provided by your health care provider can find health problems before they become diseases or illnesses. Preventive services including immunizations, screening tests, monitoring and exams can help you take care of your own health. All people over age 72 should have a pneumovax  and and a prevnar shot to prevent pneumonia. These are once in a lifetime unless you and your provider decide differently. All people over 65 should have a yearly flu shot and a tetanus vaccine every 10 years. A bone mass density to screen for osteoporosis or thinning of the bones should be done every 2 years after 65. Screening for diabetes mellitus with a blood sugar test should be done every year. Glaucoma is a disease of the eye due to increased ocular pressure that can lead to blindness and it should be done every year by an eye professional.    Cardiovascular screening tests that check for elevated lipids (fatty part of blood) which can lead to heart disease and strokes should be done every 5 years. Colorectal screening that evaluates for blood or polyps in your colon should be done yearly as a stool test or every five years as a flexible sigmoidoscope or every 10 years as a colonoscopy up to age 76. Breast cancer screening with a mammogram is recommended biennially  for women age 54-69. Screening for cervical cancer with a pap smear and pelvic exam is recommended for women after age 72 years every 2 years up to age 79 or when the provider and patient decide to stop. If there is a history of cervical abnormalities or other increased risk for cancer then the test is recommended yearly.     Hepatitis C screening is also recommended for anyone born between [birth year] through [address]. A shingles vaccine is also recommended once in a lifetime after age 61. Your Medicare Wellness Exam is recommended annually. Here is a list of your current Health Maintenance items with a due date:  Health Maintenance Due   Topic Date Due    Pneumococcal Vaccine (1 of 2 - PCV13) 12/22/1994    Diabetic Foot Care  12/08/2016    Eye Exam  03/28/2017    Albumin Urine Test  12/16/2017    Cholesterol Test   03/04/2018    Annual Well Visit  03/21/2018    Glaucoma Screening   03/28/2018          Advance Care Planning: Care Instructions  Your Care Instructions    It can be hard to live with an illness that cannot be cured. But if your health is getting worse, you may want to make decisions about end-of-life care. Planning for the end of your life does not mean that you are giving up. It is a way to make sure that your wishes are met. Clearly stating your wishes can make it easier for your loved ones. Making plans while you are still able may also ease your mind and make your final days less stressful and more meaningful. Follow-up care is a key part of your treatment and safety. Be sure to make and go to all appointments, and call your doctor if you are having problems. It's also a good idea to know your test results and keep a list of the medicines you take. What can you do to plan for the end of life? · You can bring these issues up with your doctor. You do not need to wait until your doctor starts the conversation. You might start with \"I would not be willing to live with . Frutoso Golder Frutoso Golder Frutoso Golder \" When you complete this sentence it helps your doctor understand your wishes. · Talk openly and honestly with your doctor. This is the best way to understand the decisions you will need to make as your health changes. Know that you can always change your mind. · Ask your doctor about commonly used life-support measures.  These include tube feedings, breathing machines, and fluids given through a vein (IV). Understanding these treatments will help you decide whether you want them. · You may choose to have these life-supporting treatments for a limited time. This allows a trial period to see whether they will help you. You may also decide that you want your doctor to take only certain measures to keep you alive. It is important to spell out these conditions so that your doctor and family understand them. · Talk to your doctor about how long you are likely to live. He or she may be able to give you an idea of what usually happens with your specific illness. · Think about preparing papers that state your wishes. This way there will not be any confusion about what you want. You can change your instructions at any time. Which papers should you prepare? Advance directives are legal papers that tell doctors how you want to be cared for at the end of your life. You do not need a  to write these papers. Ask your doctor or your [facility] department for information on how to write your advance directives. They may have the forms for each of these types of papers. Make sure your doctor has a copy of these on file, and give a copy to a family member or close friend. · Consider a do-not-resuscitate order (DNR). This order asks that no extra treatments be done if your heart stops or you stop breathing. Extra treatments may include cardiopulmonary resuscitation (CPR), electrical shock to restart your heart, or a machine to breathe for you. If you decide to have a DNR order, ask your doctor to explain and write it. Place the order in your home where everyone can easily see it. · Consider a living will. A living will explains your wishes about life support and other treatments at the end of your life if you become unable to speak for yourself. Living martino tell doctors to use or not use treatments that would keep you alive.  You must have one or two witnesses or a notary present when you sign this form.  · Consider a durable power of  for health care. This allows you to name a person to make decisions about your care if you are not able to. Most people ask a close friend or family member. Talk to this person about the kinds of treatments you want and those that you do not want. Make sure this person understands your wishes. These legal papers are simple to change. Tell your doctor what you want to change, and ask him or her to make a note in your medical file. Give your family updated copies of the papers. Where can you learn more? Go to http://coriGMR Grouprojas.info/. Enter P184 in the search box to learn more about \"Advance Care Planning: Care Instructions. \"  Current as of: September 24, 2016  Content Version: 11.4  © 7100-8827 [employer]. Care instructions adapted under license by [employer] (which disclaims liability or warranty for this information). If you have questions about a medical condition or this instruction, always ask your healthcare professional. [place] [address] any warranty or liability for your use of this information. Advance Directives: Care Instructions  Your Care Instructions  An advance directive is a legal way to state your wishes at the end of your life. It tells your family and your doctor what to do if you can no longer say what you want. There are two main types of advance directives. You can change them any time that your wishes change. · A living will tells your family and your doctor your wishes about life support and other treatment. · A durable power of  for health care lets you name a person to make treatment decisions for you when you can't speak for yourself. This person is called a health care agent. If you do not have an advance directive, decisions about your medical care may be made by a doctor or a  who doesn't know you.   It may help to think of an advance directive as a gift to the people who care for you. If you have one, they won't have to make tough decisions by themselves. Follow-up care is a key part of your treatment and safety. Be sure to make and go to all appointments, and call your doctor if you are having problems. It's also a good idea to know your test results and keep a list of the medicines you take. How can you care for yourself at home? · Discuss your wishes with your loved ones and your doctor. This way, there are no surprises. · Many states have a unique form. Or you might use a universal form that has been approved by many states. This kind of form can sometimes be completed and stored online. Your electronic copy will then be available wherever you have a connection to the Internet. In most cases, doctors will respect your wishes even if you have a form from a different state. · You don't need a  to do an advance directive. But you may want to get legal advice. · Think about these questions when you prepare an advance directive:  ¨ Who do you want to make decisions about your medical care if you are not able to? Many people choose a family member or close friend. ¨ Do you know enough about life support methods that might be used? If not, talk to your doctor so you understand. ¨ What are you most afraid of that might happen? You might be afraid of having pain, losing your independence, or being kept alive by machines. ¨ Where would you prefer to die? Choices include your home, a hospital, or a nursing home. ¨ Would you like to have information about hospice care to support you and your family? ¨ Do you want to donate organs when you die? ¨ Do you want certain [employer] practices performed before you die? If so, put your wishes in the advance directive. · Read your advance directive every year, and make changes as needed. When should you call for help? Be sure to contact your doctor if you have any questions. Where can you learn more?   Go to http://cori-rojas.info/. Enter R264 in the search box to learn more about \"Advance Directives: Care Instructions. \"  Current as of: September 24, 2016  Content Version: 11.4  © 9405-6363 [employer]. Care instructions adapted under license by [employer] (which disclaims liability or warranty for this information). If you have questions about a medical condition or this instruction, always ask your healthcare professional. [place] [address] any warranty or liability for your use of this information. Deciding About Being on a Ventilator When You Have a Terminal Illness  Deciding About Being on a Ventilator When You Have a Terminal Illness  ? Your Care Instructions  ? A ventilator is a life-support machine that helps you breathe if you can no longer breathe on your own. The machine provides oxygen to your lungs through a tube. The tube enters your mouth and goes down your throat to your lungs. Most people on ventilators have to be fed through another tube that goes into the stomach. ?You may feel that being on a ventilator would prevent a \"natural\" death or would keep you alive longer than necessary. Or you may feel that being on a ventilator would extend your life so you can do certain things, such as saying good-bye to loved ones. ?The decision about whether to be on a ventilator is a personal one. Be sure to talk it over with your doctor and loved ones. ?Follow-up care is a key part of your treatment and safety. Be sure to make and go to all appointments, and call your doctor if you are having problems. It's also a good idea to know your test results and keep a list of the medicines you take. ? Why might you want to be on a ventilator? ? · You think you may be able to return to your normal activities. ? · You need help breathing because of a short illness or a problem that is not related to your terminal illness.    ? · You would like more time to say good-bye.   ? · You feel that there are more benefits than risks. ? Why might you not want to be on a ventilator? ? · You have other long-term health problems. ? · You may not be able to return to your normal activities. ? · You want a calm, peaceful death. You do not want to spend the rest of your life on a ventilator. ? · You feel that there are more risks than benefits. When should you call for help? ? Be sure to contact your doctor if:  ? · You want to learn more about being on a ventilator. ? · You change your mind about being on a ventilator. Where can you learn more? Go to http://cori-rojas.info/. Enter B974 in the search box to learn more about \"Deciding About Being on a Ventilator When You Have a Terminal Illness. \"  Current as of: September 24, 2016  Content Version: 11.4  © 2040-6214 [employer]. Care instructions adapted under license by [employer] (which disclaims liability or warranty for this information). If you have questions about a medical condition or this instruction, always ask your healthcare professional. [place] [address] any warranty or liability for your use of this information.

## 2018-05-08 LAB
ALBUMIN/CREAT UR: 18.4 MG/G CREAT (ref 0–30)
CREAT UR-MCNC: 122.4 MG/DL
MICROALBUMIN UR-MCNC: 22.5 UG/ML

## 2018-05-08 PROCEDURE — 3331090001 HH PPS REVENUE CREDIT

## 2018-05-08 PROCEDURE — 3331090002 HH PPS REVENUE DEBIT

## 2018-05-09 ENCOUNTER — HOME CARE VISIT (OUTPATIENT)
Dept: SCHEDULING | Facility: HOME HEALTH | Age: 83
End: 2018-05-09
Payer: MEDICARE

## 2018-05-09 PROCEDURE — G0153 HHCP-SVS OF S/L PATH,EA 15MN: HCPCS

## 2018-05-09 PROCEDURE — 3331090002 HH PPS REVENUE DEBIT

## 2018-05-09 PROCEDURE — G0299 HHS/HOSPICE OF RN EA 15 MIN: HCPCS

## 2018-05-09 PROCEDURE — 3331090001 HH PPS REVENUE CREDIT

## 2018-05-10 VITALS
DIASTOLIC BLOOD PRESSURE: 89 MMHG | HEART RATE: 94 BPM | TEMPERATURE: 99.2 F | OXYGEN SATURATION: 95 % | SYSTOLIC BLOOD PRESSURE: 149 MMHG

## 2018-05-10 PROCEDURE — 3331090002 HH PPS REVENUE DEBIT

## 2018-05-10 PROCEDURE — 3331090001 HH PPS REVENUE CREDIT

## 2018-05-11 ENCOUNTER — HOME CARE VISIT (OUTPATIENT)
Dept: SCHEDULING | Facility: HOME HEALTH | Age: 83
End: 2018-05-11
Payer: MEDICARE

## 2018-05-11 VITALS
TEMPERATURE: 98.8 F | HEART RATE: 94 BPM | DIASTOLIC BLOOD PRESSURE: 83 MMHG | SYSTOLIC BLOOD PRESSURE: 151 MMHG | OXYGEN SATURATION: 98 %

## 2018-05-11 PROCEDURE — 3331090002 HH PPS REVENUE DEBIT

## 2018-05-11 PROCEDURE — 3331090001 HH PPS REVENUE CREDIT

## 2018-05-11 PROCEDURE — G0153 HHCP-SVS OF S/L PATH,EA 15MN: HCPCS

## 2018-05-12 PROCEDURE — 3331090001 HH PPS REVENUE CREDIT

## 2018-05-12 PROCEDURE — 3331090002 HH PPS REVENUE DEBIT

## 2018-05-13 PROCEDURE — 3331090002 HH PPS REVENUE DEBIT

## 2018-05-13 PROCEDURE — 3331090001 HH PPS REVENUE CREDIT

## 2018-05-14 ENCOUNTER — HOME CARE VISIT (OUTPATIENT)
Dept: SCHEDULING | Facility: HOME HEALTH | Age: 83
End: 2018-05-14
Payer: MEDICARE

## 2018-05-14 PROCEDURE — 3331090001 HH PPS REVENUE CREDIT

## 2018-05-14 PROCEDURE — 3331090002 HH PPS REVENUE DEBIT

## 2018-05-14 PROCEDURE — G0153 HHCP-SVS OF S/L PATH,EA 15MN: HCPCS

## 2018-05-15 PROCEDURE — 3331090002 HH PPS REVENUE DEBIT

## 2018-05-15 PROCEDURE — 3331090001 HH PPS REVENUE CREDIT

## 2018-05-16 PROCEDURE — 3331090002 HH PPS REVENUE DEBIT

## 2018-05-16 PROCEDURE — 3331090001 HH PPS REVENUE CREDIT

## 2018-05-17 PROCEDURE — 3331090001 HH PPS REVENUE CREDIT

## 2018-05-17 PROCEDURE — 3331090002 HH PPS REVENUE DEBIT

## 2018-05-18 ENCOUNTER — TELEPHONE (OUTPATIENT)
Dept: FAMILY MEDICINE CLINIC | Age: 83
End: 2018-05-18

## 2018-05-18 PROCEDURE — 3331090001 HH PPS REVENUE CREDIT

## 2018-05-18 PROCEDURE — 3331090002 HH PPS REVENUE DEBIT

## 2018-05-18 NOTE — TELEPHONE ENCOUNTER
Patient states she is no longer having dizziness. Her blood pressure today was 122/85 and she would like to know if she is to take her blood pressure medication today. Per Bryson Miguel MD patient can hold her Benazepril 40 mg today. Patient verbalized understanding without any further questions or concerns.

## 2018-05-19 PROCEDURE — 3331090001 HH PPS REVENUE CREDIT

## 2018-05-19 PROCEDURE — 3331090002 HH PPS REVENUE DEBIT

## 2018-05-20 PROCEDURE — 3331090002 HH PPS REVENUE DEBIT

## 2018-05-20 PROCEDURE — 3331090001 HH PPS REVENUE CREDIT

## 2018-05-21 PROCEDURE — 3331090001 HH PPS REVENUE CREDIT

## 2018-05-21 PROCEDURE — 3331090002 HH PPS REVENUE DEBIT

## 2018-05-22 PROCEDURE — 3331090002 HH PPS REVENUE DEBIT

## 2018-05-22 PROCEDURE — 3331090001 HH PPS REVENUE CREDIT

## 2018-05-23 ENCOUNTER — HOME CARE VISIT (OUTPATIENT)
Dept: SCHEDULING | Facility: HOME HEALTH | Age: 83
End: 2018-05-23
Payer: MEDICARE

## 2018-05-23 ENCOUNTER — OFFICE VISIT (OUTPATIENT)
Dept: FAMILY MEDICINE CLINIC | Age: 83
End: 2018-05-23

## 2018-05-23 VITALS
HEIGHT: 62 IN | TEMPERATURE: 97.2 F | HEART RATE: 92 BPM | DIASTOLIC BLOOD PRESSURE: 79 MMHG | RESPIRATION RATE: 16 BRPM | SYSTOLIC BLOOD PRESSURE: 131 MMHG

## 2018-05-23 DIAGNOSIS — E11.9 TYPE 2 DIABETES MELLITUS WITHOUT COMPLICATION, WITHOUT LONG-TERM CURRENT USE OF INSULIN (HCC): ICD-10-CM

## 2018-05-23 DIAGNOSIS — R82.71 BACTERIURIA: ICD-10-CM

## 2018-05-23 DIAGNOSIS — R81 GLUCOSURIA: ICD-10-CM

## 2018-05-23 DIAGNOSIS — G47.00 INSOMNIA, UNSPECIFIED TYPE: Primary | ICD-10-CM

## 2018-05-23 DIAGNOSIS — R53.83 FATIGUE, UNSPECIFIED TYPE: ICD-10-CM

## 2018-05-23 DIAGNOSIS — R73.9 ELEVATED BLOOD SUGAR: ICD-10-CM

## 2018-05-23 LAB
GLUCOSE POC: 330 MG/DL
HBA1C MFR BLD HPLC: 7.8 %

## 2018-05-23 PROCEDURE — 3331090002 HH PPS REVENUE DEBIT

## 2018-05-23 PROCEDURE — G0153 HHCP-SVS OF S/L PATH,EA 15MN: HCPCS

## 2018-05-23 PROCEDURE — 3331090003 HH PPS REVENUE ADJ

## 2018-05-23 PROCEDURE — 3331090001 HH PPS REVENUE CREDIT

## 2018-05-23 RX ORDER — LORAZEPAM 0.5 MG/1
TABLET ORAL
Refills: 0 | COMMUNITY
Start: 2018-05-20 | End: 2018-10-22

## 2018-05-23 NOTE — PROGRESS NOTES
[facility]    CC: F/U of Insomnia    HPI:     - Reports insomnia is unchanged  - Has not been following lifestyle recommendations  - Went to ED on 5/20/2018 for her insomnia  - Was given Ativan to help her sleep  - Patient reports that Ativan only helped her sleep in ED, but has not helped her sleep at home  - UA done in ED  - Has not been taking donepezil for her dementia  - Seeing social sevices and senior services  - Apply for medicaid  - Daughter is unsure if mother is actually taking her diabetic medication      ROS: Positive items marked in RED  CON: fever, chills, fatigue  Cardiovascular: palpitations, CP  Resp: cough, SOB  GI: nausea, vomiting, diarrhea  : dysuria, hematuria      Past Medical History:   Diagnosis Date    Diverticulosis     DM type 2 (diabetes mellitus, type 2) ([facility] [address] 2012    Goiter 1994    resolved    Hypercalcemia     nl PTH    Hyperlipidemia 2015    Hypertension 2010    Immunization refused     pt doesn't want any shots       Past Surgical History:   Procedure Laterality Date    HX CATARACT REMOVAL Bilateral 2000    Dr. Carin Montes  2014    [address] [facility]    Patient states Total hysterectomy       Family History   Problem Relation Age of Onset    Cancer Father     Breast Cancer Sister        Social History     Social History    Marital status: UNKNOWN     Spouse name: N/A    Number of children: N/A    Years of education: N/A     Occupational History    retired nurse      Social History Main Topics    Smoking status: Never Smoker    Smokeless tobacco: Never Used    Alcohol use No    Drug use: No    Sexual activity: No      Comment:      Other Topics Concern    None     Social History Narrative       Allergies   Allergen Reactions    Codeine Other (comments)     Felt like a different person, per patient    Iodine Itching    Pcn [Penicillins] Itching    Sulfa (Sulfonamide Antibiotics) Unknown (comments)    Vicodin [Hydrocodone-Acetaminophen] Nausea Only         Current Outpatient Prescriptions:     LORazepam (ATIVAN) 0.5 mg tablet, TAKE 1 TAB BY MOUTH EVERY NIGHT AT BEDTIME, Disp: , Rfl: 0    MULTIVITAMINS W/C PO, Take 1 Tab by mouth daily. , Disp: , Rfl:     Blood-Glucose Meter (TRUE METRIX AIR GLUCOSE METER) [facility], Use as directed to check home blood sugar for DM II E11.9, Disp: 1 Each, Rfl: 0    Blood Glucose Control, Low (TRUE METRIX LEVEL 1) soln, Use as directed to check home blood sugar for DM II E11.9, Disp: 1 Each, Rfl: 12    glucose blood VI test strips (TRUE METRIX GLUCOSE TEST STRIP) strip, TEST BLOOD SUGAR ONE TIME DAILY for DM II E11.9, Disp: 100 Strip, Rfl: 3    lancets (TRUEPLUS LANCETS) 33 gauge misc, Use daily to check blood sugar for DM II E11.9, Disp: 100 Lancet, Rfl: 3    alcohol swabs (BD SINGLE USE SWABS REGULAR) pad, Use to clean finger to test blood sugar 1 time daily for DM II E11.9., Disp: 100 Pad, Rfl: 3    famotidine (PEPCID) 20 mg tablet, Take 1 Tab by mouth daily. , Disp: 90 Tab, Rfl: 1    donepezil (ARICEPT) 5 mg tablet, Take 1 Tab by mouth nightly., Disp: 30 Tab, Rfl: 1    SITagliptin (JANUVIA) 100 mg tablet, Take 1 Tab by mouth daily. Take 1 tab by mouth daily. Please discontinue all Jardiance Refills. , Disp: 30 Tab, Rfl: 3    diphenhydrAMINE (BENADRYL) 50 mg tablet, Take 1 Tab by mouth nightly as needed for Sleep., Disp: 60 Tab, Rfl: 1    amLODIPine (NORVASC) 5 mg tablet, Take 5 mg by mouth daily. , Disp: , Rfl:     atorvastatin (LIPITOR) 20 mg tablet, Take 1 Tab by mouth daily. , Disp: 30 Tab, Rfl: 5    benazepril (LOTENSIN) 40 mg tablet, Take 1 Tab by mouth daily. , Disp: 90 Tab, Rfl: 1    aspirin delayed-release 81 mg tablet, Take 81 mg by mouth daily. , Disp: , Rfl:     omega-3 fatty acids-vitamin e (FISH OIL) 1,000 mg cap, Take 1 Cap by mouth daily. One capsule daily. , Disp: 60 Cap, Rfl: 5    Physical Exam:      /79  Pulse 92 Temp 97.2 °F (36.2 °C) (Oral)   Resp 16  Ht 5' 2\" (1.575 m)    General:  Laying down on bed with eyes closed. Fidgeting a lot. Eyes: sclera clear bilaterally, no discharge noted, eyelids normal in appearance  HENT: NCAT  Lungs: CTAB, Normal respiratory effort and rate  CV: RRR, no MRGs  ABD: soft, non-tender, non-distended, normal bowel sounds  Ext: no peripheral edema or digital cyanosis noted  Skin: normal temperature, turgor, color, and texture  Neuro: Speech normal, Moving all extremities    URINE CULTURE & SENSITIVITY (5/20/2018): [address]  Component Name Value Ref Range   URINE CULTURE Mixed Culture     Specimen   Urine - Clean Catch Urine   Result Narrative   >100,000 col/mL, more than 2 different organisms.  Culture appears contaminated with skin mayra. U/A (5/20/2018):  Urine pH 6.0   Urine Protein Screen Negative   Urine Glucose 50* (A)   Urine Ketones Negative   Urine Occult Blood Negative   Urine Specific Gravity 1.010   Urine Nitrite Negative   Urine Leukocyte Esterase SMALL (A)   Urine Bilirubin Negative   Urine Urobilinogen <2.0   Urine RBC 0-2   Urine WBC 5-10 (A)   Squamous Epithelial Cells 3-5 (A)   Hyaline Cast Negative   Urine Bacteria Present (A)     Results for Lady Jay (MRN [id number]):   Ref.  Range 5/23/2018 10:38   Hemoglobin A1c (POC) Latest Units: % 7.8       Assessment/Plan     Insomnia:  - Discuss importance of lifestyle changes for insomnia  - Reviewed with patient that insomnia is common with dementia  - Advised her to use donepezil in morning and not at night  - Handout give on insomnia care and healthy sleeping      DMII, Worsening:  - Discussed with daughter the need to supervise her medication use, given her dementia  - Difficult to know if adjusting does will accomplish anything,consideringm will not take the medication  - Discussed with patient and daughter that patient may benefit from being in a nursing home      Bacteriuria:  - Unclear if she has true UTI  - Will check urine culture  - Follow up as needed if becomes symptomatic      Karey Trevino MD  5/23/2018, 9:52 AM

## 2018-05-23 NOTE — MR AVS SNAPSHOT
[address] [facility] [place] [id number] [facility] [address] [address] 
[phone] Patient: Lennox Churchman MRN: [id number] Visit Information Date & Time Provider Department Dept. Phone Encounter #  
 5/23/2018  9:30 AM Jacklyn Alba, [address] [place] [address] [phone] [id number] Follow-up Instructions Return if symptoms worsen or fail to improve. Your Appointments 8/7/2018  2:00 PM  
Follow Up with MD Bianca Villarreal 23 ([facility]) Appt Note: follow-up 30 min [facility] [address]  
  
   
 [address]  
  
    
 5/7/2019  9:30 AM  
Office Visit with MD Bianca Villarreal 23 [facility]) Appt Note: [address] [facility] [address] [facility] [address]  
  
   
 [address] Upcoming Health Maintenance Date Due Pneumococcal 65+ High/Highest Risk (1 of 2 - PCV13) 12/22/1994 EYE EXAM RETINAL OR DILATED Q1 3/28/2017 LIPID PANEL Q1 3/4/2018 GLAUCOMA SCREENING Q2Y 3/28/2018 Influenza Age 5 to Adult 8/1/2018 HEMOGLOBIN A1C Q6M 8/8/2018 FOOT EXAM Q1 5/7/2019 MICROALBUMIN Q1 5/7/2019 MEDICARE YEARLY EXAM 5/8/2019 DTaP/Tdap/Td series (2 - Td) 6/17/2026 Allergies as of 5/23/2018  Review Complete On: 5/23/2018 By: Annia Sheridan Severity Noted Reaction Type Reactions Codeine  11/10/2015    Other (comments) Felt like a different person, per patient Iodine  11/10/2015    Itching Pcn [Penicillins]  11/10/2015    Itching Sulfa (Sulfonamide Antibiotics)  11/10/2015    Unknown (comments) Vicodin [Hydrocodone-acetaminophen]  11/10/2015    Nausea Only Current Immunizations  Reviewed on 3/26/2018 No immunizations on file. Not reviewed this visit You Were Diagnosed With   
  
 Codes Comments Insomnia, unspecified type    -  Primary ICD-10-CM: G47.00 ICD-9-CM: 780.52 Fatigue, unspecified type     ICD-10-CM: R53.83 ICD-9-CM: 780.79 Bacteriuria     ICD-10-CM: R82.71 ICD-9-CM: 791.9 Glucosuria     ICD-10-CM: R81 
ICD-9-CM: 791.5 Elevated blood sugar     ICD-10-CM: R73.9 ICD-9-CM: 790.29 Type 2 diabetes mellitus without complication, without long-term current use of insulin (HCC)     ICD-10-CM: E11.9 ICD-9-CM: 250.00 Vitals BP Pulse Temp Resp Height(growth percentile) OB Status 131/79 92 97.2 °F (36.2 °C) (Oral) 16 5' 2\" (1.575 m) Hysterectomy Smoking Status Never Smoker Preferred Pharmacy Pharmacy Name Phone [id number]- [address] [phone] Your Updated Medication List  
  
   
This list is accurate as of 5/23/18 10:56 AM.  Always use your most recent med list.  
  
  
  
  
 alcohol swabs Padm Commonly known as:  BD Single Use Swabs Regular Use to clean finger to test blood sugar 1 time daily for DM II E11.9. amLODIPine 5 mg tablet Commonly known as:  Farzad De La Paz Take 5 mg by mouth daily. aspirin delayed-release 81 mg tablet Take 81 mg by mouth daily. atorvastatin 20 mg tablet Commonly known as:  LIPITOR Take 1 Tab by mouth daily. benazepril 40 mg tablet Commonly known as:  LOTENSIN Take 1 Tab by mouth daily. Blood Glucose Control, Low Soln Commonly known as:  TRUE METRIX LEVEL 1 Use as directed to check home blood sugar for DM II E11.9 Blood-Glucose Meter Misc Commonly known as:  TRUE METRIX AIR GLUCOSE METER Use as directed to check home blood sugar for DM II E11.9  
  
 diphenhydrAMINE 50 mg tablet Commonly known as:  BENADRYL Take 1 Tab by mouth nightly as needed for Sleep.  
  
 donepezil 5 mg tablet Commonly known as:  ARICEPT Take 1 Tab by mouth nightly. famotidine 20 mg tablet Commonly known as:  PEPCID Take 1 Tab by mouth daily. FISH OIL 1,000 mg Cap Generic drug:  omega-3 fatty acids-vitamin e Take 1 Cap by mouth daily. One capsule daily. glucose blood VI test strips strip Commonly known as:  TRUE METRIX GLUCOSE TEST STRIP  
TEST BLOOD SUGAR ONE TIME DAILY for DM II E11.9  
  
 lancets 33 gauge Misc Commonly known as:  Marvin Akash Use daily to check blood sugar for DM II E11.9 LORazepam 0.5 mg tablet Commonly known as:  ATIVAN  
TAKE 1 TAB BY MOUTH EVERY NIGHT AT BEDTIME MULTIVITAMINS W/C PO Take 1 Tab by mouth daily. SITagliptin 100 mg tablet Commonly known as:  Awendaw Moreno Take 1 Tab by mouth daily. Take 1 tab by mouth daily. Please discontinue all Jardiance Refills. We Performed the Following AMB POC GLUCOSE BLOOD, BY GLUCOSE MONITORING DEVICE [31870 CPT(R)] AMB POC HEMOGLOBIN A1C [74138 CPT(R)] Follow-up Instructions Return if symptoms worsen or fail to improve. To-Do List   
 05/23/2018 Microbiology:  CULTURE, URINE   
  
 05/23/2018 12:30 PM  
  Appointment with JULIANNA Ardon at [address] [place] [address] Patient Instructions Learning About Sleeping Well What does sleeping well mean? Sleeping well means getting enough sleep. How much sleep is enough varies among people. The number of hours you sleep is not as important as how you feel when you wake up. If you do not feel refreshed, you probably need more sleep. Another sign of not getting enough sleep is feeling tired during the day. The average total nightly sleep time is 7½ to 8 hours. Healthy adults may need a little more or a little less than this. Why is getting enough sleep important? Getting enough quality sleep is a basic part of good health. When your sleep suffers, your mood and your thoughts can suffer too.  You may find yourself feeling more grumpy or stressed. Not getting enough sleep also can lead to serious problems, including injury, accidents, anxiety, and depression. What might cause poor sleeping? Many things can cause sleep problems, including: · Stress. Stress can be caused by fear about a single event, such as giving a speech. Or you may have ongoing stress, such as worry about work or school. · Depression, anxiety, and other mental or emotional conditions. · Changes in your sleep habits or surroundings. This includes changes that happen where you sleep, such as noise, light, or sleeping in a different bed. It also includes changes in your sleep pattern, such as having jet lag or working a late shift. · Health problems, such as pain, breathing problems, and restless legs syndrome. · Lack of regular exercise. How can you help yourself? Here are some tips that may help you sleep more soundly and wake up feeling more refreshed. Your sleeping area · Use your bedroom only for sleeping and sex. A bit of light reading may help you fall asleep. But if it doesn't, do your reading elsewhere in the house. Don't watch TV in bed. · Be sure your bed is big enough to stretch out comfortably, especially if you have a sleep partner. · Keep your bedroom quiet, dark, and cool. Use curtains, blinds, or a sleep mask to block out light. To block out noise, use earplugs, soothing music, or a \"white noise\" machine. Your evening and bedtime routine · Create a relaxing bedtime routine. You might want to take a warm shower or bath, listen to soothing music, or drink a cup of noncaffeinated tea. · Go to bed at the same time every night. And get up at the same time every morning, even if you feel tired. What to avoid · Limit caffeine (coffee, tea, caffeinated sodas) during the day, and don't have any for at least 4 to 6 hours before bedtime. · Don't drink alcohol before bedtime.  Alcohol can cause you to wake up more often during the night. · Don't smoke or use tobacco, especially in the evening. Nicotine can keep you awake. · Don't take naps during the day, especially close to bedtime. · Don't lie in bed awake for too long. If you can't fall asleep, or if you wake up in the middle of the night and can't get back to sleep within 15 minutes or so, get out of bed and go to another room until you feel sleepy. · Don't take medicine right before bed that may keep you awake or make you feel hyper or energized. Your doctor can tell you if your medicine may do this and if you can take it earlier in the day. If you can't sleep · Imagine yourself in a peaceful, pleasant scene. Focus on the details and feelings of being in a place that is relaxing. · Get up and do a quiet or boring activity until you feel sleepy. · Don't drink any liquids after 6 p.m. if you wake up often because you have to go to the bathroom. Where can you learn more? Go to http://coriNook Sleep Systemsrojas.info/. Enter Y997 in the search box to learn more about \"Learning About Sleeping Well. \" Current as of: May 12, 2017 Content Version: 11.4 © 3389-7439 [employer]. Care instructions adapted under license by [employer] (which disclaims liability or warranty for this information). If you have questions about a medical condition or this instruction, always ask your healthcare professional. [place] [address] any warranty or liability for your use of this information. Insomnia: Care Instructions Your Care Instructions Insomnia is the inability to sleep well. It is a common problem for most people at some time. Insomnia may make it hard for you to get to sleep, stay asleep, or sleep as long as you need to. This can make you tired and grouchy during the day. It can also make you forgetful, less effective at work, and unhappy. Insomnia can be caused by conditions such as depression or anxiety.  Pain can also affect your ability to sleep. When these problems are solved, the insomnia usually clears up. But sometimes bad sleep habits can cause insomnia. If insomnia is affecting your work or your enjoyment of life, you can take steps to improve your sleep. Follow-up care is a key part of your treatment and safety. Be sure to make and go to all appointments, and call your doctor if you are having problems. It's also a good idea to know your test results and keep a list of the medicines you take. How can you care for yourself at home? What to avoid · Do not have drinks with caffeine, such as coffee or black tea, for 8 hours before bed. · Do not smoke or use other types of tobacco near bedtime. Nicotine is a stimulant and can keep you awake. · Avoid drinking alcohol late in the evening, because it can cause you to wake in the middle of the night. · Do not eat a big meal close to bedtime. If you are hungry, eat a light snack. · Do not drink a lot of water close to bedtime, because the need to urinate may wake you up during the night. · Do not read or watch TV in bed. Use the bed only for sleeping and sexual activity. What to try · Go to bed at the same time every night, and wake up at the same time every morning. Do not take naps during the day. · Keep your bedroom quiet, dark, and cool. · Sleep on a comfortable pillow and mattress. · If watching the clock makes you anxious, turn it facing away from you so you cannot see the time. · If you worry when you lie down, start a worry book. Well before bedtime, write down your worries, and then set the book and your concerns aside. · Try meditation or other relaxation techniques before you go to bed. · If you cannot fall asleep, get up and go to another room until you feel sleepy. Do something relaxing. Repeat your bedtime routine before you go to bed again. · Make your house quiet and calm about an hour before bedtime.  Turn down the lights, turn off the TV, log off the computer, and turn down the volume on music. This can help you relax after a busy day. When should you call for help? Watch closely for changes in your health, and be sure to contact your doctor if: 
? · Your efforts to improve your sleep do not work. ? · Your insomnia gets worse. ? · You have been feeling down, depressed, or hopeless or have lost interest in things that you usually enjoy. Where can you learn more? Go to http://cori-rojas.info/. Enter P513 in the search box to learn more about \"Insomnia: Care Instructions. \" Current as of: July 26, 2016 Content Version: 11.4 © 5141-1872 [employer]. Care instructions adapted under license by [employer] (which disclaims liability or warranty for this information). If you have questions about a medical condition or this instruction, always ask your healthcare professional. [address] any warranty or liability for your use of this information. Introducing \[facility] [place] [facility]\"" & HEALTH SERVICES! Tico Negron introduces [facility] patient portal. Now you can access parts of your medical record, email your doctor's office, and request medication refills online. 1. In your internet browser, go to [email or web address] 2. Click on the First Time User? Click Here link in the Sign In box. You will see the New Member Sign Up page. 3. Enter your [facility] Access Code exactly as it appears below. You will not need to use this code after youve completed the sign-up process. If you do not sign up before the expiration date, you must request a new code. · [facility] Access Code: [email or web address] Expires: 6/11/2018  5:23 AM 
 
4. Enter the last four digits of your Social Security Number (xxxx) and Date of Birth (mm/dd/yyyy) as indicated and click Submit. You will be taken to the next sign-up page. 5. Create a a[employer] ID. This will be your [employer] login ID and cannot be changed, so think of one that is secure and easy to remember. 6. Create a [employer] password. You can change your password at any time. 7. Enter your Password Reset Question and Answer. This can be used at a later time if you forget your password. 8. Enter your e-mail address. You will receive e-mail notification when new information is available in [address] 9. Click Sign Up. You can now view and download portions of your medical record. 10. Click the Download Summary menu link to download a portable copy of your medical information. If you have questions, please visit the Frequently Asked Questions section of the [employer] website. Remember, [employer] is NOT to be used for urgent needs. For medical emergencies, dial 911. Now available from your iPhone and Android! Please provide this summary of care documentation to your next provider. Your primary care clinician is listed as Sylvain Packer. If you have any questions after today's visit, please call [phone].

## 2018-05-23 NOTE — PROGRESS NOTES
[facility]    CC: F/U of Insomnia    HPI:     - Reports insomnia is unchanged  - Has not been following lifestyle recommendations  - Went to ED on 2018 for her insomnia  - Was given Ativan to help her sleep  - Patient reports that Ativan only helped her sleep in ED, but has not helped her sleep at home  - UA done in ED  - Has not been taking donepezil for her dementia  - Seeing social sevices and senior services  - Apply for medicaid  - Daughter is unsure if mother is actually taking her diabetic medication      ROS: Positive items marked in RED  CON: fever, chills, fatigue  Cardiovascular: palpitations, CP  Resp: cough, SOB  GI: nausea, vomiting, diarrhea  : dysuria, hematuria      Past Medical History:   Diagnosis Date    Diverticulosis     DM type 2 (diabetes mellitus, type 2) ([facility] [address]     Goiter     resolved    Hypercalcemia     nl PTH    Hyperlipidemia     Hypertension 2010    Immunization refused     pt doesn't want any shots       Past Surgical History:   Procedure Laterality Date    HX CATARACT REMOVAL Bilateral     Dr. Yasemin Wallis      [facility]    Patient states Total hysterectomy       Family History   Problem Relation Age of Onset    Cancer Father     Breast Cancer Sister        Social History     Social History    Marital status: UNKNOWN     Spouse name: N/A    Number of children: N/A    Years of education: N/A     Occupational History    retired nurse      Social History Main Topics    Smoking status: Never Smoker    Smokeless tobacco: Never Used    Alcohol use No    Drug use: No    Sexual activity: No      Comment:      Other Topics Concern    None     Social History Narrative       Allergies   Allergen Reactions    Codeine Other (comments)     Felt like a different person, per patient    Iodine Itching    Pcn [Penicillins] Itching    Sulfa (Sulfonamide Antibiotics) Unknown (comments)    Vicodin [Hydrocodone-Acetaminophen] Nausea Only         Current Outpatient Prescriptions:     LORazepam (ATIVAN) 0.5 mg tablet, TAKE 1 TAB BY MOUTH EVERY NIGHT AT BEDTIME, Disp: , Rfl: 0    MULTIVITAMINS W/C PO, Take 1 Tab by mouth daily. , Disp: , Rfl:     Blood-Glucose Meter (TRUE METRIX AIR GLUCOSE METER) [facility], Use as directed to check home blood sugar for DM II E11.9, Disp: 1 Each, Rfl: 0    Blood Glucose Control, Low (TRUE METRIX LEVEL 1) soln, Use as directed to check home blood sugar for DM II E11.9, Disp: 1 Each, Rfl: 12    glucose blood VI test strips (TRUE METRIX GLUCOSE TEST STRIP) strip, TEST BLOOD SUGAR ONE TIME DAILY for DM II E11.9, Disp: 100 Strip, Rfl: 3    lancets (TRUEPLUS LANCETS) 33 gauge misc, Use daily to check blood sugar for DM II E11.9, Disp: 100 Lancet, Rfl: 3    alcohol swabs (BD SINGLE USE SWABS REGULAR) pad, Use to clean finger to test blood sugar 1 time daily for DM II E11.9., Disp: 100 Pad, Rfl: 3    famotidine (PEPCID) 20 mg tablet, Take 1 Tab by mouth daily. , Disp: 90 Tab, Rfl: 1    donepezil (ARICEPT) 5 mg tablet, Take 1 Tab by mouth nightly., Disp: 30 Tab, Rfl: 1    SITagliptin (JANUVIA) 100 mg tablet, Take 1 Tab by mouth daily. Take 1 tab by mouth daily. Please discontinue all Jardiance Refills. , Disp: 30 Tab, Rfl: 3    diphenhydrAMINE (BENADRYL) 50 mg tablet, Take 1 Tab by mouth nightly as needed for Sleep., Disp: 60 Tab, Rfl: 1    amLODIPine (NORVASC) 5 mg tablet, Take 5 mg by mouth daily. , Disp: , Rfl:     atorvastatin (LIPITOR) 20 mg tablet, Take 1 Tab by mouth daily. , Disp: 30 Tab, Rfl: 5    benazepril (LOTENSIN) 40 mg tablet, Take 1 Tab by mouth daily. , Disp: 90 Tab, Rfl: 1    aspirin delayed-release 81 mg tablet, Take 81 mg by mouth daily. , Disp: , Rfl:     omega-3 fatty acids-vitamin e (FISH OIL) 1,000 mg cap, Take 1 Cap by mouth daily. One capsule daily. , Disp: 60 Cap, Rfl: 5    Physical Exam:      /79  Pulse 92 Temp 97.2 °F (36.2 °C) (Oral)   Resp 16  Ht 5' 2\" (1.575 m)    General:  Laying down on bed with eyes closed. Fidgeting a lot. Eyes: sclera clear bilaterally, no discharge noted, eyelids normal in appearance  HENT: NCAT  Lungs: CTAB, Normal respiratory effort and rate  CV: RRR, no MRGs  ABD: soft, non-tender, non-distended, normal bowel sounds  Ext: no peripheral edema or digital cyanosis noted  Skin: normal temperature, turgor, color, and texture  Neuro: Speech normal, Moving all extremities    URINE CULTURE & SENSITIVITY (5/20/2018): [address]  Component Name Value Ref Range   URINE CULTURE Mixed Culture     Specimen   Urine - Clean Catch Urine   Result Narrative   >100,000 col/mL, more than 2 different organisms.  Culture appears contaminated with skin mayra. U/A (5/20/2018):  Urine pH 6.0   Urine Protein Screen Negative   Urine Glucose 50* (A)   Urine Ketones Negative   Urine Occult Blood Negative   Urine Specific Gravity 1.010   Urine Nitrite Negative   Urine Leukocyte Esterase SMALL (A)   Urine Bilirubin Negative   Urine Urobilinogen <2.0   Urine RBC 0-2   Urine WBC 5-10 (A)   Squamous Epithelial Cells 3-5 (A)   Hyaline Cast Negative   Urine Bacteria Present (A)     Results for Albert Restrepo (MRN [id number]):   Ref.  Range 5/23/2018 10:38   Hemoglobin A1c (POC) Latest Units: % 7.8       Assessment/Plan     Insomnia:  - Discuss importance of lifestyle changes for insomnia  - Reviewed with patient that insomnia is common with dementia  - Advised her to use donepezil in morning and not at night  - Handout give on insomnia care and healthy sleeping      DMII, Worsening:  - Discussed with daughter the need to supervise her medication use, given her dementia  - Difficult to know if adjusting does will accomplish anything,consideringm will not take the medication  - Discussed with patient and daughter that patient may benefit from being in a nursing home      Bacteriuria:  - Unclear if she has true UTI  - Will check urine culture  - Follow up as needed if becomes symptomatic      Liana Dancer, MD  5/23/2018, 9:52 AM

## 2018-05-23 NOTE — PROGRESS NOTES
1. Have you been to the ER, urgent care clinic since your last visit? Hospitalized since your last visit? Yes, went to [facility] on 5/20/2018 for Insomnia. 2. Have you seen or consulted any other health care providers outside of the [address] [place] [address] since your last visit? Include any pap smears or colon screening. No.    Chief Complaint   Patient presents with    ED Follow-up     went to [facility] [place] on 5/20/2018     Insomnia   She was prescribed lorazepam 0.5mg from [facility]. She has been taking it but still having problems sleeping.

## 2018-05-23 NOTE — PATIENT INSTRUCTIONS
Learning About Sleeping Well  What does sleeping well mean? Sleeping well means getting enough sleep. How much sleep is enough varies among people. The number of hours you sleep is not as important as how you feel when you wake up. If you do not feel refreshed, you probably need more sleep. Another sign of not getting enough sleep is feeling tired during the day. The average total nightly sleep time is 7½ to 8 hours. Healthy adults may need a little more or a little less than this. Why is getting enough sleep important? Getting enough quality sleep is a basic part of good health. When your sleep suffers, your mood and your thoughts can suffer too. You may find yourself feeling more grumpy or stressed. Not getting enough sleep also can lead to serious problems, including injury, accidents, anxiety, and depression. What might cause poor sleeping? Many things can cause sleep problems, including:  · Stress. Stress can be caused by fear about a single event, such as giving a speech. Or you may have ongoing stress, such as worry about work or school. · Depression, anxiety, and other mental or emotional conditions. · Changes in your sleep habits or surroundings. This includes changes that happen where you sleep, such as noise, light, or sleeping in a different bed. It also includes changes in your sleep pattern, such as having jet lag or working a late shift. · Health problems, such as pain, breathing problems, and restless legs syndrome. · Lack of regular exercise. How can you help yourself? Here are some tips that may help you sleep more soundly and wake up feeling more refreshed. Your sleeping area  · Use your bedroom only for sleeping and sex. A bit of light reading may help you fall asleep. But if it doesn't, do your reading elsewhere in the house. Don't watch TV in bed. · Be sure your bed is big enough to stretch out comfortably, especially if you have a sleep partner.   · Keep your bedroom quiet, dark, and cool. Use curtains, blinds, or a sleep mask to block out light. To block out noise, use earplugs, soothing music, or a \"white noise\" machine. Your evening and bedtime routine  · Create a relaxing bedtime routine. You might want to take a warm shower or bath, listen to soothing music, or drink a cup of noncaffeinated tea. · Go to bed at the same time every night. And get up at the same time every morning, even if you feel tired. What to avoid  · Limit caffeine (coffee, tea, caffeinated sodas) during the day, and don't have any for at least 4 to 6 hours before bedtime. · Don't drink alcohol before bedtime. Alcohol can cause you to wake up more often during the night. · Don't smoke or use tobacco, especially in the evening. Nicotine can keep you awake. · Don't take naps during the day, especially close to bedtime. · Don't lie in bed awake for too long. If you can't fall asleep, or if you wake up in the middle of the night and can't get back to sleep within 15 minutes or so, get out of bed and go to another room until you feel sleepy. · Don't take medicine right before bed that may keep you awake or make you feel hyper or energized. Your doctor can tell you if your medicine may do this and if you can take it earlier in the day. If you can't sleep  · Imagine yourself in a peaceful, pleasant scene. Focus on the details and feelings of being in a place that is relaxing. · Get up and do a quiet or boring activity until you feel sleepy. · Don't drink any liquids after 6 p.m. if you wake up often because you have to go to the bathroom. Where can you learn more? Go to http://cori-rojas.info/. Enter E301 in the search box to learn more about \"Learning About Sleeping Well. \"  Current as of: May 12, 2017  Content Version: 11.4  © 9419-6695 Healthwise, [employer].  Care instructions adapted under license by [employer] (which disclaims liability or warranty for this information). If you have questions about a medical condition or this instruction, always ask your healthcare professional. [address] any warranty or liability for your use of this information. Insomnia: Care Instructions  Your Care Instructions    Insomnia is the inability to sleep well. It is a common problem for most people at some time. Insomnia may make it hard for you to get to sleep, stay asleep, or sleep as long as you need to. This can make you tired and grouchy during the day. It can also make you forgetful, less effective at work, and unhappy. Insomnia can be caused by conditions such as depression or anxiety. Pain can also affect your ability to sleep. When these problems are solved, the insomnia usually clears up. But sometimes bad sleep habits can cause insomnia. If insomnia is affecting your work or your enjoyment of life, you can take steps to improve your sleep. Follow-up care is a key part of your treatment and safety. Be sure to make and go to all appointments, and call your doctor if you are having problems. It's also a good idea to know your test results and keep a list of the medicines you take. How can you care for yourself at home? What to avoid  · Do not have drinks with caffeine, such as coffee or black tea, for 8 hours before bed. · Do not smoke or use other types of tobacco near bedtime. Nicotine is a stimulant and can keep you awake. · Avoid drinking alcohol late in the evening, because it can cause you to wake in the middle of the night. · Do not eat a big meal close to bedtime. If you are hungry, eat a light snack. · Do not drink a lot of water close to bedtime, because the need to urinate may wake you up during the night. · Do not read or watch TV in bed. Use the bed only for sleeping and sexual activity. What to try  · Go to bed at the same time every night, and wake up at the same time every morning. Do not take naps during the day.   · Keep your bedroom quiet, dark, and cool. · Sleep on a comfortable pillow and mattress. · If watching the clock makes you anxious, turn it facing away from you so you cannot see the time. · If you worry when you lie down, start a worry book. Well before bedtime, write down your worries, and then set the book and your concerns aside. · Try meditation or other relaxation techniques before you go to bed. · If you cannot fall asleep, get up and go to another room until you feel sleepy. Do something relaxing. Repeat your bedtime routine before you go to bed again. · Make your house quiet and calm about an hour before bedtime. Turn down the lights, turn off the TV, log off the computer, and turn down the volume on music. This can help you relax after a busy day. When should you call for help? Watch closely for changes in your health, and be sure to contact your doctor if:  ? · Your efforts to improve your sleep do not work. ? · Your insomnia gets worse. ? · You have been feeling down, depressed, or hopeless or have lost interest in things that you usually enjoy. Where can you learn more? Go to http://cori-rojas.info/. Enter P513 in the search box to learn more about \"Insomnia: Care Instructions. \"  Current as of: July 26, 2016  Content Version: 11.4  © 4383-5016 Healthwise[employer]. Care instructions adapted under license by [employer] (which disclaims liability or warranty for this information). If you have questions about a medical condition or this instruction, always ask your healthcare professional. [place] [address] any warranty or liability for your use of this information.

## 2018-05-24 VITALS
HEART RATE: 88 BPM | TEMPERATURE: 98.5 F | SYSTOLIC BLOOD PRESSURE: 149 MMHG | OXYGEN SATURATION: 98 % | DIASTOLIC BLOOD PRESSURE: 76 MMHG

## 2018-05-24 PROCEDURE — 3331090002 HH PPS REVENUE DEBIT

## 2018-05-24 PROCEDURE — 3331090001 HH PPS REVENUE CREDIT

## 2018-05-25 DIAGNOSIS — N39.0 URINARY TRACT INFECTION WITHOUT HEMATURIA, SITE UNSPECIFIED: Primary | ICD-10-CM

## 2018-05-25 LAB — BACTERIA UR CULT: NORMAL

## 2018-05-25 PROCEDURE — 3331090001 HH PPS REVENUE CREDIT

## 2018-05-25 PROCEDURE — 3331090002 HH PPS REVENUE DEBIT

## 2018-05-25 RX ORDER — NITROFURANTOIN 25; 75 MG/1; MG/1
100 CAPSULE ORAL 2 TIMES DAILY
Qty: 10 CAP | Refills: 0 | Status: SHIPPED | OUTPATIENT
Start: 2018-05-25 | End: 2018-05-30

## 2018-05-25 NOTE — PROGRESS NOTES
Patient notified of results. Will treat empirically given her symptoms. Antibiotic sent to pharmacy. Advised to avoid using adult diapers and get bedside commode. Instructed to make appointment with lab for repeat urine culture in one week.

## 2018-05-26 PROCEDURE — 3331090001 HH PPS REVENUE CREDIT

## 2018-05-26 PROCEDURE — 3331090002 HH PPS REVENUE DEBIT

## 2018-05-27 PROCEDURE — 3331090001 HH PPS REVENUE CREDIT

## 2018-05-27 PROCEDURE — 3331090002 HH PPS REVENUE DEBIT

## 2018-05-28 PROCEDURE — 3331090001 HH PPS REVENUE CREDIT

## 2018-05-28 PROCEDURE — 3331090002 HH PPS REVENUE DEBIT

## 2018-05-29 PROCEDURE — 3331090002 HH PPS REVENUE DEBIT

## 2018-05-29 PROCEDURE — 3331090001 HH PPS REVENUE CREDIT

## 2018-06-08 ENCOUNTER — OFFICE VISIT (OUTPATIENT)
Dept: FAMILY MEDICINE CLINIC | Age: 83
End: 2018-06-08

## 2018-06-08 VITALS
HEART RATE: 85 BPM | DIASTOLIC BLOOD PRESSURE: 73 MMHG | RESPIRATION RATE: 16 BRPM | HEIGHT: 62 IN | BODY MASS INDEX: 21.9 KG/M2 | SYSTOLIC BLOOD PRESSURE: 125 MMHG | TEMPERATURE: 98.7 F | WEIGHT: 119 LBS

## 2018-06-08 DIAGNOSIS — R35.0 URINE FREQUENCY: Primary | ICD-10-CM

## 2018-06-08 DIAGNOSIS — E11.9 TYPE 2 DIABETES MELLITUS WITHOUT COMPLICATION, WITHOUT LONG-TERM CURRENT USE OF INSULIN (HCC): ICD-10-CM

## 2018-06-08 LAB
BILIRUB UR QL STRIP: NEGATIVE
GLUCOSE UR-MCNC: NORMAL MG/DL
KETONES P FAST UR STRIP-MCNC: NORMAL MG/DL
PH UR STRIP: 5.5 [PH] (ref 4.6–8)
PROT UR QL STRIP: NEGATIVE
SP GR UR STRIP: 1.01 (ref 1–1.03)
UA UROBILINOGEN AMB POC: NORMAL (ref 0.2–1)
URINALYSIS CLARITY POC: CLEAR
URINALYSIS COLOR POC: YELLOW
URINE BLOOD POC: NEGATIVE
URINE LEUKOCYTES POC: NEGATIVE
URINE NITRITES POC: NEGATIVE

## 2018-06-08 RX ORDER — METFORMIN HYDROCHLORIDE 500 MG/1
1000 TABLET, EXTENDED RELEASE ORAL
Qty: 60 TAB | Refills: 1 | Status: SHIPPED | OUTPATIENT
Start: 2018-06-08 | End: 2018-07-18 | Stop reason: ALTCHOICE

## 2018-06-08 NOTE — PROGRESS NOTES
1. Have you been to the ER, urgent care clinic since your last visit? Hospitalized since your last visit? No.     2. Have you seen or consulted any other health care providers outside of the [facility] since your last visit? Include any pap smears or colon screening. Yes, saw Podiatrist on 5/31/2018.     Chief Complaint   Patient presents with    Follow Up Chronic Condition    Bladder Infection    Urinary Frequency

## 2018-06-08 NOTE — PATIENT INSTRUCTIONS
Learning About Diabetes Food Guidelines  Your Care Instructions    Meal planning is important to manage diabetes. It helps keep your blood sugar at a target level (which you set with your doctor). You don't have to eat special foods. You can eat what your family eats, including sweets once in a while. But you do have to pay attention to how often you eat and how much you eat of certain foods. You may want to work with a dietitian or a certified diabetes educator (CDE) to help you plan meals and snacks. A dietitian or CDE can also help you lose weight if that is one of your goals. What should you know about eating carbs? Managing the amount of carbohydrate (carbs) you eat is an important part of healthy meals when you have diabetes. Carbohydrate is found in many foods. · Learn which foods have carbs. And learn the amounts of carbs in different foods. ¨ Bread, cereal, pasta, and rice have about 15 grams of carbs in a serving. A serving is 1 slice of bread (1 ounce), ½ cup of cooked cereal, or 1/3 cup of cooked pasta or rice. ¨ Fruits have 15 grams of carbs in a serving. A serving is 1 small fresh fruit, such as an apple or orange; ½ of a banana; ½ cup of cooked or canned fruit; ½ cup of fruit juice; 1 cup of melon or raspberries; or 2 tablespoons of dried fruit. ¨ Milk and no-sugar-added yogurt have 15 grams of carbs in a serving. A serving is 1 cup of milk or 2/3 cup of no-sugar-added yogurt. ¨ Starchy vegetables have 15 grams of carbs in a serving. A serving is ½ cup of mashed potatoes or sweet potato; 1 cup winter squash; ½ of a small baked potato; ½ cup of cooked beans; or ½ cup cooked corn or green peas. · Learn how much carbs to eat each day and at each meal. A dietitian or CDE can teach you how to keep track of the amount of carbs you eat. This is called carbohydrate counting. · If you are not sure how to count carbohydrate grams, use the Plate Method to plan meals.  It is a good, quick way to make sure that you have a balanced meal. It also helps you spread carbs throughout the day. ¨ Divide your plate by types of foods. Put non-starchy vegetables on half the plate, meat or other protein food on one-quarter of the plate, and a grain or starchy vegetable in the final quarter of the plate. To this you can add a small piece of fruit and 1 cup of milk or yogurt, depending on how many carbs you are supposed to eat at a meal.  · Try to eat about the same amount of carbs at each meal. Do not \"save up\" your daily allowance of carbs to eat at one meal.  · Proteins have very little or no carbs per serving. Examples of proteins are beef, chicken, turkey, fish, eggs, tofu, cheese, cottage cheese, and peanut butter. A serving size of meat is 3 ounces, which is about the size of a deck of cards. Examples of meat substitute serving sizes (equal to 1 ounce of meat) are 1/4 cup of cottage cheese, 1 egg, 1 tablespoon of peanut butter, and ½ cup of tofu. How can you eat out and still eat healthy? · Learn to estimate the serving sizes of foods that have carbohydrate. If you measure food at home, it will be easier to estimate the amount in a serving of restaurant food. · If the meal you order has too much carbohydrate (such as potatoes, corn, or baked beans), ask to have a low-carbohydrate food instead. Ask for a salad or green vegetables. · If you use insulin, check your blood sugar before and after eating out to help you plan how much to eat in the future. · If you eat more carbohydrate at a meal than you had planned, take a walk or do other exercise. This will help lower your blood sugar. What else should you know? · Limit saturated fat, such as the fat from meat and dairy products. This is a healthy choice because people who have diabetes are at higher risk of heart disease. So choose lean cuts of meat and nonfat or low-fat dairy products.  Use olive or canola oil instead of butter or shortening when cooking. · Don't skip meals. Your blood sugar may drop too low if you skip meals and take insulin or certain medicines for diabetes. · Check with your doctor before you drink alcohol. Alcohol can cause your blood sugar to drop too low. Alcohol can also cause a bad reaction if you take certain diabetes medicines. Follow-up care is a key part of your treatment and safety. Be sure to make and go to all appointments, and call your doctor if you are having problems. It's also a good idea to know your test results and keep a list of the medicines you take. Where can you learn more? Go to http://cori-rojas.info/. Enter Z900 in the search box to learn more about \"Learning About Diabetes Food Guidelines. \"  Current as of: March 13, 2017  Content Version: 11.4  © 8760-5934 [employer]. Care instructions adapted under license by [employer] (which disclaims liability or warranty for this information). If you have questions about a medical condition or this instruction, always ask your healthcare professional. [address] any warranty or liability for your use of this information. Learning About Meal Planning for Diabetes  Why plan your meals? Meal planning can be a key part of managing diabetes. Planning meals and snacks with the right balance of carbohydrate, protein, and fat can help you keep your blood sugar at the target level you set with your doctor. You don't have to eat special foods. You can eat what your family eats, including sweets once in a while. But you do have to pay attention to how often you eat and how much you eat of certain foods. You may want to work with a dietitian or a certified diabetes educator. He or she can give you tips and meal ideas and can answer your questions about meal planning. This health professional can also help you reach a healthy weight if that is one of your goals. What plan is right for you?   Your dietitian or diabetes educator may suggest that you start with the plate format or carbohydrate counting. The plate format  The plate format is a simple way to help you manage how you eat. You plan meals by learning how much space each food should take on a plate. Using the plate format helps you spread carbohydrate throughout the day. It can make it easier to keep your blood sugar level within your target range. It also helps you see if you're eating healthy portion sizes. To use the plate format, you put non-starchy vegetables on half your plate. Add meat or meat substitutes on one-quarter of the plate. Put a grain or starchy vegetable (such as brown rice or a potato) on the final quarter of the plate. You can add a small piece of fruit and some low-fat or fat-free milk or yogurt, depending on your carbohydrate goal for each meal.  Here are some tips for using the plate format:  · Make sure that you are not using an oversized plate. A 9-inch plate is best. Many restaurants use larger plates. · Get used to using the plate format at home. Then you can use it when you eat out. · Write down your questions about using the plate format. Talk to your doctor, a dietitian, or a diabetes educator about your concerns. Carbohydrate counting  With carbohydrate counting, you plan meals based on the amount of carbohydrate in each food. Carbohydrate raises blood sugar higher and more quickly than any other nutrient. It is found in desserts, breads and cereals, and fruit. It's also found in starchy vegetables such as potatoes and corn, grains such as rice and pasta, and milk and yogurt. Spreading carbohydrate throughout the day helps keep your blood sugar levels within your target range. Your daily amount depends on several things, including your weight, how active you are, which diabetes medicines you take, and what your goals are for your blood sugar levels.  A registered dietitian or diabetes educator can help you plan how much carbohydrate to include in each meal and snack. A guideline for your daily amount of carbohydrate is:  · 45 to 60 grams at each meal. That's about the same as 3 to 4 carbohydrate servings. · 15 to 20 grams at each snack. That's about the same as 1 carbohydrate serving. The Nutrition Facts label on packaged foods tells you how much carbohydrate is in a serving of the food. First, look at the serving size on the food label. Is that the amount you eat in a serving? All of the nutrition information on a food label is based on that serving size. So if you eat more or less than that, you'll need to adjust the other numbers. Total carbohydrate is the next thing you need to look for on the label. If you count carbohydrate servings, one serving of carbohydrate is 15 grams. For foods that don't come with labels, such as fresh fruits and vegetables, you'll need a guide that lists carbohydrate in these foods. Ask your doctor, dietitian, or diabetes educator about books or other nutrition guides you can use. If you take insulin, you need to know how many grams of carbohydrate are in a meal. This lets you know how much rapid-acting insulin to take before you eat. If you use an insulin pump, you get a constant rate of insulin during the day. So the pump must be programmed at meals to give you extra insulin to cover the rise in blood sugar after meals. When you know how much carbohydrate you will eat, you can take the right amount of insulin. Or, if you always use the same amount of insulin, you need to make sure that you eat the same amount of carbohydrate at meals. If you need more help to understand carbohydrate counting and food labels, ask your doctor, dietitian, or diabetes educator. How do you get started with meal planning? Here are some tips to get started:  · Plan your meals a week at a time. Don't forget to include snacks too. · Use cookbooks or online recipes to plan several main meals.  Plan some quick meals for busy nights. You also can double some recipes that freeze well. Then you can save half for other busy nights when you don't have time to cook. · Make sure you have the ingredients you need for your recipes. If you're running low on basic items, put these items on your shopping list too. · List foods that you use to make breakfasts, lunches, and snacks. List plenty of fruits and vegetables. · Post this list on the refrigerator. Add to it as you think of more things you need. · Take the list to the store to do your weekly shopping. Follow-up care is a key part of your treatment and safety. Be sure to make and go to all appointments, and call your doctor if you are having problems. It's also a good idea to know your test results and keep a list of the medicines you take. Where can you learn more? Go to http://coriBlade Games Worldrojas.info/. Hayley Narayan in the search box to learn more about \"Learning About Meal Planning for Diabetes. \"  Current as of: March 13, 2017  Content Version: 11.4  © 0008-4620 [employer]. Care instructions adapted under license by [employer] (which disclaims liability or warranty for this information). If you have questions about a medical condition or this instruction, always ask your healthcare professional. [address] any warranty or liability for your use of this information. Nutrition Tips for Diabetes: After Your Visit  Your Care Instructions  A healthy diet is important to manage diabetes. It helps you lose weight (if you need to) and keep it off. It gives you the nutrition and energy your body needs and helps prevent heart disease. But a diet for diabetes does not mean that you have to eat special foods. You can eat what your family eats, including occasional sweets and other favorites. But you do have to pay attention to how often you eat and how much you eat of certain foods.  The right plan for you will give you meals that help you keep your blood sugar at healthy levels. Try to eat a variety of foods and to spread carbohydrate throughout the day. Carbohydrate raises blood sugar higher and more quickly than any other nutrient does. Carbohydrate is found in sugar, breads and cereals, fruit, starchy vegetables such as potatoes and corn, and milk and yogurt. You may want to work with a dietitian or diabetes educator to help you plan meals and snacks. A dietitian or diabetes educator also can help you lose weight if that is one of your goals. The following tips can help you enjoy your meals and stay healthy. Follow-up care is a key part of your treatment and safety. Be sure to make and go to all appointments, and call your doctor if you are having problems. Its also a good idea to know your test results and keep a list of the medicines you take. How can you care for yourself at home? · Learn which foods have carbohydrate and how much carbohydrate to eat. A dietitian or diabetes educator can help you learn to keep track of how much carbohydrate you eat. · Spread carbohydrate throughout the day. Eat some carbohydrate at all meals, but do not eat too much at any one time. · Plan meals to include food from all the food groups. These are the food groups and some example portion sizes:  ¨ Grains: 1 slice of bread (1 ounce), ½ cup of cooked cereal, and 1/3 cup of cooked pasta or rice. These have about 15 grams of carbohydrate in a serving. Choose whole grains such as whole wheat bread or crackers, oatmeal, and brown rice more often than refined grains. ¨ Fruit: 1 small fresh fruit, such as an apple or orange; ½ of a banana; ½ cup of chopped, cooked, or canned fruit; ½ cup of fruit juice; 1 cup of melon or raspberries; and 2 tablespoons of dried fruit. These have about 15 grams of carbohydrate in a serving. ¨ Dairy: 1 cup of nonfat or low-fat milk and 2/3 cup of plain yogurt.  These have about 15 grams of carbohydrate in a serving. ¨ Protein foods: Beef, chicken, turkey, fish, eggs, tofu, cheese, cottage cheese, and peanut butter. A serving size of meat is 3 ounces, which is about the size of a deck of cards. Examples of meat substitute serving sizes (equal to 1 ounce of meat) are 1/4 cup of cottage cheese, 1 egg, 1 tablespoon of peanut butter, and ½ cup of tofu. These have very little or no carbohydrate per serving. ¨ Vegetables: Starchy vegetables such as ½ cup of cooked dried beans, peas, potatoes, or corn have about 15 grams of carbohydrate. Nonstarchy vegetables have very little carbohydrate, such as 1 cup of raw leafy vegetables (such as spinach), ½ cup of other vegetables (cooked or chopped), and 3/4 cup of vegetable juice. · Use the plate format to plan meals. It is a good, quick way to make sure that you have a balanced meal. It also helps you spread carbohydrate throughout the day. You divide your plate by types of foods. Put vegetables on half the plate, meat or meat substitutes on one-quarter of the plate, and a grain or starchy vegetable (such as brown rice or a potato) in the final quarter of the plate. To this you can add a small piece of fruit and 1 cup of milk or yogurt, depending on how much carbohydrate you are supposed to eat at a meal.  · Talk to your dietitian or diabetes educator about ways to add limited amounts of sweets into your meal plan. You can eat these foods now and then, as long as you include the amount of carbohydrate they have in your daily carbohydrate allowance. · If you drink alcohol, limit it to no more than 1 drink a day for women and 2 drinks a day for men. If you are pregnant, no amount of alcohol is known to be safe. · Protein, fat, and fiber do not raise blood sugar as much as carbohydrate does. If you eat a lot of these nutrients in a meal, your blood sugar will rise more slowly than it would otherwise. · Limit saturated fats, such as those from meat and dairy products.  Try to replace it with monounsaturated fat, such as olive oil. This is a healthier choice because people who have diabetes are at higher-than-average risk of heart disease. But use a modest amount of olive oil. A tablespoon of olive oil has 14 grams of fat and 120 calories. · Exercise lowers blood sugar. If you take insulin by shots or pump, you can use less than you would if you were not exercising. Keep in mind that timing matters. If you exercise within 1 hour after a meal, your body may need less insulin for that meal than it would if you exercised 3 hours after the meal. Test your blood sugar to find out how exercise affects your need for insulin. · Exercise on most days of the week. Aim for at least 30 minutes. Exercise helps you stay at a healthy weight and helps your body use insulin. Walking is an easy way to get exercise. Gradually increase the amount you walk every day. You also may want to swim, bike, or do other activities. When you eat out  · Learn to estimate the serving sizes of foods that have carbohydrate. If you measure food at home, it will be easier to estimate the amount in a serving of restaurant food. · If the meal you order has too much carbohydrate (such as potatoes, corn, or baked beans), ask to have a low-carbohydrate food instead. Ask for a salad or green vegetables. · If you use insulin, check your blood sugar before and after eating out to help you plan how much to eat in the future. · If you eat more carbohydrate at a meal than you had planned, take a walk or do other exercise. This will help lower your blood sugar. Where can you learn more? Go to ArmorText.be  Enter I383 in the search box to learn more about \"Nutrition Tips for Diabetes: After Your Visit. \"   © 6422-3855 [employer]. Care instructions adapted under license by Ricki Easton (which disclaims liability or warranty for this information).  This care instruction is for use with your licensed healthcare professional. If you have questions about a medical condition or this instruction, always ask your healthcare professional. [place] [address] any warranty or liability for your use of this information.   Content Version: 68.8.893298; Current as of: June 4, 2014

## 2018-06-08 NOTE — MR AVS SNAPSHOT
Dru Rodríguez [place] [address] 68 [facility] Adriano. [address] [address] 
[phone] Patient: Lurdes Aviles MRN: [id number] Visit Information Date & Time Provider Department Dept. Phone Encounter #  
 6/8/2018 10:45 AM Luc Barba, [address] [place] [address] [phone] [id number] Follow-up Instructions Return in about 1 month (around 7/8/2018) for Diabetes follow up. Your Appointments 8/7/2018  2:00 PM  
Follow Up with MD Bianca Partida 23 [facility] Appt Note: follow-up 30 min [facility] Adriano. [address]  
  
   
 [address]  
  
    
 5/7/2019  9:30 AM  
Office Visit with MD Bianca Partida 23 [facility]) Appt Note: [address] [place] [address] 68 [facility] Adriano. [address]  
  
   
 [address] Upcoming Health Maintenance Date Due Pneumococcal 65+ High/Highest Risk (1 of 2 - PCV13) 12/22/1994 EYE EXAM RETINAL OR DILATED Q1 3/28/2017 LIPID PANEL Q1 3/4/2018 GLAUCOMA SCREENING Q2Y 3/28/2018 Influenza Age 5 to Adult 8/1/2018 HEMOGLOBIN A1C Q6M 11/23/2018 FOOT EXAM Q1 5/7/2019 MICROALBUMIN Q1 5/7/2019 MEDICARE YEARLY EXAM 5/8/2019 DTaP/Tdap/Td series (2 - Td) 6/17/2026 Allergies as of 6/8/2018  Review Complete On: 6/8/2018 By: Medardo Finch Severity Noted Reaction Type Reactions Codeine  11/10/2015    Other (comments) Felt like a different person, per patient Iodine  11/10/2015    Itching Pcn [Penicillins]  11/10/2015    Itching Sulfa (Sulfonamide Antibiotics)  11/10/2015    Unknown (comments) Vicodin [Hydrocodone-acetaminophen]  11/10/2015    Nausea Only Current Immunizations  Reviewed on 3/26/2018 No immunizations on file. Not reviewed this visit You Were Diagnosed With   
  
 Codes Comments Urine frequency    -  Primary ICD-10-CM: R35.0 ICD-9-CM: 788.41 Type 2 diabetes mellitus without complication, without long-term current use of insulin (HCC)     ICD-10-CM: E11.9 ICD-9-CM: 250.00 Vitals BP Pulse Temp Resp Height(growth percentile) Weight(growth percentile) 125/73 85 98.7 °F (37.1 °C) (Oral) 16 5' 2\" (1.575 m) 119 lb (54 kg) BMI OB Status Smoking Status 21.77 kg/m2 Hysterectomy Never Smoker BMI and BSA Data Body Mass Index Body Surface Area 21.77 kg/m 2 1.54 m 2 Preferred Pharmacy Pharmacy Name Phone [facility] #[id number] [phone] [phone] Your Updated Medication List  
  
   
This list is accurate as of 6/8/18 11:07 AM.  Always use your most recent med list.  
  
  
  
  
 alcohol swabs Padm Commonly known as:  BD Single Use Swabs Regular Use to clean finger to test blood sugar 1 time daily for DM II E11.9. amLODIPine 5 mg tablet Commonly known as:  Ashlee Chimes Take 5 mg by mouth daily. aspirin delayed-release 81 mg tablet Take 81 mg by mouth daily. atorvastatin 20 mg tablet Commonly known as:  LIPITOR Take 1 Tab by mouth daily. benazepril 40 mg tablet Commonly known as:  LOTENSIN Take 1 Tab by mouth daily. Blood Glucose Control, Low Soln Commonly known as:  TRUE METRIX LEVEL 1 Use as directed to check home blood sugar for DM II E11.9 Blood-Glucose Meter Misc Commonly known as:  TRUE METRIX AIR GLUCOSE METER Use as directed to check home blood sugar for DM II E11.9  
  
 diphenhydrAMINE 50 mg tablet Commonly known as:  BENADRYL Take 1 Tab by mouth nightly as needed for Sleep.  
  
 donepezil 5 mg tablet Commonly known as:  ARICEPT Take 1 Tab by mouth nightly. famotidine 20 mg tablet Commonly known as:  PEPCID Take 1 Tab by mouth daily. FISH OIL 1,000 mg Cap Generic drug:  omega-3 fatty acids-vitamin e Take 1 Cap by mouth daily. One capsule daily. glucose blood VI test strips strip Commonly known as:  TRUE METRIX GLUCOSE TEST STRIP  
TEST BLOOD SUGAR ONE TIME DAILY for DM II E11.9  
  
 lancets 33 gauge Misc Commonly known as:  Michael Meiers Use daily to check blood sugar for DM II E11.9 LORazepam 0.5 mg tablet Commonly known as:  ATIVAN  
TAKE 1 TAB BY MOUTH EVERY NIGHT AT BEDTIME  
  
 metFORMIN  mg tablet Commonly known as:  GLUCOPHAGE XR Take 2 Tabs by mouth daily (with dinner). MULTIVITAMINS W/C PO Take 1 Tab by mouth daily. SITagliptin 100 mg tablet Commonly known as:  Franklin Aram Take 1 Tab by mouth daily. Take 1 tab by mouth daily. Please discontinue all Jardiance Refills. Prescriptions Sent to Pharmacy Refills  
 metFORMIN ER (GLUCOPHAGE XR) 500 mg tablet 1 Sig: Take 2 Tabs by mouth daily (with dinner). Class: Normal  
 Pharmacy: [address] [place] [address] [facility] Ph #: [phone] Route: Oral  
  
We Performed the Following AMB POC URINALYSIS DIP STICK AUTO W/O MICRO [34513 CPT(R)] REFERRAL TO DIETITIAN [JSK61 Custom] Comments:  
 Diabetes diet/nutrition counseling Follow-up Instructions Return in about 1 month (around 7/8/2018) for Diabetes follow up. To-Do List   
 07/25/2018 8:30 PM  
  Appointment with [facility] 2 at [place] [address] [phone] [address] [facility]:      [place] [facility] [phone]: [address]bra Valera [address], 4th floor, [facility] [address] [phone]; [address] [place], [place] [address]vd, [place], [address]  Patient instructions ·  Please do not arrive prior to your scheduled appointment time as your room may not be ready. ·  Avoid afternoon naps, caffeine and alcoholic beverages the day of your study.  · Please bring david men bottoms, women tops and bottoms. We   ask that you do not bring a one piece nightgown to sleep in. ·  Please do not apply lotion after shower the day of your appointment  ·  Please do not apply leave in hair products, such as, oils, conditioners or hairspray. ·  Remove any hairpieces, such as, extensions, weaves & sewn in wigs prior to your appointment. If you arrive with sewn in hairpieces, we will   reschedule your procedure. The [facility] are outpatient testing department. ·  We encourage you to bring a non-alcoholic/ non-caffeinated beverage and snack, if desired. The cafeteria is closed at night. ·  Please bring any medications that are routinely taken prior to bed. If you have been given a sedative for the study,  DO NOT TAKE THE SEDATIVE BEFORE ARRIVAL. Be advised that if the sedative is taken, we recommend that you not drive for 10 hours after taking it. ·  Diabetic patients should bring testing device, snack and any medications that may be needed. ·  Patients who require breathing treatments should bring the unit with them. ·  The person having the sleep study is the only person allowed in the testing room. If another individual needs to be present throughout the night to assist in the patients care, arrangements must be made prior to the scheduled study date. ·  During the study, we encourage a time free environment. Please refrain from checking the time. ·  The technologist will ask you to turn off your cell phone. ·  Televisions are available in each room but cannot remain on during the study; it interferes with monitoring equipment. ·  During the study, the technologist will ask you to sleep on your back for a portion of the night. ·  Showers are available following your sleep study, please bring any toiletry items. We will provide washcloths and towels. Thank you for choosing the [address]   If you have any questions prior to your appointment, please do not hesitate to contact us at [phone]. Referral Information Referral ID Referred By Referred To  
  
 [id number] Niidayo Bhupinder MACO Not Available Visits Status Start Date End Date 1 New Request 6/8/18 6/8/19 If your referral has a status of pending review or denied, additional information will be sent to support the outcome of this decision. Patient Instructions Learning About Diabetes Food Guidelines Your Care Instructions Meal planning is important to manage diabetes. It helps keep your blood sugar at a target level (which you set with your doctor). You don't have to eat special foods. You can eat what your family eats, including sweets once in a while. But you do have to pay attention to how often you eat and how much you eat of certain foods. You may want to work with a dietitian or a certified diabetes educator (CDE) to help you plan meals and snacks. A dietitian or CDE can also help you lose weight if that is one of your goals. What should you know about eating carbs? Managing the amount of carbohydrate (carbs) you eat is an important part of healthy meals when you have diabetes. Carbohydrate is found in many foods. · Learn which foods have carbs. And learn the amounts of carbs in different foods. ¨ Bread, cereal, pasta, and rice have about 15 grams of carbs in a serving. A serving is 1 slice of bread (1 ounce), ½ cup of cooked cereal, or 1/3 cup of cooked pasta or rice. ¨ Fruits have 15 grams of carbs in a serving. A serving is 1 small fresh fruit, such as an apple or orange; ½ of a banana; ½ cup of cooked or canned fruit; ½ cup of fruit juice; 1 cup of melon or raspberries; or 2 tablespoons of dried fruit. ¨ Milk and no-sugar-added yogurt have 15 grams of carbs in a serving. A serving is 1 cup of milk or 2/3 cup of no-sugar-added yogurt. ¨ Starchy vegetables have 15 grams of carbs in a serving.  A serving is ½ cup of mashed potatoes or sweet potato; 1 cup winter squash; ½ of a small baked potato; ½ cup of cooked beans; or ½ cup cooked corn or green peas. · Learn how much carbs to eat each day and at each meal. A dietitian or CDE can teach you how to keep track of the amount of carbs you eat. This is called carbohydrate counting. · If you are not sure how to count carbohydrate grams, use the Plate Method to plan meals. It is a good, quick way to make sure that you have a balanced meal. It also helps you spread carbs throughout the day. ¨ Divide your plate by types of foods. Put non-starchy vegetables on half the plate, meat or other protein food on one-quarter of the plate, and a grain or starchy vegetable in the final quarter of the plate. To this you can add a small piece of fruit and 1 cup of milk or yogurt, depending on how many carbs you are supposed to eat at a meal. 
· Try to eat about the same amount of carbs at each meal. Do not \"save up\" your daily allowance of carbs to eat at one meal. 
· Proteins have very little or no carbs per serving. Examples of proteins are beef, chicken, turkey, fish, eggs, tofu, cheese, cottage cheese, and peanut butter. A serving size of meat is 3 ounces, which is about the size of a deck of cards. Examples of meat substitute serving sizes (equal to 1 ounce of meat) are 1/4 cup of cottage cheese, 1 egg, 1 tablespoon of peanut butter, and ½ cup of tofu. How can you eat out and still eat healthy? · Learn to estimate the serving sizes of foods that have carbohydrate. If you measure food at home, it will be easier to estimate the amount in a serving of restaurant food. · If the meal you order has too much carbohydrate (such as potatoes, corn, or baked beans), ask to have a low-carbohydrate food instead. Ask for a salad or green vegetables. · If you use insulin, check your blood sugar before and after eating out to help you plan how much to eat in the future. · If you eat more carbohydrate at a meal than you had planned, take a walk or do other exercise. This will help lower your blood sugar. What else should you know? · Limit saturated fat, such as the fat from meat and dairy products. This is a healthy choice because people who have diabetes are at higher risk of heart disease. So choose lean cuts of meat and nonfat or low-fat dairy products. Use olive or canola oil instead of butter or shortening when cooking. · Don't skip meals. Your blood sugar may drop too low if you skip meals and take insulin or certain medicines for diabetes. · Check with your doctor before you drink alcohol. Alcohol can cause your blood sugar to drop too low. Alcohol can also cause a bad reaction if you take certain diabetes medicines. Follow-up care is a key part of your treatment and safety. Be sure to make and go to all appointments, and call your doctor if you are having problems. It's also a good idea to know your test results and keep a list of the medicines you take. Where can you learn more? Go to http://cori-rojas.info/. Enter X286 in the search box to learn more about \"Learning About Diabetes Food Guidelines. \" Current as of: March 13, 2017 Content Version: 11.4 © 2514-4304 Healthwise, [employer]. Care instructions adapted under license by [employer] (which disclaims liability or warranty for this information). If you have questions about a medical condition or this instruction, always ask your healthcare professional. [place] [address] any warranty or liability for your use of this information. Learning About Meal Planning for Diabetes Why plan your meals? Meal planning can be a key part of managing diabetes. Planning meals and snacks with the right balance of carbohydrate, protein, and fat can help you keep your blood sugar at the target level you set with your doctor. You don't have to eat special foods. You can eat what your family eats, including sweets once in a while. But you do have to pay attention to how often you eat and how much you eat of certain foods. You may want to work with a dietitian or a certified diabetes educator. He or she can give you tips and meal ideas and can answer your questions about meal planning. This health professional can also help you reach a healthy weight if that is one of your goals. What plan is right for you? Your dietitian or diabetes educator may suggest that you start with the plate format or carbohydrate counting. The plate format The plate format is a simple way to help you manage how you eat. You plan meals by learning how much space each food should take on a plate. Using the plate format helps you spread carbohydrate throughout the day. It can make it easier to keep your blood sugar level within your target range. It also helps you see if you're eating healthy portion sizes. To use the plate format, you put non-starchy vegetables on half your plate. Add meat or meat substitutes on one-quarter of the plate. Put a grain or starchy vegetable (such as brown rice or a potato) on the final quarter of the plate. You can add a small piece of fruit and some low-fat or fat-free milk or yogurt, depending on your carbohydrate goal for each meal. 
Here are some tips for using the plate format: · Make sure that you are not using an oversized plate. A 9-inch plate is best. Many restaurants use larger plates. · Get used to using the plate format at home. Then you can use it when you eat out. · Write down your questions about using the plate format. Talk to your doctor, a dietitian, or a diabetes educator about your concerns. Carbohydrate counting With carbohydrate counting, you plan meals based on the amount of carbohydrate in each food.  Carbohydrate raises blood sugar higher and more quickly than any other nutrient. It is found in desserts, breads and cereals, and fruit. It's also found in starchy vegetables such as potatoes and corn, grains such as rice and pasta, and milk and yogurt. Spreading carbohydrate throughout the day helps keep your blood sugar levels within your target range. Your daily amount depends on several things, including your weight, how active you are, which diabetes medicines you take, and what your goals are for your blood sugar levels. A registered dietitian or diabetes educator can help you plan how much carbohydrate to include in each meal and snack. A guideline for your daily amount of carbohydrate is: · 45 to 60 grams at each meal. That's about the same as 3 to 4 carbohydrate servings. · 15 to 20 grams at each snack. That's about the same as 1 carbohydrate serving. The Nutrition Facts label on packaged foods tells you how much carbohydrate is in a serving of the food. First, look at the serving size on the food label. Is that the amount you eat in a serving? All of the nutrition information on a food label is based on that serving size. So if you eat more or less than that, you'll need to adjust the other numbers. Total carbohydrate is the next thing you need to look for on the label. If you count carbohydrate servings, one serving of carbohydrate is 15 grams. For foods that don't come with labels, such as fresh fruits and vegetables, you'll need a guide that lists carbohydrate in these foods. Ask your doctor, dietitian, or diabetes educator about books or other nutrition guides you can use. If you take insulin, you need to know how many grams of carbohydrate are in a meal. This lets you know how much rapid-acting insulin to take before you eat. If you use an insulin pump, you get a constant rate of insulin during the day. So the pump must be programmed at meals to give you extra insulin to cover the rise in blood sugar after meals. When you know how much carbohydrate you will eat, you can take the right amount of insulin. Or, if you always use the same amount of insulin, you need to make sure that you eat the same amount of carbohydrate at meals. If you need more help to understand carbohydrate counting and food labels, ask your doctor, dietitian, or diabetes educator. How do you get started with meal planning? Here are some tips to get started: 
· Plan your meals a week at a time. Don't forget to include snacks too. · Use cookbooks or online recipes to plan several main meals. Plan some quick meals for busy nights. You also can double some recipes that freeze well. Then you can save half for other busy nights when you don't have time to cook. · Make sure you have the ingredients you need for your recipes. If you're running low on basic items, put these items on your shopping list too. · List foods that you use to make breakfasts, lunches, and snacks. List plenty of fruits and vegetables. · Post this list on the refrigerator. Add to it as you think of more things you need. · Take the list to the store to do your weekly shopping. Follow-up care is a key part of your treatment and safety. Be sure to make and go to all appointments, and call your doctor if you are having problems. It's also a good idea to know your test results and keep a list of the medicines you take. Where can you learn more? Go to http://cori-rojas.info/. Nalini Alejandre in the search box to learn more about \"Learning About Meal Planning for Diabetes. \" Current as of: March 13, 2017 Content Version: 11.4 © 8083-5188 [employer]. Care instructions adapted under license by [employer] (which disclaims liability or warranty for this information).  If you have questions about a medical condition or this instruction, always ask your healthcare professional. [employer] disclaims any warranty or liability for your use of this information. Nutrition Tips for Diabetes: After Your Visit Your Care Instructions A healthy diet is important to manage diabetes. It helps you lose weight (if you need to) and keep it off. It gives you the nutrition and energy your body needs and helps prevent heart disease. But a diet for diabetes does not mean that you have to eat special foods. You can eat what your family eats, including occasional sweets and other favorites. But you do have to pay attention to how often you eat and how much you eat of certain foods. The right plan for you will give you meals that help you keep your blood sugar at healthy levels. Try to eat a variety of foods and to spread carbohydrate throughout the day. Carbohydrate raises blood sugar higher and more quickly than any other nutrient does. Carbohydrate is found in sugar, breads and cereals, fruit, starchy vegetables such as potatoes and corn, and milk and yogurt. You may want to work with a dietitian or diabetes educator to help you plan meals and snacks. A dietitian or diabetes educator also can help you lose weight if that is one of your goals. The following tips can help you enjoy your meals and stay healthy. Follow-up care is a key part of your treatment and safety. Be sure to make and go to all appointments, and call your doctor if you are having problems. Its also a good idea to know your test results and keep a list of the medicines you take. How can you care for yourself at home? · Learn which foods have carbohydrate and how much carbohydrate to eat. A dietitian or diabetes educator can help you learn to keep track of how much carbohydrate you eat. · Spread carbohydrate throughout the day. Eat some carbohydrate at all meals, but do not eat too much at any one time. · Plan meals to include food from all the food groups. These are the food groups and some example portion sizes: ¨ Grains: 1 slice of bread (1 ounce), ½ cup of cooked cereal, and 1/3 cup of cooked pasta or rice. These have about 15 grams of carbohydrate in a serving. Choose whole grains such as whole wheat bread or crackers, oatmeal, and brown rice more often than refined grains. ¨ Fruit: 1 small fresh fruit, such as an apple or orange; ½ of a banana; ½ cup of chopped, cooked, or canned fruit; ½ cup of fruit juice; 1 cup of melon or raspberries; and 2 tablespoons of dried fruit. These have about 15 grams of carbohydrate in a serving. ¨ Dairy: 1 cup of nonfat or low-fat milk and 2/3 cup of plain yogurt. These have about 15 grams of carbohydrate in a serving. ¨ Protein foods: Beef, chicken, turkey, fish, eggs, tofu, cheese, cottage cheese, and peanut butter. A serving size of meat is 3 ounces, which is about the size of a deck of cards. Examples of meat substitute serving sizes (equal to 1 ounce of meat) are 1/4 cup of cottage cheese, 1 egg, 1 tablespoon of peanut butter, and ½ cup of tofu. These have very little or no carbohydrate per serving. ¨ Vegetables: Starchy vegetables such as ½ cup of cooked dried beans, peas, potatoes, or corn have about 15 grams of carbohydrate. Nonstarchy vegetables have very little carbohydrate, such as 1 cup of raw leafy vegetables (such as spinach), ½ cup of other vegetables (cooked or chopped), and 3/4 cup of vegetable juice. · Use the plate format to plan meals. It is a good, quick way to make sure that you have a balanced meal. It also helps you spread carbohydrate throughout the day. You divide your plate by types of foods. Put vegetables on half the plate, meat or meat substitutes on one-quarter of the plate, and a grain or starchy vegetable (such as brown rice or a potato) in the final quarter of the plate.  To this you can add a small piece of fruit and 1 cup of milk or yogurt, depending on how much carbohydrate you are supposed to eat at a meal. 
 · Talk to your dietitian or diabetes educator about ways to add limited amounts of sweets into your meal plan. You can eat these foods now and then, as long as you include the amount of carbohydrate they have in your daily carbohydrate allowance. · If you drink alcohol, limit it to no more than 1 drink a day for women and 2 drinks a day for men. If you are pregnant, no amount of alcohol is known to be safe. · Protein, fat, and fiber do not raise blood sugar as much as carbohydrate does. If you eat a lot of these nutrients in a meal, your blood sugar will rise more slowly than it would otherwise. · Limit saturated fats, such as those from meat and dairy products. Try to replace it with monounsaturated fat, such as olive oil. This is a healthier choice because people who have diabetes are at higher-than-average risk of heart disease. But use a modest amount of olive oil. A tablespoon of olive oil has 14 grams of fat and 120 calories. · Exercise lowers blood sugar. If you take insulin by shots or pump, you can use less than you would if you were not exercising. Keep in mind that timing matters. If you exercise within 1 hour after a meal, your body may need less insulin for that meal than it would if you exercised 3 hours after the meal. Test your blood sugar to find out how exercise affects your need for insulin. · Exercise on most days of the week. Aim for at least 30 minutes. Exercise helps you stay at a healthy weight and helps your body use insulin. Walking is an easy way to get exercise. Gradually increase the amount you walk every day. You also may want to swim, bike, or do other activities. When you eat out · Learn to estimate the serving sizes of foods that have carbohydrate. If you measure food at home, it will be easier to estimate the amount in a serving of restaurant food.  
· If the meal you order has too much carbohydrate (such as potatoes, corn, or baked beans), ask to have a low-carbohydrate food instead. Ask for a salad or green vegetables. · If you use insulin, check your blood sugar before and after eating out to help you plan how much to eat in the future. · If you eat more carbohydrate at a meal than you had planned, take a walk or do other exercise. This will help lower your blood sugar. Where can you learn more? Go to PostBeyond.be Enter X365 in the search box to learn more about \"Nutrition Tips for Diabetes: After Your Visit. \"  
© 6467-2057 Healthwise, [employer]orporated. Care instructions adapted under license by Leonid Moses (which disclaims liability or warranty for this information). This care instruction is for use with your licensed healthcare professional. If you have questions about a medical condition or this instruction, always ask your healthcare professional. [address] any warranty or liability for your use of this information. Content Version: 08.2.550469; Current as of: June 4, 2014 Introducing [facility]! Leonid Moses introduces [employer] patient portal. Now you can access parts of your medical record, email your doctor's office, and request medication refills online. 1. In your internet browser, go to [email or web address] 2. Click on the First Time User? Click Here link in the Sign In box. You will see the New Member Sign Up page. 3. Enter your [employer] Access Code exactly as it appears below. You will not need to use this code after youve completed the sign-up process. If you do not sign up before the expiration date, you must request a new code. · [employer] Access Code: [email or web address] Expires: 6/11/2018  5:23 AM 
 
4. Enter the last four digits of your Social Security Number (xxxx) and Date of Birth (mm/dd/yyyy) as indicated and click Submit. You will be taken to the next sign-up page. 5. Create a Inveshare ID. This will be your Inveshare login ID and cannot be changed, so think of one that is secure and easy to remember. 6. Create a Inveshare password. You can change your password at any time. 7. Enter your Password Reset Question and Answer. This can be used at a later time if you forget your password. 8. Enter your e-mail address. You will receive e-mail notification when new information is available in [address] 9. Click Sign Up. You can now view and download portions of your medical record. 10. Click the Download Summary menu link to download a portable copy of your medical information. If you have questions, please visit the Frequently Asked Questions section of the Inveshare website. Remember, Inveshare is NOT to be used for urgent needs. For medical emergencies, dial 911. Now available from your iPhone and Android! Please provide this summary of care documentation to your next provider. Your primary care clinician is listed as Yuni Johnson. If you have any questions after today's visit, please call [phone].

## 2018-06-08 NOTE — PROGRESS NOTES
[facility]    CC: F/U for DMII    HPI:   DMII:  - Taking medication as prescribed  - Denies issues or side effects from her medication  - Checking blood sugar at home  - FBS range of 111-258.  Average FBS is 208  - Reports that she needs diabetic diet information again  - Endorses drinking more water and urinating more frequently      ROS: Positive items marked in RED  CON: fever, chills  Cardiovascular: palpitations, CP  Resp: cough, SOB  GI: nausea, vomiting, diarrhea  : dysuria, hematuria      Past Medical History:   Diagnosis Date    Diverticulosis     DM type 2 (diabetes mellitus, type 2) ([facility] [address] 2012    Goiter 1994    resolved    Hypercalcemia     nl PTH    Hyperlipidemia 2015    Hypertension 2010    Immunization refused     pt doesn't want any shots       Past Surgical History:   Procedure Laterality Date    HX CATARACT REMOVAL Bilateral 2000    Dr. Ambar Gómez  2014     [facility] HX TOTAL ABDOMINAL [address]    Patient states Total hysterectomy       Family History   Problem Relation Age of Onset    Cancer Father     Breast Cancer Sister        Social History     Social History    Marital status: UNKNOWN     Spouse name: N/A    Number of children: N/A    Years of education: N/A     Occupational History    retired nurse      Social History Main Topics    Smoking status: Never Smoker    Smokeless tobacco: Never Used    Alcohol use No    Drug use: No    Sexual activity: No      Comment:      Other Topics Concern    None     Social History Narrative       Allergies   Allergen Reactions    Codeine Other (comments)     Felt like a different person, per patient    Iodine Itching    Pcn [Penicillins] Itching    Sulfa (Sulfonamide Antibiotics) Unknown (comments)    Vicodin [Hydrocodone-Acetaminophen] Nausea Only         Current Outpatient Prescriptions:     LORazepam (ATIVAN) 0.5 mg tablet, TAKE 1 TAB BY MOUTH EVERY NIGHT AT BEDTIME, Disp: , Rfl: 0    MULTIVITAMINS W/C PO, Take 1 Tab by mouth daily. , Disp: , Rfl:     Blood Glucose Control, Low (TRUE METRIX LEVEL 1) soln, Use as directed to check home blood sugar for DM II E11.9, Disp: 1 Each, Rfl: 12    glucose blood VI test strips (TRUE METRIX GLUCOSE TEST STRIP) strip, TEST BLOOD SUGAR ONE TIME DAILY for DM II E11.9, Disp: 100 Strip, Rfl: 3    lancets (TRUEPLUS LANCETS) 33 gauge misc, Use daily to check blood sugar for DM II E11.9, Disp: 100 Lancet, Rfl: 3    alcohol swabs (BD SINGLE USE SWABS REGULAR) pad, Use to clean finger to test blood sugar 1 time daily for DM II E11.9., Disp: 100 Pad, Rfl: 3    famotidine (PEPCID) 20 mg tablet, Take 1 Tab by mouth daily. , Disp: 90 Tab, Rfl: 1    donepezil (ARICEPT) 5 mg tablet, Take 1 Tab by mouth nightly., Disp: 30 Tab, Rfl: 1    SITagliptin (JANUVIA) 100 mg tablet, Take 1 Tab by mouth daily. Take 1 tab by mouth daily. Please discontinue all Jardiance Refills. , Disp: 30 Tab, Rfl: 3    amLODIPine (NORVASC) 5 mg tablet, Take 5 mg by mouth daily. , Disp: , Rfl:     atorvastatin (LIPITOR) 20 mg tablet, Take 1 Tab by mouth daily. , Disp: 30 Tab, Rfl: 5    benazepril (LOTENSIN) 40 mg tablet, Take 1 Tab by mouth daily. , Disp: 90 Tab, Rfl: 1    aspirin delayed-release 81 mg tablet, Take 81 mg by mouth daily. , Disp: , Rfl:     omega-3 fatty acids-vitamin e (FISH OIL) 1,000 mg cap, Take 1 Cap by mouth daily. One capsule daily. , Disp: 60 Cap, Rfl: 5    Blood-Glucose Meter (TRUE METRIX AIR GLUCOSE METER) [facility], Use as directed to check home blood sugar for DM II E11.9, Disp: 1 Each, Rfl: 0    diphenhydrAMINE (BENADRYL) 50 mg tablet, Take 1 Tab by mouth nightly as needed for Sleep., Disp: 60 Tab, Rfl: 1    Physical Exam:      /73  Pulse 85  Temp 98.7 °F (37.1 °C) (Oral)   Resp 16  Ht 5' 2\" (1.575 m)  Wt 119 lb (54 kg)  BMI 21.77 kg/m2    General:  WD, WN, NAD  Eyes: sclera clear bilaterally, no discharge noted, eyelids normal in appearance  HENT: NCAT  Lungs: CTAB, Normal respiratory effort and rate  CV: RRR, no MRGs  ABD: soft, non-tender, non-distended, normal bowel sounds  Ext: no peripheral edema or digital cyanosis noted  Skin: normal temperature, turgor, color, and texture  Neuro: Speech normal, Moving all extremities    Results for Alisson Smith (MRN [id number]):   Ref.  Range 6/8/2018 10:40   Color (UA POC) Unknown Yellow   Clarity (UA POC) Unknown Clear   Specific gravity (UA POC) Latest Ref Range: 1.001 - 1.035  1.015   pH (UA POC) Latest Ref Range: 4.6 - 8.0  5.5   Protein (UA POC) Latest Ref Range: Negative  Negative   Glucose (UA POC) Latest Ref Range: Negative  2+   Ketones (UA POC) Latest Ref Range: Negative  Trace   Blood (UA POC) Latest Ref Range: Negative  Negative   Bilirubin (UA POC) Latest Ref Range: Negative  Negative   Urobilinogen (UA POC) Latest Ref Range: 0.2 - 1  0.2 mg/dL   Nitrites (UA POC) Latest Ref Range: Negative  Negative   Leukocyte esterase (UA POC) Latest Ref Range: Negative  Negative       Assessment/Plan     DMII, Inadequately Controlled:  - FBS not at goal of <130  - Will start on metformin 1000 mg daily  - Handout given on diabetic food guidelines, diabetic nutrition tips, and diabetic meal planning  - Follow up in one month        Niesha Frost MD  6/8/2018, 10:50 AM

## 2018-06-15 ENCOUNTER — TELEPHONE (OUTPATIENT)
Dept: SLEEP MEDICINE | Age: 83
End: 2018-06-15

## 2018-06-15 ENCOUNTER — TELEPHONE (OUTPATIENT)
Dept: FAMILY MEDICINE CLINIC | Age: 83
End: 2018-06-15

## 2018-06-15 NOTE — TELEPHONE ENCOUNTER
Patient called requesting a recommendation of what type of medication is okay to use for constipation. Verbally discussed with Dr. Amanda Tran. PCP recommended Miralax. Patient made aware to take Miralax.

## 2018-07-05 ENCOUNTER — TELEPHONE (OUTPATIENT)
Dept: FAMILY MEDICINE CLINIC | Age: 83
End: 2018-07-05

## 2018-07-05 NOTE — TELEPHONE ENCOUNTER
Patient's daughter Sarah Reason called to inform that her mother's blood sugar is 110. Daughter wanted to know if patient should take her medication for blood sugar being at 110. Dr. Loni Gustafson verbally informed me that patient should indeed take her medication. I told Sarah Oneill that her mother should take her medication according to Dr. Loni Gustafson. Daughter stated understanding. Daughter also informed me that patient would like to cancel appointment for 7/6/18. Transferred call to  for cancellation.

## 2018-07-13 ENCOUNTER — APPOINTMENT (OUTPATIENT)
Dept: NUTRITION | Age: 83
End: 2018-07-13

## 2018-07-18 ENCOUNTER — OFFICE VISIT (OUTPATIENT)
Dept: FAMILY MEDICINE CLINIC | Age: 83
End: 2018-07-18

## 2018-07-18 VITALS
BODY MASS INDEX: 21.53 KG/M2 | WEIGHT: 117 LBS | SYSTOLIC BLOOD PRESSURE: 145 MMHG | OXYGEN SATURATION: 98 % | HEART RATE: 85 BPM | HEIGHT: 62 IN | RESPIRATION RATE: 16 BRPM | DIASTOLIC BLOOD PRESSURE: 71 MMHG | TEMPERATURE: 98.1 F

## 2018-07-18 DIAGNOSIS — K21.9 GASTROESOPHAGEAL REFLUX DISEASE, ESOPHAGITIS PRESENCE NOT SPECIFIED: ICD-10-CM

## 2018-07-18 DIAGNOSIS — G47.00 INSOMNIA, UNSPECIFIED TYPE: ICD-10-CM

## 2018-07-18 DIAGNOSIS — E86.0 DEHYDRATION: Primary | ICD-10-CM

## 2018-07-18 DIAGNOSIS — E11.9 TYPE 2 DIABETES MELLITUS WITHOUT COMPLICATION, WITHOUT LONG-TERM CURRENT USE OF INSULIN (HCC): ICD-10-CM

## 2018-07-18 RX ORDER — REPAGLINIDE 0.5 MG/1
0.5 TABLET ORAL
Qty: 90 TAB | Refills: 4 | Status: SHIPPED | OUTPATIENT
Start: 2018-07-18 | End: 2018-08-20 | Stop reason: ALTCHOICE

## 2018-07-18 RX ORDER — FAMOTIDINE 20 MG/1
20 TABLET, FILM COATED ORAL DAILY
Qty: 90 TAB | Refills: 1 | Status: CANCELLED | OUTPATIENT
Start: 2018-07-18

## 2018-07-18 RX ORDER — FAMOTIDINE 20 MG/1
20 TABLET, FILM COATED ORAL DAILY
Qty: 90 TAB | Refills: 1 | Status: SHIPPED | OUTPATIENT
Start: 2018-07-18 | End: 2019-05-07 | Stop reason: SDUPTHER

## 2018-07-18 NOTE — PATIENT INSTRUCTIONS
[place] Juice: Trop50; No Pulp Calcium + Vitamin DRepaglinide (By mouth)   Repaglinide (rg-DDW-jr-nide)  Treats type 2 diabetes. Brand Name(s): Prandin   There may be other brand names for this medicine. When This Medicine Should Not Be Used: This medicine is not right for everyone. Do not use it if you had an allergic reaction to repaglinide. How to Use This Medicine:   Tablet  · Take your medicine as directed. Your dose may need to be changed several times to find what works best for you. · Take this medicine before each meal, up to 4 times per day. You make take it up to 30 minutes before the meal.  · Missed dose: Take a dose as soon as you remember. If it is almost time for your next dose, wait until then and take a regular dose. Do not take extra medicine to make up for a missed dose. Do not take this medicine if you skip a meal.  · Store the medicine in a closed container at room temperature, away from heat, moisture, and direct light. Drugs and Foods to Avoid:   Ask your doctor or pharmacist before using any other medicine, including over-the-counter medicines, vitamins, and herbal products. · Do not take gemfibrozil while you are using this medicine. · Some medicines can affect how repaglinide works.  Tell your doctor if you are using any of the following:  ¨ Albuterol, carbamazepine, clarithromycin, clonidine, clopidogrel, clozapine, cyclosporine, danazol, deferasirox, disopyramide, erythromycin, fluoxetine, guanethidine, isoniazid, itraconazole, ketoconazole, montelukast, niacin, octreotide, olanzapine, pentoxifylline, phenobarbital, pramlintide, reserpine, rifampin, somatropin, terbutaline, or trimethoprim  ¨ Birth control pill  ¨ Blood pressure medicine  ¨ Diuretic (water pill)  ¨ Insulin or other diabetes medicine  ¨ MAO inhibitor  ¨ Medicine to treat HIV infection  ¨ NSAID pain or arthritis medicine (including aspirin, celecoxib, diclofenac, ibuprofen, naproxen)  ¨ Phenothiazine medicine  ¨ Steroid medicine  ¨ Sulfa drug  ¨ Thyroid medicine  Warnings While Using This Medicine:   · Tell your doctor if you are pregnant or breastfeeding, or if you have kidney disease, liver disease, or heart problems. · This medicine may cause low blood sugar levels. · This medicine may make you dizzy or drowsy. Do not drive or do anything else that could be dangerous until you know how this medicine affects you. · Tell any doctor or dentist who treats you that you are using this medicine. This medicine may affect certain medical test results. · Your doctor will do lab tests at regular visits to check on the effects of this medicine. Keep all appointments. · Keep all medicine out of the reach of children. Never share your medicine with anyone. Possible Side Effects While Using This Medicine:   Call your doctor right away if you notice any of these side effects:  · Allergic reaction: Itching or hives, swelling in your face or hands, swelling or tingling in your mouth or throat, chest tightness, trouble breathing  · Blistering, peeling, red skin rash  · Seizures  · Shaking, trembling, sweating, fast or pounding heartbeat, faintness or lightheadedness, hunger, confusion  If you notice these less serious side effects, talk with your doctor:   · Cough, runny or stuffy nose, sore throat  · Weight gain  If you notice other side effects that you think are caused by this medicine, tell your doctor. Call your doctor for medical advice about side effects. You may report side effects to FDA at 3-547-FDA-7607  © 2017 [address] Information is for End User's use only and may not be sold, redistributed or otherwise used for commercial purposes. The above information is an  only. It is not intended as medical advice for individual conditions or treatments. Talk to your doctor, nurse or pharmacist before following any medical regimen to see if it is safe and effective for you. Dehydration: Care Instructions  Your Care Instructions  Dehydration happens when your body loses too much fluid. This might happen when you do not drink enough water or you lose large amounts of fluids from your body because of diarrhea, vomiting, or sweating. Severe dehydration can be life-threatening. Water and minerals called electrolytes help put your body fluids back in balance. Learn the early signs of fluid loss, and drink more fluids to prevent dehydration. Follow-up care is a key part of your treatment and safety. Be sure to make and go to all appointments, and call your doctor if you are having problems. It's also a good idea to know your test results and keep a list of the medicines you take. How can you care for yourself at home? · To prevent dehydration, drink plenty of fluids, enough so that your urine is light yellow or clear like water. Choose water and other caffeine-free clear liquids until you feel better. If you have kidney, heart, or liver disease and have to limit fluids, talk with your doctor before you increase the amount of fluids you drink. · If you do not feel like eating or drinking, try taking small sips of water, sports drinks, or other rehydration drinks. · Get plenty of rest.  To prevent dehydration  · Add more fluids to your diet and daily routine, unless your doctor has told you not to. · During hot weather, drink more fluids. Drink even more fluids if you exercise a lot. Stay away from drinks with alcohol or caffeine. · Watch for the symptoms of dehydration. These include:  ¨ A dry, sticky mouth. ¨ Dark yellow urine, and not much of it. ¨ Dry and sunken eyes. ¨ Feeling very tired. · Learn what problems can lead to dehydration. These include:  ¨ Diarrhea, fever, and vomiting. ¨ Any illness with a fever, such as pneumonia or the flu. ¨ Activities that cause heavy sweating, such as endurance races and heavy outdoor work in hot or humid weather.   ¨ Alcohol or drug abuse or withdrawal.  ¨ Certain medicines, such as cold and allergy pills (antihistamines), diet pills (diuretics), and laxatives. ¨ Certain diseases, such as diabetes, cancer, and heart or kidney disease. When should you call for help? Call 911 anytime you think you may need emergency care. For example, call if:    · You passed out (lost consciousness).    Call your doctor now or seek immediate medical care if:    · You are confused and cannot think clearly.     · You are dizzy or lightheaded, or you feel like you may faint.     · You have signs of needing more fluids. You have sunken eyes and a dry mouth, and you pass only a little dark urine.     · You cannot keep fluids down.    Watch closely for changes in your health, and be sure to contact your doctor if:    · You are not making tears.     · Your skin is very dry and sags slowly back into place after you pinch it.     · Your mouth and eyes are very dry. Where can you learn more? Go to http://cori-rojas.info/. Enter S518 in the search box to learn more about \"Dehydration: Care Instructions. \"  Current as of: November 20, 2017  Content Version: 11.7  © 4554-3147 [employer]. Care instructions adapted under license by [employer] (which disclaims liability or warranty for this information). If you have questions about a medical condition or this instruction, always ask your healthcare professional. [place] [address] any warranty or liability for your use of this information.

## 2018-07-18 NOTE — PROGRESS NOTES
Chief Complaint   Patient presents with   [facility] Follow Up     1. Have you been to the ER, urgent care clinic since your last visit? Hospitalized since your last visit? Yes [facility]    2. Have you seen or consulted any other health care providers outside of the [address] [place] [address] since your last visit? Include any pap smears or colon screening. No    Patient declines pneumonia vaccine. Fall Risk Assessment, last 12 mths 7/18/2018   Able to walk? Yes   Fall in past 12 months?  No   Fall with injury? -   Number of falls in past 12 months -   Fall Risk Score -     PHQ over the last two weeks 7/18/2018   Little interest or pleasure in doing things Not at all   Feeling down, depressed, irritable, or hopeless Several days   Total Score PHQ 2 1

## 2018-07-18 NOTE — MR AVS SNAPSHOT
Dru Isatudenise Chua Svetlana [address] [facility] [address]. [address] [address] 
[phone] Patient: Alize Briggs MRN: [id number] Visit Information Date & Time Provider Department Dept. Phone Encounter #  
 7/18/2018 11:15 AM Jackelin Andrea Dennis [id number] Follow-up Instructions Return in about 1 month (around 8/18/2018). Your Appointments 8/7/2018  2:00 PM  
Follow Up with MD Bianca Andrea 23 ([facility]) Appt Note: follow-up 30 min [facility] [address]. [address]  
  
   
 [address]  
  
    
 5/7/2019  9:30 AM  
Office Visit with MD Bianca Andrea 23 [facility]) Appt Note: [address] [facility] [address]. [address]  
  
   
 [address] Upcoming Health Maintenance Date Due Pneumococcal 65+ High/Highest Risk (1 of 2 - PCV13) 12/22/1994 EYE EXAM RETINAL OR DILATED Q1 3/28/2017 LIPID PANEL Q1 3/4/2018 GLAUCOMA SCREENING Q2Y 3/28/2018 Influenza Age 5 to Adult 8/1/2018 HEMOGLOBIN A1C Q6M 11/23/2018 FOOT EXAM Q1 5/7/2019 MICROALBUMIN Q1 5/7/2019 MEDICARE YEARLY EXAM 5/8/2019 DTaP/Tdap/Td series (2 - Td) 6/17/2026 Allergies as of 7/18/2018  Review Complete On: 7/18/2018 By: Jose A Zuñiga LPN Severity Noted Reaction Type Reactions Codeine  11/10/2015    Other (comments) Felt like a different person, per patient Iodine  11/10/2015    Itching Pcn [Penicillins]  11/10/2015    Itching Sulfa (Sulfonamide Antibiotics)  11/10/2015    Unknown (comments) Vicodin [Hydrocodone-acetaminophen]  11/10/2015    Nausea Only Current Immunizations  Reviewed on 3/26/2018 No immunizations on file. Not reviewed this visit You Were Diagnosed With   
 Codes Comments Dehydration    -  Primary ICD-10-CM: E86.0 ICD-9-CM: 276.51 Type 2 diabetes mellitus without complication, without long-term current use of insulin (HCC)     ICD-10-CM: E11.9 ICD-9-CM: 250.00 Insomnia, unspecified type     ICD-10-CM: G47.00 ICD-9-CM: 780.52 Gastroesophageal reflux disease, esophagitis presence not specified     ICD-10-CM: K21.9 ICD-9-CM: 530.81 Vitals BP Pulse Temp Resp SpO2 OB Status 145/71 85 98.1 °F (36.7 °C) 16 98% Hysterectomy Smoking Status Never Smoker Preferred Pharmacy Pharmacy Name Phone [facility][id number] [phone] [phone] Your Updated Medication List  
  
   
This list is accurate as of 7/18/18 11:56 AM.  Always use your most recent med list.  
  
  
  
  
 alcohol swabs Padm Commonly known as:  BD Single Use Swabs Regular Use to clean finger to test blood sugar 1 time daily for DM II E11.9. amLODIPine 5 mg tablet Commonly known as:  Solomon Royals Take 5 mg by mouth daily. aspirin delayed-release 81 mg tablet Take 81 mg by mouth daily. atorvastatin 20 mg tablet Commonly known as:  LIPITOR  
TAKE 1 TAB BY MOUTH DAILY. benazepril 40 mg tablet Commonly known as:  LOTENSIN Take 1 Tab by mouth daily. Blood Glucose Control, Low Soln Commonly known as:  TRUE METRIX LEVEL 1 Use as directed to check home blood sugar for DM II E11.9 Blood-Glucose Meter Misc Commonly known as:  TRUE METRIX AIR GLUCOSE METER Use as directed to check home blood sugar for DM II E11.9  
  
 diphenhydrAMINE 50 mg tablet Commonly known as:  BENADRYL Take 1 Tab by mouth nightly as needed for Sleep.  
  
 donepezil 5 mg tablet Commonly known as:  ARICEPT  
TAKE 1 TAB BY MOUTH NIGHTLY. famotidine 20 mg tablet Commonly known as:  PEPCID Take 1 Tab by mouth daily. FISH OIL 1,000 mg Cap Generic drug:  omega-3 fatty acids-vitamin e Take 1 Cap by mouth daily. One capsule daily. glucose blood VI test strips strip Commonly known as:  TRUE METRIX GLUCOSE TEST STRIP  
TEST BLOOD SUGAR ONE TIME DAILY for DM II E11.9  
  
 lancets 33 gauge Misc Commonly known as:  Kole Hutching Use daily to check blood sugar for DM II E11.9 LORazepam 0.5 mg tablet Commonly known as:  ATIVAN  
TAKE 1 TAB BY MOUTH EVERY NIGHT AT BEDTIME MULTIVITAMINS W/C PO Take 1 Tab by mouth daily. repaglinide 0.5 mg tablet Commonly known as:  Saulo Awe Take 1 Tab by mouth Before breakfast, lunch, and dinner. SITagliptin 100 mg tablet Commonly known as:  Wendy Pipe Take 1 Tab by mouth daily. Take 1 tab by mouth daily. Please discontinue all Jardiance Refills. Prescriptions Sent to Pharmacy Refills  
 repaglinide (PRANDIN) 0.5 mg tablet 4 Sig: Take 1 Tab by mouth Before breakfast, lunch, and dinner. Class: Normal  
 Pharm[facility] [address] [place], [place] [address]: [phone] Route: Oral  
 diphenhydrAMINE (BENADRYL) 50 mg tablet 1 Sig: Take 1 Tab by mouth nightly as needed for Sleep. Class: Normal  
 Pharmacy: [address] [place] [address] [address] Ph #: [phone] Route: Oral  
 famotidine (PEPCID) 20 mg tablet 1 Sig: Take 1 Tab by mouth daily. Class: Normal  
 [facility] [address] [place] [place] [address] Ph #: [phone] Route: Oral  
  
Follow-up Instructions Return in about 1 month (around 8/18/2018). To-Do List   
 07/25/2018  8:30 PM  
  Appointment with [facility] SLEEP RM 2 at [place] [address] [phone] [address] Sleep Disorders Centers:      [facility] [phone]: [address], 4th floor, [place], [address]      DR. HUBBARD [facility] [phone]; [address] [place] [address], [address], [place], [address]  Patient instructions ·  Please do not arrive prior to your scheduled appointment time as your room may not be ready. ·  Avoid afternoon naps, caffeine and alcoholic beverages the day of your study. ·  Please bring pajamas men bottoms, women tops and bottoms. We   ask that you do not bring a one piece nightgown to sleep in. ·  Please do not apply lotion after shower the day of your appointment  ·  Please do not apply leave in hair products, such as, oils, conditioners or hairspray. ·  Remove any hairpieces, such as, extensions, weaves & sewn in wigs prior to your appointment. If you arrive with sewn in hairpieces, we will   reschedule your procedure. The Sleep Disorders Centers are outpatient testing department. ·  We encourage you to bring a non-alcoholic/ non-caffeinated beverage and snack, if desired. The cafeteria is closed at night. ·  Please bring any medications that are routinely taken prior to bed. If you have been given a sedative for the study,  DO NOT TAKE THE SEDATIVE BEFORE ARRIVAL. Be advised that if the sedative is taken, we recommend that you not drive for 10 hours after taking it. ·  Diabetic patients should bring testing device, snack and any medications that may be needed. ·  Patients who require breathing treatments should bring the unit with them. ·  The person having the sleep study is the only person allowed in the testing room. If another individual needs to be present throughout the night to assist in the patients care, arrangements must be made prior to the scheduled study date. ·  During the study, we encourage a time free environment. Please refrain from checking the time. ·  The technologist will ask you to turn off your cell phone. ·  Televisions are available in each room but cannot remain on during the study; it interferes with monitoring equipment.  ·  During the study, the technologist will ask you to sleep on your back for a portion of the night.  ·  Showers are available following your sleep study, please bring any toiletry items. We will provide washcloths and towels. Thank you for choosing the [address]. If you have any questions prior to your appointment, please do not hesitate to contact us at [phone]. Patient Instructions [place] Juice: Trop50; No Pulp Calcium + Vitamin DRepaglinide (By mouth) Repaglinide (uy-VRR-ni-nide) Treats type 2 diabetes. Brand Name(s): Prandin There may be other brand names for this medicine. When This Medicine Should Not Be Used: This medicine is not right for everyone. Do not use it if you had an allergic reaction to repaglinide. How to Use This Medicine:  
Tablet · Take your medicine as directed. Your dose may need to be changed several times to find what works best for you. · Take this medicine before each meal, up to 4 times per day. You make take it up to 30 minutes before the meal. 
· Missed dose: Take a dose as soon as you remember. If it is almost time for your next dose, wait until then and take a regular dose. Do not take extra medicine to make up for a missed dose. Do not take this medicine if you skip a meal. 
· Store the medicine in a closed container at room temperature, away from heat, moisture, and direct light. Drugs and Foods to Avoid: Ask your doctor or pharmacist before using any other medicine, including over-the-counter medicines, vitamins, and herbal products. · Do not take gemfibrozil while you are using this medicine. · Some medicines can affect how repaglinide works. Tell your doctor if you are using any of the following: ¨ Albuterol, carbamazepine, clarithromycin, clonidine, clopidogrel, clozapine, cyclosporine, danazol, deferasirox, disopyramide, erythromycin, fluoxetine, guanethidine, isoniazid, itraconazole, ketoconazole, montelukast, niacin, octreotide, olanzapine, pentoxifylline, phenobarbital, pramlintide, reserpine, rifampin, somatropin, terbutaline, or trimethoprim ¨ Birth control pill ¨ Blood pressure medicine ¨ Diuretic (water pill) ¨ Insulin or other diabetes medicine ¨ MAO inhibitor ¨ Medicine to treat HIV infection ¨ NSAID pain or arthritis medicine (including aspirin, celecoxib, diclofenac, ibuprofen, naproxen) ¨ Phenothiazine medicine Maridee Laser Steroid medicine ¨ Sulfa drug ¨ Thyroid medicine Warnings While Using This Medicine: · Tell your doctor if you are pregnant or breastfeeding, or if you have kidney disease, liver disease, or heart problems. · This medicine may cause low blood sugar levels. · This medicine may make you dizzy or drowsy. Do not drive or do anything else that could be dangerous until you know how this medicine affects you. · Tell any doctor or dentist who treats you that you are using this medicine. This medicine may affect certain medical test results. · Your doctor will do lab tests at regular visits to check on the effects of this medicine. Keep all appointments. · Keep all medicine out of the reach of children. Never share your medicine with anyone. Possible Side Effects While Using This Medicine:  
Call your doctor right away if you notice any of these side effects: · Allergic reaction: Itching or hives, swelling in your face or hands, swelling or tingling in your mouth or throat, chest tightness, trouble breathing · Blistering, peeling, red skin rash · Seizures · Shaking, trembling, sweating, fast or pounding heartbeat, faintness or lightheadedness, hunger, confusion If you notice these less serious side effects, talk with your doctor: · Cough, runny or stuffy nose, sore throat · Weight gain If you notice other side effects that you think are caused by this medicine, tell your doctor. Call your doctor for medical advice about side effects. You may report side effects to FDA at 8-943-FDA-3636 © 2017 [address] Information is for End User's use only and may not be sold, redistributed or otherwise used for commercial purposes. The above information is an  only. It is not intended as medical advice for individual conditions or treatments. Talk to your doctor, nurse or pharmacist before following any medical regimen to see if it is safe and effective for you. Dehydration: Care Instructions Your Care Instructions Dehydration happens when your body loses too much fluid. This might happen when you do not drink enough water or you lose large amounts of fluids from your body because of diarrhea, vomiting, or sweating. Severe dehydration can be life-threatening. Water and minerals called electrolytes help put your body fluids back in balance. Learn the early signs of fluid loss, and drink more fluids to prevent dehydration. Follow-up care is a key part of your treatment and safety. Be sure to make and go to all appointments, and call your doctor if you are having problems. It's also a good idea to know your test results and keep a list of the medicines you take. How can you care for yourself at home? · To prevent dehydration, drink plenty of fluids, enough so that your urine is light yellow or clear like water. Choose water and other caffeine-free clear liquids until you feel better. If you have kidney, heart, or liver disease and have to limit fluids, talk with your doctor before you increase the amount of fluids you drink. · If you do not feel like eating or drinking, try taking small sips of water, sports drinks, or other rehydration drinks. · Get plenty of rest. 
To prevent dehydration · Add more fluids to your diet and daily routine, unless your doctor has told you not to. · During hot weather, drink more fluids. Drink even more fluids if you exercise a lot. Stay away from drinks with alcohol or caffeine. · Watch for the symptoms of dehydration. These include: ¨ A dry, sticky mouth. ¨ Dark yellow urine, and not much of it. ¨ Dry and sunken eyes. ¨ Feeling very tired. · Learn what problems can lead to dehydration. These include: ¨ Diarrhea, fever, and vomiting. ¨ Any illness with a fever, such as pneumonia or the flu. ¨ Activities that cause heavy sweating, such as endurance races and heavy outdoor work in hot or humid weather. ¨ Alcohol or drug abuse or withdrawal. 
¨ Certain medicines, such as cold and allergy pills (antihistamines), diet pills (diuretics), and laxatives. ¨ Certain diseases, such as diabetes, cancer, and heart or kidney disease. When should you call for help? Call 911 anytime you think you may need emergency care. For example, call if: 
  · You passed out (lost consciousness).  
 Call your doctor now or seek immediate medical care if: 
  · You are confused and cannot think clearly.  
  · You are dizzy or lightheaded, or you feel like you may faint.  
  · You have signs of needing more fluids. You have sunken eyes and a dry mouth, and you pass only a little dark urine.  
  · You cannot keep fluids down.  
 Watch closely for changes in your health, and be sure to contact your doctor if: 
  · You are not making tears.  
  · Your skin is very dry and sags slowly back into place after you pinch it.  
  · Your mouth and eyes are very dry. Where can you learn more? Go to http://cori-rojas.info/. Enter J680 in the search box to learn more about \"Dehydration: Care Instructions. \" Current as of: November 20, 2017 Content Version: 11.7 © 9012-7756 [employer]. Care instructions adapted under license by [employer] (which disclaims liability or warranty for this information).  If you have questions about a medical condition or this instruction, always ask your healthcare professional. Madleine Fitz[employer] disclaims any warranty or liability for your use of this information. Introducing [facility]! Carla Estes introduces [facility] patient portal. Now you can access parts of your medical record, email your doctor's office, and request medication refills online. 1. In your internet browser, go to [email or web address] 2. Click on the First Time User? Click Here link in the Sign In box. You will see the New Member Sign Up page. 3. Enter your [employer] Access Code exactly as it appears below. You will not need to use this code after youve completed the sign-up process. If you do not sign up before the expiration date, you must request a new code. · [employer] Access Code: [email or web address] Expires: 9/9/2018  7:46 AM 
 
4. Enter the last four digits of your Social Security Number (xxxx) and Date of Birth (mm/dd/yyyy) as indicated and click Submit. You will be taken to the next sign-up page. 5. Create a [employer] ID. This will be your [employer] login ID and cannot be changed, so think of one that is secure and easy to remember. 6. Create a [employer] password. You can change your password at any time. 7. Enter your Password Reset Question and Answer. This can be used at a later time if you forget your password. 8. Enter your e-mail address. You will receive e-mail notification when new information is available in [address] 9. Click Sign Up. You can now view and download portions of your medical record. 10. Click the Download Summary menu link to download a portable copy of your medical information. If you have questions, please visit the Frequently Asked Questions section of the [employer] website. Remember, [employer] is NOT to be used for urgent needs. For medical emergencies, dial 911. Now available from your iPhone and Android! Please provide this summary of care documentation to your next provider. Your primary care clinician is listed as Gio Guillermo. If you have any questions after today's visit, please call [phone].

## 2018-07-18 NOTE — PROGRESS NOTES
[address] CC: ED Follow Up  
 
HPI:  
 
Unable to view ED note and [facility] records as provider has not completed note. Weakness: 
- Daughter reports that pt was minimally responsive - Pt states that she was too weak to respond - EMS came to home and check blood sugar, which was normal 
- EMS brought her to the ED for evaluation on 7/10/18 - Pt had numerous tests done which were unremarkable - Was given bolus of IVF 
- Pt's condition significantly improved with saline administration - Daughter states that she was told that pt's weakness was due to dehydration DM II: 
- Monitors blood sugar at home - Log brought in for review 
- FBS log shows range of 110-129 with an average of 160 - Is not taking metformin currently - Reports upset stomach with metformin - Interested in taking a different medication GERD: 
- Has been off famotidine for two weeks - Pt complains of stomach discomfort at home - Denies any side effects or other issues with medication Insomnia: 
- Currently experiencing difficulty falling asleep 
- Has stopped taking benadryl at night for sleep - Pt thought that this medication had been d/c 
- Has not been taking any other medication or supplement to aid with sleep 
- Has a sleep study scheduled for 7/25/18 ROS: Positive items marked in RED 
CON: fever, chills Cardiovascular: palpitations (baseline), CP Resp: cough, SOB 
GI: nausea, vomiting, diarrhea : dysuria, hematuria Endo: polyuria, polydipsia Past Medical History:  
Diagnosis Date  Diverticulosis  DM type 2 (diabetes mellitus, type 2) ([facility] [address]  
 resolved  Hypercalcemia   
 nl PTH  Hyperlipidemia 2015  Hypertension 2010  Immunization refused   
 pt doesn't want any shots Past Surgical History:  
Procedure Laterality Date  HX CATARACT REMOVAL Bilateral 2000 Dr. Callaway Gene  HX COLONOSCOPY  2014 Barbie Rodríguez  [facility] Patient states Total hysterectomy Family History Problem Relation Age of Onset  Cancer Father  Breast Cancer Sister Social History Social History  Marital status: UNKNOWN Spouse name: N/A  
 Number of children: N/A  
 Years of education: N/A Occupational History  retired nurse Social History Main Topics  Smoking status: Never Smoker  Smokeless tobacco: Never Used  Alcohol use No  
 Drug use: No  
 Sexual activity: No  
   Comment:  Other Topics Concern  None Social History Narrative Allergies Allergen Reactions  Codeine Other (comments) Felt like a different person, per patient  Iodine Itching  Pcn [Penicillins] Itching  Sulfa (Sulfonamide Antibiotics) Unknown (comments)  Vicodin [Hydrocodone-Acetaminophen] Nausea Only Current Outpatient Prescriptions:  
  donepezil (ARICEPT) 5 mg tablet, TAKE 1 TAB BY MOUTH NIGHTLY., Disp: 90 Tab, Rfl: 1 
  atorvastatin (LIPITOR) 20 mg tablet, TAKE 1 TAB BY MOUTH DAILY. , Disp: 90 Tab, Rfl: 1 
  metFORMIN ER (GLUCOPHAGE XR) 500 mg tablet, Take 2 Tabs by mouth daily (with dinner). , Disp: 60 Tab, Rfl: 1   Blood-Glucose Meter (TRUE METRIX AIR GLUCOSE METER) misc, Use as directed to check home blood sugar for DM II E11.9, Disp: 1 Each, Rfl: 0 
  Blood Glucose Control, Low (TRUE METRIX LEVEL 1) soln, Use as directed to check home blood sugar for DM II E11.9, Disp: 1 Each, Rfl: 12 
  glucose blood VI test strips (TRUE METRIX GLUCOSE TEST STRIP) strip, TEST BLOOD SUGAR ONE TIME DAILY for DM II E11.9, Disp: 100 Strip, Rfl: 3 
  lancets (TRUEPLUS LANCETS) 33 gauge misc, Use daily to check blood sugar for DM II E11.9, Disp: 100 Lancet, Rfl: 3 
  alcohol swabs (BD SINGLE USE SWABS REGULAR) padm, Use to clean finger to test blood sugar 1 time daily for DM II E11.9., Disp: 100 Pad, Rfl: 3 
  famotidine (PEPCID) 20 mg tablet, Take 1 Tab by mouth daily. , Disp: 90 Tab, Rfl: 1 
  SITagliptin (JANUVIA) 100 mg tablet, Take 1 Tab by mouth daily. Take 1 tab by mouth daily. Please discontinue all Jardiance Refills. , Disp: 30 Tab, Rfl: 3 
  diphenhydrAMINE (BENADRYL) 50 mg tablet, Take 1 Tab by mouth nightly as needed for Sleep., Disp: 60 Tab, Rfl: 1 
  benazepril (LOTENSIN) 40 mg tablet, Take 1 Tab by mouth daily. , Disp: 90 Tab, Rfl: 1 
  LORazepam (ATIVAN) 0.5 mg tablet, TAKE 1 TAB BY MOUTH EVERY NIGHT AT BEDTIME, Disp: , Rfl: 0 
  MULTIVITAMINS W/C PO, Take 1 Tab by mouth daily. , Disp: , Rfl:  
  amLODIPine (NORVASC) 5 mg tablet, Take 5 mg by mouth daily. , Disp: , Rfl:  
  aspirin delayed-release 81 mg tablet, Take 81 mg by mouth daily. , Disp: , Rfl:  
  omega-3 fatty acids-vitamin e (FISH OIL) 1,000 mg cap, Take 1 Cap by mouth daily. One capsule daily. , Disp: 60 Cap, Rfl: 5 Physical Exam:  
  
/71  Pulse 85  Temp 98.1 °F (36.7 °C)  Resp 16  Ht 5' 2\" (1.575 m)  Wt 117 lb (53.1 kg)  SpO2 98%  BMI 21.4 kg/m2 General:  WD, WN, NAD HENT: NCAT Lungs: CTAB, Normal respiratory effort and rate CV: RRR, no MRGs ABD: soft, non-tender, non-distended, normal bowel sounds Ext: no peripheral edema or digital cyanosis noted Skin: normal color, turgor, texture , temperature Psych: alert, normal affect Neuro: Speech normal, moving all extremities, normal facial symmetry CBC WITH DIFFERENTIAL AUTO (07/10/2018 4:11 PM) CBC WITH DIFFERENTIAL AUTO (07/10/2018 4:11 PM) Component Value Ref Range WBC x 10*3 8.9 4.0 - 11.0 K/uL  
RBC x 10^6 4.42 3.80 - 5.20 M/uL  
HGB 12.9 11.7 - 16.1 g/dL HCT 38.0 35.1 - 48.3 % MCV 86 80 - 95 fL  
MCH 29 26 - 34 pg MCHC 34 31 - 36 g/dL  
RDW 13.3 10.0 - 15.5 % Platelet 797 893 - 764 K/uL MPV 8.9 (L) 9.0 - 13.0 fL Segmented Neutrophils 66 40 - 75 % Lymphocytes 22 20 - 45 % Monocytes 12 3 - 12 % Eosinophil 0 0 - 6 % Basophils 0 0 - 2 % Absolute Neutrophils 5.8 1.8 - 7.7 K/uL Absolute Lymphocytes 1.9 1.0 - 4.8 K/uL Absolute Monocyte Count 1.1 (H) 0.1 - 1.0 K/uL Absolute Eosinophil 0.0 0.0 - 0.5 K/uL Absolute Basophil Count 0.0 0.0 - 0.2 K/uL BASIC METABOLIC PANEL (86/84/4994 4:11 PM) BASIC METABOLIC PANEL (05/91/1471 4:11 PM) Component Value Ref Range Potassium 4.0 3.5 - 5.5 mmol/L  
SODIUM 134 133 - 145 mmol/L  
CHLORIDE 98 98 - 110 mmol/L Glucose 132 (H) 70 - 99 mg/dL CALCIUM 10.0 8.4 - 10.5 mg/dL BUN 18 6 - 22 mg/dL CREATININE 0.7 (L) 0.8 - 1.4 mg/dL CO2 25 20 - 32 mmol/L  
eGFR African American >60.0 >60.0  
eGFR Non African American >60.0 >60.0 ANION GAP 11.0 mmol/L  
 
TROPONIN (07/10/2018 5:03 PM) TROPONIN (07/10/2018 5:03 PM) Component Value Ref Range Troponin (T) Quantitative <0.01 0.00 - 0.01 ng/mL Urinalysis with Microscopic (07/10/2018 3:00 PM) Urinalysis with Microscopic (07/10/2018 3:00 PM) Component Value Ref Range Urine pH 5.0 5.0 - 8.0 pH Urine Protein Screen Negative Negative, Trace mg/dL Urine Glucose 50* (A) Negative mg/dL Urine Ketones Negative Negative mg/dL Urine Occult Blood Negative Negative Urine Specific Gravity 1.017 1.005 - 1.030 Urine Nitrite Negative Negative Urine Leukocyte Esterase Negative Negative Urine Bilirubin Negative Negative Urine Urobilinogen <2.0 <2.0 mg/dL Urine RBC 20-50 (A) Negative, 0-2 /hpf  
Urine WBC 3-5 (A) 0 - 2 /hpf Squamous Epithelial Cells 0-2 0 - 2 /hpf Hyaline Cast Negative Negative /lpf Urine Calcium Oxalate Crystals Present (A) (none) Head CT (7/10/2018): Impression: 1.  No acute intracranial process or significant interval change. CXR (7/10/2018): Impression: 1. No acute cardiopulmonary disease. Assessment/Plan Dehydration: - Likely secondary to osmotic diuresis from poorly controlled diabetes - Pt counseled on importance of diabetes control and hydration 
- Handout given on care of dehydration - F/u as needed DM II, inadequately controlled: - FBS currently not at goal of <130 - D/C metformin due to GI side effects - Will start Prandin - Gave handout with information on new medication - Recommend Trop 50 orange juice - F/u one month GERD, inadequately controlled: - Resume prior famotidine regimen Insomnia, inadequately controlled: - Resume prior benadryl regimen - F/u one month Zayra Moseley MD 
7/18/2018, 11:09 AM

## 2018-07-25 ENCOUNTER — HOSPITAL ENCOUNTER (OUTPATIENT)
Dept: SLEEP MEDICINE | Age: 83
Discharge: HOME | End: 2018-07-25
Payer: MEDICARE

## 2018-07-25 DIAGNOSIS — G47.00 INSOMNIA: ICD-10-CM

## 2018-07-25 DIAGNOSIS — I10 HYPERTENSION: ICD-10-CM

## 2018-07-25 DIAGNOSIS — G47.33 OSA (OBSTRUCTIVE SLEEP APNEA): ICD-10-CM

## 2018-07-25 DIAGNOSIS — E11.9 TYPE II DIABETES MELLITUS (HCC): ICD-10-CM

## 2018-07-25 PROCEDURE — 95810 POLYSOM 6/> YRS 4/> PARAM: CPT

## 2018-07-26 VITALS
SYSTOLIC BLOOD PRESSURE: 132 MMHG | BODY MASS INDEX: 21.35 KG/M2 | HEIGHT: 62 IN | WEIGHT: 116 LBS | DIASTOLIC BLOOD PRESSURE: 78 MMHG

## 2018-07-26 NOTE — PROGRESS NOTES
· .Sleep study completed per physician order. · Patient discharged from sleep center. · Patient wake, alert, and able to leave the facility under their own power. · Instructed to follow up with their sleep physician for results.

## 2018-08-02 ENCOUNTER — TELEPHONE (OUTPATIENT)
Dept: FAMILY MEDICINE CLINIC | Age: 83
End: 2018-08-02

## 2018-08-06 DIAGNOSIS — E11.9 TYPE 2 DIABETES MELLITUS WITHOUT COMPLICATION, WITHOUT LONG-TERM CURRENT USE OF INSULIN (HCC): Primary | ICD-10-CM

## 2018-08-06 RX ORDER — ACARBOSE 25 MG/1
25 TABLET ORAL
Qty: 90 TAB | Refills: 1 | Status: SHIPPED | OUTPATIENT
Start: 2018-08-06 | End: 2018-08-20 | Stop reason: SDUPTHER

## 2018-08-06 NOTE — TELEPHONE ENCOUNTER
Bernardo Go (daughter [phone]) called stating that her mother had an allergic reaction to Repaglinide that was prescribed on 7-18-18. Daughter stated that patient broke out with bumps on he hand  Between thumb and index finger. Patient's daughter called pharmacist on Saturday 8-4-18, and was advised by pharmacist to discontinue taking medication. Daughter also stated that since her mother discontinued medication, her blood sugar has gone up. Today at 12 PM her blood sugar reading was 151.     Please advise

## 2018-08-06 NOTE — TELEPHONE ENCOUNTER
Spoke with daughter to inform that PCP sent in a new prescription to pharmacy. Neville Hickey stated understanding.

## 2018-08-20 ENCOUNTER — OFFICE VISIT (OUTPATIENT)
Dept: FAMILY MEDICINE CLINIC | Age: 83
End: 2018-08-20

## 2018-08-20 VITALS
HEART RATE: 78 BPM | OXYGEN SATURATION: 100 % | SYSTOLIC BLOOD PRESSURE: 144 MMHG | DIASTOLIC BLOOD PRESSURE: 81 MMHG | RESPIRATION RATE: 14 BRPM | HEIGHT: 62 IN | TEMPERATURE: 97.5 F | WEIGHT: 120.2 LBS | BODY MASS INDEX: 22.12 KG/M2

## 2018-08-20 DIAGNOSIS — E11.9 CONTROLLED TYPE 2 DIABETES MELLITUS WITHOUT COMPLICATION, WITHOUT LONG-TERM CURRENT USE OF INSULIN (HCC): ICD-10-CM

## 2018-08-20 DIAGNOSIS — E11.9 TYPE 2 DIABETES MELLITUS WITHOUT COMPLICATION, WITHOUT LONG-TERM CURRENT USE OF INSULIN (HCC): Primary | ICD-10-CM

## 2018-08-20 PROBLEM — E11.21 TYPE 2 DIABETES WITH NEPHROPATHY (HCC): Status: ACTIVE | Noted: 2018-08-20

## 2018-08-20 RX ORDER — ACARBOSE 25 MG/1
25 TABLET ORAL
Qty: 90 TAB | Refills: 1 | Status: SHIPPED | OUTPATIENT
Start: 2018-08-20 | End: 2018-09-20 | Stop reason: DRUGHIGH

## 2018-08-20 NOTE — PROGRESS NOTES
[facility]    CC: F/U for diabetes management    HPI:     DMII:  - Stopped taking Prandin due to developing itchy bumps on her thumbs.   - Had been taking medication for 2 weeks without any issues  - Two days after stopping the medication the rash resolved  - Has been taking acarbose (Prescribed to replace Prandin)  - Reports that it makes her very hungry.  - Does not want to try new medications for her diabetes and wishes to try working on her diet  - Has been drinking sodas and juices  - Reported FBS range of 150s-200s (No log brought in for review)      ROS: Positive items marked in RED  CON: fever, chills  Cardiovascular: palpitations, CP  Resp: SOB, cough  GI: nausea, vomiting, diarrhea  : dysuria, hematuria      Past Medical History:   Diagnosis Date    Diverticulosis     DM type 2 (diabetes mellitus, type 2) ([facility] [address] 2012    Goiter 1994    resolved    Hypercalcemia     nl PTH    Hyperlipidemia 2015    Hypertension 2010    Immunization refused     pt doesn't want any shots       Past Surgical History:   Procedure Laterality Date    HX CATARACT REMOVAL Bilateral 2000    Dr. Varsha Houston  2014     [address]    Patient states Total hysterectomy       Family History   Problem Relation Age of Onset    Cancer Father     Breast Cancer Sister        Social History     Social History    Marital status: UNKNOWN     Spouse name: N/A    Number of children: N/A    Years of education: N/A     Occupational History    retired nurse      Social History Main Topics    Smoking status: Never Smoker    Smokeless tobacco: Never Used    Alcohol use No    Drug use: No    Sexual activity: No      Comment:      Other Topics Concern    None     Social History Narrative       Allergies   Allergen Reactions    Codeine Other (comments)     Felt like a different person, per patient    Iodine Itching    Pcn [Penicillins] Itching    Sulfa (Sulfonamide Antibiotics) Unknown (comments)    Vicodin [Hydrocodone-Acetaminophen] Nausea Only         Current Outpatient Prescriptions:     SITagliptin (JANUVIA) 100 mg tablet, Take 1 Tab by mouth daily. , Disp: 30 Tab, Rfl: 3    acarbose (PRECOSE) 25 mg tablet, Take 1 Tab by mouth three (3) times daily (with meals). , Disp: 90 Tab, Rfl: 1    diphenhydrAMINE (BENADRYL) 50 mg tablet, Take 1 Tab by mouth nightly as needed for Sleep., Disp: 90 Tab, Rfl: 1    famotidine (PEPCID) 20 mg tablet, Take 1 Tab by mouth daily. , Disp: 90 Tab, Rfl: 1    donepezil (ARICEPT) 5 mg tablet, TAKE 1 TAB BY MOUTH NIGHTLY., Disp: 90 Tab, Rfl: 1    atorvastatin (LIPITOR) 20 mg tablet, TAKE 1 TAB BY MOUTH DAILY. , Disp: 90 Tab, Rfl: 1    LORazepam (ATIVAN) 0.5 mg tablet, TAKE 1 TAB BY MOUTH EVERY NIGHT AT BEDTIME, Disp: , Rfl: 0    MULTIVITAMINS W/C PO, Take 1 Tab by mouth daily. , Disp: , Rfl:     Blood-Glucose Meter (TRUE METRIX AIR GLUCOSE METER) [facility], Use as directed to check home blood sugar for DM II E11.9, Disp: 1 Each, Rfl: 0    Blood Glucose Control, Low (TRUE METRIX LEVEL 1) soln, Use as directed to check home blood sugar for DM II E11.9, Disp: 1 Each, Rfl: 12    glucose blood VI test strips (TRUE METRIX GLUCOSE TEST STRIP) strip, TEST BLOOD SUGAR ONE TIME DAILY for DM II E11.9, Disp: 100 Strip, Rfl: 3    lancets (TRUEPLUS LANCETS) 33 gauge misc, Use daily to check blood sugar for DM II E11.9, Disp: 100 Lancet, Rfl: 3    alcohol swabs (BD SINGLE USE SWABS REGULAR) [facility], Use to clean finger to test blood sugar 1 time daily for DM II E11.9., Disp: 100 Pad, Rfl: 3    amLODIPine (NORVASC) 5 mg tablet, Take 5 mg by mouth daily. , Disp: , Rfl:     benazepril (LOTENSIN) 40 mg tablet, Take 1 Tab by mouth daily. , Disp: 90 Tab, Rfl: 1    aspirin delayed-release 81 mg tablet, Take 81 mg by mouth daily. , Disp: , Rfl:     omega-3 fatty acids-vitamin e (FISH OIL) 1,000 mg cap, Take 1 Cap by mouth daily.  One capsule daily. , Disp: 60 Cap, Rfl: 5    repaglinide (PRANDIN) 0.5 mg tablet, Take 1 Tab by mouth Before breakfast, lunch, and dinner., Disp: 90 Tab, Rfl: 4    Physical Exam:      /81 (BP 1 Location: Left arm, BP Patient Position: Sitting)  Pulse 78  Temp 97.5 °F (36.4 °C) (Oral)   Resp 14  Ht 5' 2\" (1.575 m)  Wt 120 lb 3.2 oz (54.5 kg)  SpO2 100%  BMI 21.98 kg/m2    General:  WD, WN, NAD, conversant  HENT: NCAT  Lungs: CTAB, Normal respiratory effort and rate  CV: RRR, no MRGs  ABD: soft, non-tender, non-distended, normal bowel sounds  Ext: no peripheral edema or digital cyanosis noted  Skin: normal color, turgor, texture , temperature  Psych: alert, normal affect  Neuro: Speech normal, moving all extremities, normal facial symmetry      Assessment/Plan     DMII, Inadequately Controlled:  - Inadequate control 2/2 poor medication compliance. Patient with history of stopping medications due to anxiety of developing possible side effects from her medications  - Suspect patient does not have a true allergy to Prandin.  Will stop the medication given her concerns  - Discussed with patient that we are running out of medication options, given her issues with most medications that we prescribe  - Will continue current Acarbose and Januvia regimen  - Patient will work on lifestyle changes  - Advised patient not to drink soda and minimize juice intake  - Instructed to log blood sugar and to bring log to next visit  - Plan to check HGBA1c at next visit  - F/U in 2 months        Martin Angulo MD  8/20/2018, 10:30 AM

## 2018-08-20 NOTE — PROGRESS NOTES
Chief Complaint   Patient presents with    Follow Up Chronic Condition     1. Have you been to the ER, urgent care clinic since your last visit? Hospitalized since your last visit? No    2. Have you seen or consulted any other health care providers outside of the [address] [place] [address] since your last visit? Include any pap smears or colon screening. No     Fall Risk Assessment, last 12 mths 8/20/2018   Able to walk? Yes   Fall in past 12 months?  No   Fall with injury? -   Number of falls in past 12 months -   Fall Risk Score -       Visit Vitals    /81 (BP 1 Location: Left arm, BP Patient Position: Sitting)    Pulse 78    Temp 97.5 °F (36.4 °C) (Oral)    Resp 14    Ht 5' 2\" (1.575 m)    Wt 120 lb 3.2 oz (54.5 kg)    SpO2 100%    BMI 21.98 kg/m2

## 2018-08-20 NOTE — MR AVS SNAPSHOT
[address] [facility] [address] [address] 
[phone] Patient: Andrew Carcamo MRN: [id number] YOT:[birth year] Visit Information Date & Time Provider Department Dept. Phone Encounter #  
 8/20/2018 10:15 AM Natasha Pemberton, [address] [facility] [phone] [id number] Follow-up Instructions Return in about 2 months (around 10/20/2018). Your Appointments 5/7/2019  9:30 AM  
Office Visit with Natasha Pemberton MD  
[place] [address] ([facility]) Appt Note: [address] [facility] [address] [facility] [address] [facility] [address]  
  
   
 [address] [facility] [address] Upcoming Health Maintenance Date Due Pneumococcal 65+ High/Highest Risk (1 of 2 - PCV13) 12/22/1994 EYE EXAM RETINAL OR DILATED Q1 3/28/2017 LIPID PANEL Q1 3/4/2018 GLAUCOMA SCREENING Q2Y 3/28/2018 Influenza Age 5 to Adult 8/1/2018 HEMOGLOBIN A1C Q6M 11/23/2018 FOOT EXAM Q1 5/7/2019 MICROALBUMIN Q1 5/7/2019 MEDICARE YEARLY EXAM 5/8/2019 DTaP/Tdap/Td series (2 - Td) 6/17/2026 Allergies as of 8/20/2018  Review Complete On: 7/18/2018 By: Natasha Pemberton MD  
  
 Severity Noted Reaction Type Reactions Codeine  11/10/2015    Other (comments) Felt like a different person, per patient Iodine  11/10/2015    Itching Pcn [Penicillins]  11/10/2015    Itching Sulfa (Sulfonamide Antibiotics)  11/10/2015    Unknown (comments) Vicodin [Hydrocodone-acetaminophen]  11/10/2015    Nausea Only Current Immunizations  Reviewed on 3/26/2018 No immunizations on file. Not reviewed this visit You Were Diagnosed With   
  
 Codes Comments Type 2 diabetes mellitus without complication, without long-term current use of insulin (HCC)    -  Primary ICD-10-CM: E11.9 ICD-9-CM: 250.00  Controlled type 2 diabetes mellitus without complication, without long-term current use of insulin (HCC)     ICD-10-CM: E11.9 ICD-9-CM: 250.00 Vitals BP Pulse Temp Resp Height(growth percentile) Weight(growth percentile) 144/81 (BP 1 Location: Left arm, BP Patient Position: Sitting) 78 97.5 °F (36.4 °C) (Oral) 14 5' 2\" (1.575 m) 120 lb 3.2 oz (54.5 kg) SpO2 BMI OB Status Smoking Status 100% 21.98 kg/m2 Hysterectomy Never Smoker Vitals History BMI and BSA Data Body Mass Index Body Surface Area  
 21.98 kg/m 2 1.54 m 2 Preferred Pharmacy Pharmacy Name Phone [facility][id number] [phone] Your Updated Medication List  
  
   
This list is accurate as of 8/20/18 10:48 AM.  Always use your most recent med list.  
  
  
  
  
 acarbose 25 mg tablet Commonly known as:  PRECOSE Take 1 Tab by mouth three (3) times daily (with meals). alcohol swabs Padm Commonly known as:  BD Single Use Swabs Regular Use to clean finger to test blood sugar 1 time daily for DM II E11.9. amLODIPine 5 mg tablet Commonly known as:  Frannie Colon Take 5 mg by mouth daily. aspirin delayed-release 81 mg tablet Take 81 mg by mouth daily. atorvastatin 20 mg tablet Commonly known as:  LIPITOR  
TAKE 1 TAB BY MOUTH DAILY. benazepril 40 mg tablet Commonly known as:  LOTENSIN Take 1 Tab by mouth daily. Blood Glucose Control, Low Soln Commonly known as:  TRUE METRIX LEVEL 1 Use as directed to check home blood sugar for DM II E11.9 Blood-Glucose Meter Misc Commonly known as:  TRUE METRIX AIR GLUCOSE METER Use as directed to check home blood sugar for DM II E11.9  
  
 diphenhydrAMINE 50 mg tablet Commonly known as:  BENADRYL Take 1 Tab by mouth nightly as needed for Sleep.  
  
 donepezil 5 mg tablet Commonly known as:  ARICEPT  
TAKE 1 TAB BY MOUTH NIGHTLY. famotidine 20 mg tablet Commonly known as:  PEPCID Take 1 Tab by mouth daily. FISH OIL 1,000 mg Cap Generic drug:  omega-3 fatty acids-vitamin e Take 1 Cap by mouth daily. One capsule daily. glucose blood VI test strips strip Commonly known as:  TRUE METRIX GLUCOSE TEST STRIP  
TEST BLOOD SUGAR ONE TIME DAILY for DM II E11.9  
  
 lancets 33 gauge Misc Commonly known as:  Tamar Bold Use daily to check blood sugar for DM II E11.9 LORazepam 0.5 mg tablet Commonly known as:  ATIVAN  
TAKE 1 TAB BY MOUTH EVERY NIGHT AT BEDTIME MULTIVITAMINS W/C PO Take 1 Tab by mouth daily. SITagliptin 100 mg tablet Commonly known as:  Kunz [place] Take 1 Tab by mouth daily. Prescriptions Sent to Pharmacy Refills SITagliptin (JANUVIA) 100 mg tablet 1 Sig: Take 1 Tab by mouth daily. Class: Normal  
 Pharmacy: [address] [place] [address] Abbey Ramey [address] [facility] Ph #: [phone] Route: Oral  
 acarbose (PRECOSE) 25 mg tablet 1 Sig: Take 1 Tab by mouth three (3) times daily (with meals). Class: Normal  
 Pharmacy: [address] [place] [address] Abbey Ramey [address] [facility] Ph #: [phone] Route: Oral  
  
Follow-up Instructions Return in about 2 months (around 10/20/2018). Introducing [facility]! [employer] introduces [employer] patient portal. Now you can access parts of your medical record, email your doctor's office, and request medication refills online. 1. In your internet browser, go to [email or web address] 2. Click on the First Time User? Click Here link in the Sign In box. You will see the New Member Sign Up page. 3. Enter your [employer] Access Code exactly as it appears below. You will not need to use this code after youve completed the sign-up process. If you do not sign up before the expiration date, you must request a new code. · [employer] Access Code: [email or web address] Expires: 9/9/2018  7:46 AM 
 
4.  Enter the last four digits of your Social Security Number (xxxx) and Date of Birth (mm/dd/yyyy) as indicated and click Submit. You will be taken to the next sign-up page. 5. Create a [employer] ID. This will be your [employer] login ID and cannot be changed, so think of one that is secure and easy to remember. 6. Create a [employer] password. You can change your password at any time. 7. Enter your Password Reset Question and Answer. This can be used at a later time if you forget your password. 8. Enter your e-mail address. You will receive e-mail notification when new information is available in [address] 9. Click Sign Up. You can now view and download portions of your medical record. 10. Click the Download Summary menu link to download a portable copy of your medical information. If you have questions, please visit the Frequently Asked Questions section of the [employer] website. Remember, [employer] is NOT to be used for urgent needs. For medical emergencies, dial 911. Now available from your iPhone and Android! Please provide this summary of care documentation to your next provider. Your primary care clinician is listed as Gianni Rocha. If you have any questions after today's visit, please call [phone].

## 2018-09-13 ENCOUNTER — APPOINTMENT (OUTPATIENT)
Dept: NUTRITION | Age: 83
End: 2018-09-13

## 2018-09-20 ENCOUNTER — OFFICE VISIT (OUTPATIENT)
Dept: FAMILY MEDICINE CLINIC | Age: 83
End: 2018-09-20

## 2018-09-20 VITALS
HEART RATE: 93 BPM | BODY MASS INDEX: 21.64 KG/M2 | TEMPERATURE: 97.2 F | OXYGEN SATURATION: 100 % | WEIGHT: 117.6 LBS | SYSTOLIC BLOOD PRESSURE: 115 MMHG | DIASTOLIC BLOOD PRESSURE: 68 MMHG | RESPIRATION RATE: 16 BRPM | HEIGHT: 62 IN

## 2018-09-20 DIAGNOSIS — R63.30 FEEDING DIFFICULTIES: ICD-10-CM

## 2018-09-20 DIAGNOSIS — I10 ESSENTIAL HYPERTENSION: ICD-10-CM

## 2018-09-20 DIAGNOSIS — E11.9 TYPE 2 DIABETES MELLITUS WITHOUT COMPLICATION, WITHOUT LONG-TERM CURRENT USE OF INSULIN (HCC): Primary | ICD-10-CM

## 2018-09-20 RX ORDER — ACARBOSE 50 MG/1
50 TABLET ORAL
Qty: 90 TAB | Refills: 0 | Status: SHIPPED | OUTPATIENT
Start: 2018-09-20 | End: 2018-10-22 | Stop reason: DRUGHIGH

## 2018-09-20 RX ORDER — AMLODIPINE BESYLATE 5 MG/1
5 TABLET ORAL DAILY
Qty: 90 TAB | Refills: 1 | Status: SHIPPED | OUTPATIENT
Start: 2018-09-20 | End: 2018-12-18 | Stop reason: ALTCHOICE

## 2018-09-20 NOTE — PROGRESS NOTES
[address] CC: Feeding Concerns HPI:  
 
Feeding Concerns: 
- Daughter is concerned that her mother has not been eating well - Eating 3-4 times a day, but she is concerned about how small her portions are - Reports 5 pound weight loss since the beginning of the year - Patient reports that her decreased appetite is due to feeling like she will throw up if she eats larger portions - Patient feels that she will have an easier time eating if she were to be given nutritional shakes instead - Patient is able to feed herself DMII: 
- Taking diabetes medication as prescribed - Denies any side effects or issues with her medication - Blood sugar is being checked at home. Log brought in for review. 
- FBS range of 148-248 HTN: 
- Taking BP medication as prescribed - Denies any side effects or issue with her BP medication - BP is being checked at home ROS: Positive items marked in RED 
CON: fever, chills Cardiovascular: palpitations, CP Resp: SOB, cough GI: vomiting, diarrhea : dysuria, hematuria Past Medical History:  
Diagnosis Date  Diverticulosis  DM type 2 (diabetes [facility] 2[facility] 2012 [address]  
 resolved  Hypercalcemia   
 nl PTH  Hyperlipidemia 2015  Hypertension 2010  Immunization refused   
 pt doesn't want any shots Past Surgical History:  
Procedure Laterality Date  HX CATARACT REMOVAL Bilateral 2000 Dr. Kayla Garcia  HX COLONOSCOPY  2014 [address] Patient states Total hysterectomy Family History Problem Relation Age of Onset  Cancer Father  Breast Cancer Sister Social History Social History  Marital status: UNKNOWN Spouse name: N/A  
 Number of children: N/A  
 Years of education: N/A Occupational History  retired nurse Social History Main Topics  Smoking status: Never Smoker  Smokeless tobacco: Never Used  Alcohol use No  
 Drug use: No  
 Sexual activity: No  
   Comment:  Other Topics Concern  None Social History Narrative Allergies Allergen Reactions  Codeine Other (comments) Felt like a different person, per patient  Iodine Itching  Pcn [Penicillins] Itching  Sulfa (Sulfonamide Antibiotics) Unknown (comments)  Vicodin [Hydrocodone-Acetaminophen] Nausea Only Current Outpatient Prescriptions:  
  SITagliptin (JANUVIA) 100 mg tablet, Take 1 Tab by mouth daily. , Disp: 90 Tab, Rfl: 1 
  acarbose (PRECOSE) 25 mg tablet, Take 1 Tab by mouth three (3) times daily (with meals). , Disp: 90 Tab, Rfl: 1 
  diphenhydrAMINE (BENADRYL) 50 mg tablet, Take 1 Tab by mouth nightly as needed for Sleep., Disp: 90 Tab, Rfl: 1 
  famotidine (PEPCID) 20 mg tablet, Take 1 Tab by mouth daily. , Disp: 90 Tab, Rfl: 1 
  donepezil (ARICEPT) 5 mg tablet, TAKE 1 TAB BY MOUTH NIGHTLY., Disp: 90 Tab, Rfl: 1 
  atorvastatin (LIPITOR) 20 mg tablet, TAKE 1 TAB BY MOUTH DAILY. , Disp: 90 Tab, Rfl: 1 
  LORazepam (ATIVAN) 0.5 mg tablet, TAKE 1 TAB BY MOUTH EVERY NIGHT AT BEDTIME, Disp: , Rfl: 0 
  MULTIVITAMINS W/C PO, Take 1 Tab by mouth daily. , Disp: , Rfl:   Blood-Glucose Meter (TRUE METRIX AIR GLUCOSE METER) [facility], Use as directed to check home blood sugar for DM II E11.9, Disp: 1 Each, Rfl: 0 
  Blood Glucose Control, Low (TRUE METRIX LEVEL 1) soln, Use as directed to check home blood sugar for DM II E11.9, Disp: 1 Each, Rfl: 12 
  glucose blood VI test strips (TRUE METRIX GLUCOSE TEST STRIP) strip, TEST BLOOD SUGAR ONE TIME DAILY for DM II E11.9, Disp: 100 Strip, Rfl: 3 
  lancets (TRUEPLUS LANCETS) 33 gauge misc, Use daily to check blood sugar for DM II E11.9, Disp: 100 Lancet, Rfl: 3 
  alcohol swabs (BD SINGLE USE SWABS REGULAR) padm, Use to clean finger to test blood sugar 1 time daily for DM II E11.9., Disp: 100 Pad, Rfl: 3   amLODIPine (NORVASC) 5 mg tablet, Take 5 mg by mouth daily. , Disp: , Rfl:  
  benazepril (LOTENSIN) 40 mg tablet, Take 1 Tab by mouth daily. , Disp: 90 Tab, Rfl: 1 
  aspirin delayed-release 81 mg tablet, Take 81 mg by mouth daily. , Disp: , Rfl:  
  omega-3 fatty acids-vitamin e (FISH OIL) 1,000 mg cap, Take 1 Cap by mouth daily. One capsule daily. , Disp: 60 Cap, Rfl: 5 Physical Exam:  
  
/68 (BP 1 Location: Left arm, BP Patient Position: Sitting)  Pulse 93  Temp 97.2 °F (36.2 °C) (Oral)   Resp 16  Ht 5' 2\" (1.575 m)  Wt 117 lb 9.6 oz (53.3 kg)  SpO2 100%  BMI 21.51 kg/m2 General:  WD, WN, NAD, conversant Eyes: sclera clear bilaterally, no discharge noted, eyelids normal in appearance HENT: NCAT Lungs: CTAB, normal respiratory effort and rate CV: RRR, no MRGs ABD: soft, non-tender, non-distended, normal bowel sounds Ext: no peripheral edema or digital cyanosis noted Skin: normal temperature, turgor, color, and texture Psych: alert and oriented to person, place and time, normal affect Neuro: speech normal, moving all extremities, gait normal 
 
 
Assessment/Plan Feeding Difficulties: 
- Decreased appetite is likely 2/2 her dementia - Patient weight loss has been minimal and her BMI is still in a good range - Will attempt to start her on supplemental shakes - Will closely monitor her weight - F/U as needed DMII, Inadequately Controlled: - FBS not at goal of <130 
- Will increase acarbose dose - Follow up in one month HTN, Well Controlled: - At goal BP of <130/80 
- Will continue current BP regimen - Follow up in one month Sowmya Hilliard MD 
9/20/2018, 9:50 AM

## 2018-09-20 NOTE — MR AVS SNAPSHOT
[address] [facility] [address] [id number] 
[phone] Patient: Key Adams MRN: [id number] JYU:[id number] Visit Information Date & Time Provider Department Dept. Phone Encounter #  
 9/20/2018  9:30 AM Siri Juan, [facility] [phone] [id number] Your Appointments 10/22/2018 10:30 AM  
Follow Up with MD Bianca Muller 23 ([address]) Appt Note: 2 mo f/u  
 [facility] [address]  
  
   
 [address]  
  
    
 5/7/2019  9:30 AM  
Office Visit with MD Bianca Muller 23 [address] [facility]) Appt Note: [address] [facility] [address] [facility] [address] [facility] [address]  
  
   
 [address] Upcoming Health Maintenance Date Due Pneumococcal 65+ High/Highest Risk (1 of 2 - PCV13) 12/22/1994 EYE EXAM RETINAL OR DILATED Q1 3/28/2017 LIPID PANEL Q1 3/4/2018 GLAUCOMA SCREENING Q2Y 3/28/2018 Influenza Age 5 to Adult 8/1/2018 HEMOGLOBIN A1C Q6M 11/23/2018 FOOT EXAM Q1 5/7/2019 MICROALBUMIN Q1 5/7/2019 MEDICARE YEARLY EXAM 5/8/2019 DTaP/Tdap/Td series (2 - Td) 6/17/2026 Allergies as of 9/20/2018  Review Complete On: 9/20/2018 By: Dez Santoyo LPN Severity Noted Reaction Type Reactions Codeine  11/10/2015    Other (comments) Felt like a different person, per patient Iodine  11/10/2015    Itching Pcn [Penicillins]  11/10/2015    Itching Sulfa (Sulfonamide Antibiotics)  11/10/2015    Unknown (comments) Vicodin [Hydrocodone-acetaminophen]  11/10/2015    Nausea Only Current Immunizations  Reviewed on 3/26/2018 No immunizations on file. Not reviewed this visit You Were Diagnosed With   
  
 Codes Comments Type 2 diabetes mellitus without complication, without long-term current use of insulin (HCC)    -  Primary ICD-10-CM: E11.9 ICD-9-CM: 250.00 Essential hypertension     ICD-10-CM: I10 
ICD-9-CM: 401.9 Feeding difficulties     ICD-10-CM: R63.3 ICD-9-CM: 954. 3 Vitals BP Pulse Temp Resp Height(growth percentile) Weight(growth percentile)  
 115/68 (BP 1 Location: Left arm, BP Patient Position: Sitting) 93 97.2 °F (36.2 °C) (Oral) 16 5' 2\" (1.575 m) 117 lb 9.6 oz (53.3 kg) SpO2 BMI OB Status Smoking Status 100% 21.51 kg/m2 Hysterectomy Never Smoker Vitals History BMI and BSA Data Body Mass Index Body Surface Area  
 21.51 kg/m 2 1.53 m 2 Preferred Pharmacy Pharmacy Name Phone [id number] [phone] [phone] Your Updated Medication List  
  
   
This list is accurate as of 9/20/18 10:15 AM.  Always use your most recent med list.  
  
  
  
  
 acarbose 50 mg tablet Commonly known as:  PRECOSE Take 1 Tab by mouth three (3) times daily (with meals). alcohol swabs Padm Commonly known as:  BD Single Use Swabs Regular Use to clean finger to test blood sugar 1 time daily for DM II E11.9. amLODIPine 5 mg tablet Commonly known as:  Frannie Colon Take 1 Tab by mouth daily. aspirin delayed-release 81 mg tablet Take 81 mg by mouth daily. atorvastatin 20 mg tablet Commonly known as:  LIPITOR  
TAKE 1 TAB BY MOUTH DAILY. benazepril 40 mg tablet Commonly known as:  LOTENSIN Take 1 Tab by mouth daily. Blood Glucose Control, Low Soln Commonly known as:  TRUE METRIX LEVEL 1 Use as directed to check home blood sugar for DM II E11.9 Blood-Glucose Meter Misc Commonly known as:  TRUE METRIX AIR GLUCOSE METER Use as directed to check home blood sugar for DM II E11.9  
  
 diphenhydrAMINE 50 mg tablet Commonly known as:  BENADRYL Take 1 Tab by mouth nightly as needed for Sleep.  
  
 donepezil 5 mg tablet Commonly known as:  ARICEPT  
TAKE 1 TAB BY MOUTH NIGHTLY. famotidine 20 mg tablet Commonly known as:  PEPCID Take 1 Tab by mouth daily. FISH OIL 1,000 mg Cap Generic drug:  omega-3 fatty acids-vitamin e Take 1 Cap by mouth daily. One capsule daily. glucose blood VI test strips strip Commonly known as:  TRUE METRIX GLUCOSE TEST STRIP  
TEST BLOOD SUGAR ONE TIME DAILY for DM II E11.9  
  
 lancets 33 gauge Misc Commonly known as:  Christian Blackwell Use daily to check blood sugar for DM II E11.9 LORazepam 0.5 mg tablet Commonly known as:  ATIVAN  
TAKE 1 TAB BY MOUTH EVERY NIGHT AT BEDTIME MULTIVITAMINS W/C PO Take 1 Tab by mouth daily. SITagliptin 100 mg tablet Commonly known as:  Shana Stake Take 1 Tab by mouth daily. Prescriptions Sent to Pharmacy Refills  
 acarbose (PRECOSE) 50 mg tablet 0 Sig: Take 1 Tab by mouth three (3) times daily (with meals). Class: Normal  
 Pharmacy: [address] [place] [address] Ph #: [phone] Route: Oral  
 amLODIPine (NORVASC) 5 mg tablet 1 Sig: Take 1 Tab by mouth daily. Class: Normal  
 Pharmacy: [address] [place] [address] Ph #: [phone] Route: Oral  
  
To-Do List   
 10/04/2018 11:30 AM  
  Appointment with Harini Veliz RD at [facility] ([phone]) Introducing [facility]! [facility] introduces [employer] patient portal. Now you can access parts of your medical record, email your doctor's office, and request medication refills online. 1. In your internet browser, go to [email or web address] 2. Click on the First Time User? Click Here link in the Sign In box. You will see the New Member Sign Up page. 3. Enter your [employer] Access Code exactly as it appears below.  You will not need to use this code after youve completed the sign-up process. If you do not sign up before the expiration date, you must request a new code. · [employer]oints Access Code: [email or web address] Expires: 12/9/2018 10:47 AM 
 
4. Enter the last four digits of your Social Security Number (xxxx) and Date of Birth (mm/dd/yyyy) as indicated and click Submit. You will be taken to the next sign-up page. 5. Create a Handipoints ID. This will be your Handipoints login ID and cannot be changed, so think of one that is secure and easy to remember. 6. Create a [employer]oint[employer] password. You can change your password at any time. 7. Enter your Password Reset Question and Answer. This can be used at a later time if you forget your password. 8. Enter your e-mail address. You will receive e-mail notification when new information is available in [address] 9. Click Sign Up. You can now view and download portions of your medical record. 10. Click the Download Summary menu link to download a portable copy of your medical information. If you have questions, please visit the Frequently Asked Questions section of the [employer] website. Remember, [employer]oints is NOT to be used for urgent needs. For medical emergencies, dial 911. Now available from your iPhone and Android! Please provide this summary of care documentation to your next provider. Your primary care clinician is listed as Cindy Gutierrez. If you have any questions after today's visit, please call [phone].

## 2018-09-20 NOTE — PROGRESS NOTES
Chief Complaint Patient presents with  Medication Refill  Form Completion Home Care Delivered Form needs to be completed. 1. Have you been to the ER, urgent care clinic since your last visit? Hospitalized since your last visit? No 
 
2. Have you seen or consulted any other health care providers outside of the [address] [place] [address] since your last visit? Include any pap smears or colon screening. Had sleep study done 1 month ago. Fall Risk Assessment, last 12 mths 9/20/2018 Able to walk? Yes Fall in past 12 months? No  
Fall with injury? -  
Number of falls in past 12 months - Fall Risk Score -  
 
PHQ over the last two weeks 9/20/2018 Little interest or pleasure in doing things Nearly every day Feeling down, depressed, irritable, or hopeless Nearly every day Total Score PHQ 2 6 Visit Vitals  /68 (BP 1 Location: Left arm, BP Patient Position: Sitting)  Pulse 93  Temp 97.2 °F (36.2 °C) (Oral)  Resp 16  
 Ht 5' 2\" (1.575 m)  Wt 117 lb 9.6 oz (53.3 kg)  SpO2 100%  BMI 21.51 kg/m2

## 2018-10-04 ENCOUNTER — HOSPITAL ENCOUNTER (OUTPATIENT)
Dept: NUTRITION | Age: 83
Discharge: HOME | End: 2018-10-04
Payer: MEDICARE

## 2018-10-04 PROCEDURE — 97802 MEDICAL NUTRITION INDIV IN: CPT

## 2018-10-04 NOTE — PROGRESS NOTES
[address] Nutrition Services  [address] [facility], [place] [address]  Phone: [phone]  Fax: [phone]   Nutrition Assessment - Medical Nutrition Therapy   Outpatient Initial Evaluation         Patient Name: Annelise Jara : 1929   Treatment Diagnosis: DM2   Referral Source: Eliecer Barney MD Start of Care [facility]): 10/4/2018     Gender: female Age: 80 y.o. Ht: 63 in Wt:  116 lb  kg   BMI: 20.5 BMR   Male  BMR Female 925     Past Medical History includes: DM2, HTN, HLD, hearing impaired     Pertinent Medications:   Acarbose, Januvia   Biochemical Data:   Lab Results   Component Value Date/Time    Hemoglobin A1c 7.0 (H) 2016 08:32 AM    Hemoglobin A1c (POC) 7.8 2018 10:38 AM     Lab Results   Component Value Date/Time    Sodium 139 2018 05:10 PM    Potassium 4.4 2018 05:10 PM    Chloride 102 2018 05:10 PM    CO2 26 2018 05:10 PM    Anion gap 11 2018 05:10 PM    Glucose 176 (H) 2018 05:10 PM    BUN 31 (H) 2018 05:10 PM    Creatinine 1.08 2018 05:10 PM    BUN/Creatinine ratio 29 (H) 2018 05:10 PM    GFR est AA 58 (L) 2018 05:10 PM    GFR est non-AA 48 (L) 2018 05:10 PM    Calcium 10.2 (H) 2018 05:10 PM    Bilirubin, total 0.3 2018 05:10 PM    AST (SGOT) 19 2018 05:10 PM    Alk.  phosphatase 135 (H) 2018 05:10 PM    Protein, total 7.9 2018 05:10 PM    Albumin 4.2 2018 05:10 PM    Globulin 3.7 2018 05:10 PM    A-G Ratio 1.1 2018 05:10 PM    ALT (SGPT) 30 2018 05:10 PM     Lab Results   Component Value Date/Time    Cholesterol, total 214 (H) 2017 09:31 AM    HDL Cholesterol 57 2017 09:31 AM    LDL, calculated 117 (H) 2017 09:31 AM    VLDL, calculated 40 2017 09:31 AM    Triglyceride 198 (H) 2017 09:31 AM     Lab Results   Component Value Date/Time    ALT (SGPT) 30 2018 05:10 PM    AST (SGOT) 2018 05:10 PM    Alk. phosphatase 135 (H) 03/16/2018 05:10 PM    Bilirubin, total 0.3 03/16/2018 05:10 PM     Lab Results   Component Value Date/Time    Creatinine 1.08 03/16/2018 05:10 PM     Lab Results   Component Value Date/Time    BUN 31 (H) 03/16/2018 05:10 PM     Lab Results   Component Value Date/Time    Microalb/Creat ratio (ug/mg creat.) 18.4 05/07/2018 01:30 AM        Subjective/Assessment:   79 yo female here with daughter, referred for help with blood sugar control. Daughter states she has been testing pt fasting BG since May and it hasn't come down even with increases in medication. She will continue to test and log FBG after making dietary changes. Pt appetite is present, states she feels hungry within 1-2 hours of eating a meal. Seems daughter and pt nurse may be restricting foods too much d/t not knowing what to feed her. Pt states she is fatigued which could be a symptom of high BG, but also she is not sleeping at night d/t getting up to use the bathroom almost every hour. Current Eating Patterns: Pt eats 3 meals and 3 snacks per day, but they seem to lack balance. Her daughter has been trying to cut out starches, but pt is still getting other sources of carbs from juices, smoothies, fruits. Most of pt's meals consist of a protein source and a vegetable, while snacks are only carbohydrates. They are trying to get her to drink more water, but still give her apple and cranberry juice. They have started diluting juices. Estimate Needs   Calories: 1600  Protein: 100 Carbs: 180 Fat: 53   Kcal/day  g/day  g/day  g/day        percent: 25  45  30               Education & Recommendations provided: Educated pt on the pathophysiology of Type II Diabetes, insulin resistance and the rationale for dietary modifications and increased activity. Educated pt on carbohydrate food sources, counting carbohydrates, label reading, meal timing, and appropriate serving sizes.  Encouraged pt to avoid sugary beverages, focus on balanced meal model and carbohydrate ranges. Handouts Provided: [x]  Carbohydrates  [x]  Protein  [x]  Non-starchy Vegatbles  [x]  Food Label  [x]  Meal and Snack Ideas  []  Food Journals [x]  Diabetes  []  Cholesterol  []  Sodium  []  Gen Nutr Guidelines  []  SBGM Guidelines  []  Others:   Information Reviewed with: Pt & daughter   Readiness to Change Stage: []  Pre-contemplative    []  Contemplative  []  Preparation               [x]  Action                  []  Maintenance   Potential Barriers to Learning: []  Decline in memory    []  Language barrier   [x]  Other: sleepy  []  Emotional                  []  Limited mobility  []  Lack of motivation     [x] Vision, hearing or cognitive impairment   Expected Compliance: Fair d/t lots of new information     Nutritional Goal - To promote lifestyle changes to result in:    []  Weight loss  [x]  Improved diabetic control  []  Decreased cholesterol levels  []  Decreased blood pressure  []  Weight maintenance []  Preventing any interactions associated with food allergies  [x]  Adequate weight gain toward goal weight  []  Other:        Patient Goals:   1. Pair carbohydrates with protein whenever possible. 2. Encouraged pt to drink only calorie free or very low calorie/carb beverages. 3. Start reading labels for total carbohydrates and try to stay between 30-40g per meal and 15-25g per snack.       Dietitian Signature: Patrick Matos MS, RD Date: 10/4/2018   Follow-up: Fri. 11/16/18 at 9:30a Time: 12:50 PM

## 2018-10-22 ENCOUNTER — OFFICE VISIT (OUTPATIENT)
Dept: FAMILY MEDICINE CLINIC | Age: 83
End: 2018-10-22

## 2018-10-22 VITALS
RESPIRATION RATE: 30 BRPM | TEMPERATURE: 97.9 F | HEART RATE: 83 BPM | SYSTOLIC BLOOD PRESSURE: 116 MMHG | DIASTOLIC BLOOD PRESSURE: 67 MMHG | BODY MASS INDEX: 22.08 KG/M2 | HEIGHT: 62 IN | OXYGEN SATURATION: 99 % | WEIGHT: 120 LBS

## 2018-10-22 DIAGNOSIS — E11.9 TYPE 2 DIABETES MELLITUS WITHOUT COMPLICATION, WITHOUT LONG-TERM CURRENT USE OF INSULIN (HCC): ICD-10-CM

## 2018-10-22 LAB — HBA1C MFR BLD HPLC: 7.5 %

## 2018-10-22 RX ORDER — ACARBOSE 50 MG/1
50 TABLET ORAL
Qty: 90 TAB | Refills: 0 | Status: CANCELLED | OUTPATIENT
Start: 2018-10-22

## 2018-10-22 RX ORDER — ACARBOSE 100 MG/1
100 TABLET ORAL
Qty: 270 TAB | Refills: 1 | Status: SHIPPED | OUTPATIENT
Start: 2018-10-22 | End: 2019-04-15 | Stop reason: SDUPTHER

## 2018-10-22 NOTE — PROGRESS NOTES
[address] CC: F/U of diabetes HPI:  
- Saw nutritionist 
- Trying to follow dietary recommendations - Does not like recommended greek yogurt - Taking medications as prescribed - No reported issues with her medication - Checking blood sugar at home 
- Home FBS range of 134-223 ROS: Positive items marked in RED 
CON: fever, chills Cardiovascular: palpitations, CP Resp: SOB, cough GI: nausea, vomiting, diarrhea : dysuria, hematuria Past Medical History:  
Diagnosis Date  Diverticulosis  DM type 2 [facility] [address]  
 resolved  Hypercalcemia   
 nl PTH  Hyperlipidemia 2015  Hypertension 2010  Immunization refused   
 pt doesn't want any shots Past Surgical History:  
Procedure Laterality Date  HX CATARACT REMOVAL Bilateral 2000 Dr. Renee Campbell  HX COLONOSCOPY  2014 [address] Patient states Total hysterectomy Family History Problem Relation Age of Onset  Cancer Father  Breast Cancer Sister Social History Socioeconomic History  Marital status: UNKNOWN Spouse name: Not on file  Number of children: Not on file  Years of education: Not on file  Highest education level: Not on file Social Needs  Financial resource strain: Not on file  Food insecurity - worry: Not on file  Food insecurity - inability: Not on file  Transportation needs - medical: Not on file  Transportation needs - non-medical: Not on file Occupational History  Occupation: retired nurse Tobacco Use  Smoking status: Never Smoker  Smokeless tobacco: Never Used Substance and Sexual Activity  Alcohol use: No  
  Alcohol/week: 0.0 oz  Drug use: No  
 Sexual activity: No  
  Partners: Male Comment:  Other Topics Concern  Not on file Social History Narrative  Not on file Allergies Allergen Reactions  Codeine Other (comments) Felt like a different person, per patient  Iodine Itching  Pcn [Penicillins] Itching  Sulfa (Sulfonamide Antibiotics) Unknown (comments)  Vicodin [Hydrocodone-Acetaminophen] Nausea Only Current Outpatient Medications:  
  acarbose (PRECOSE) 50 mg tablet, Take 1 Tab by mouth three (3) times daily (with meals). , Disp: 90 Tab, Rfl: 0 
  amLODIPine (NORVASC) 5 mg tablet, Take 1 Tab by mouth daily. , Disp: 90 Tab, Rfl: 1 
  SITagliptin (JANUVIA) 100 mg tablet, Take 1 Tab by mouth daily. , Disp: 90 Tab, Rfl: 1 
  famotidine (PEPCID) 20 mg tablet, Take 1 Tab by mouth daily. , Disp: 90 Tab, Rfl: 1 
  donepezil (ARICEPT) 5 mg tablet, TAKE 1 TAB BY MOUTH NIGHTLY., Disp: 90 Tab, Rfl: 1 
  atorvastatin (LIPITOR) 20 mg tablet, TAKE 1 TAB BY MOUTH DAILY. , Disp: 90 Tab, Rfl: 1   MULTIVITAMINS W/C PO, Take 1 Tab by mouth daily. , Disp: , Rfl:   Blood-Glucose Meter (TRUE METRIX AIR GLUCOSE METER) [facility], Use as directed to check home blood sugar for DM II E11.9, Disp: 1 Each, Rfl: 0 
  Blood Glucose Control, Low (TRUE METRIX LEVEL 1) soln, Use as directed to check home blood sugar for DM II E11.9, Disp: 1 Each, Rfl: 12 
  glucose blood VI test strips (TRUE METRIX GLUCOSE TEST STRIP) strip, TEST BLOOD SUGAR ONE TIME DAILY for DM II E11.9, Disp: 100 Strip, Rfl: 3 
  lancets (TRUEPLUS LANCETS) 33 gauge misc, Use daily to check blood sugar for DM II E11.9, Disp: 100 Lancet, Rfl: 3 
  alcohol swabs (BD SINGLE USE SWABS REGULAR) pad, Use to clean finger to test blood sugar 1 time daily for DM II E11.9., Disp: 100 Pad, Rfl: 3 
  benazepril (LOTENSIN) 40 mg tablet, Take 1 Tab by mouth daily. , Disp: 90 Tab, Rfl: 1 
  aspirin delayed-release 81 mg tablet, Take 81 mg by mouth daily. , Disp: , Rfl:  
  omega-3 fatty acids-vitamin e (FISH OIL) 1,000 mg cap, Take 1 Cap by mouth daily. One capsule daily. , Disp: 60 Cap, Rfl: 5   diphenhydrAMINE (BENADRYL) 50 mg tablet, Take 1 Tab by mouth nightly as needed for Sleep., Disp: 90 Tab, Rfl: 1 
  LORazepam (ATIVAN) 0.5 mg tablet, TAKE 1 TAB BY MOUTH EVERY NIGHT AT BEDTIME, Disp: , Rfl: 0 Physical Exam:  
  
/67 (BP 1 Location: Left arm, BP Patient Position: Sitting)   Pulse 83   Temp 97.9 °F (36.6 °C) (Oral)   Resp 30   Ht 5' 2\" (1.575 m)   Wt 120 lb (54.4 kg)   SpO2 99%   BMI 21.95 kg/m² General:  NAD, conversant Eyes: sclera clear bilaterally, no discharge noted, eyelids normal in appearance HENT: NCAT Lungs: CTAB, normal respiratory effort and rate CV: RRR, no MRGs ABD: soft, non-tender, non-distended, normal bowel sounds Ext: no peripheral edema or digital cyanosis noted Skin: normal temperature, turgor, color, and texture Psych: alert and oriented to person, place and situation, normal affect Neuro: speech normal, moving all extremities, gait normal 
 
 
Results for Yomaira Ferrer (MRN [id number]): 
 Ref. Range 10/22/2018 11:39 Hemoglobin A1c (POC) Latest Units: % 7.5 Assessment/Plan DMII, Improving: 
- Continues to have many FBS not at goal 
- Near goal HGBA1c of <7.5% - Medication options are limited due to patient's reported intolerance to many previously tried diabetic medications - Will increase acarbose dose to 100 mg - F/U in one month Sandro Fisher MD 
10/22/2018, 11:15 AM

## 2018-10-22 NOTE — PROGRESS NOTES
1. Have you been to the ER, urgent care clinic since your last visit? Hospitalized since your last visit? No 
 
2. Have you seen or consulted any other health care providers outside of the [address] [place] [address] since your last visit? Include any pap smears or colon screening. No  
 
 
 
.

## 2018-11-16 ENCOUNTER — HOSPITAL ENCOUNTER (OUTPATIENT)
Dept: NUTRITION | Age: 83
Discharge: HOME | End: 2018-11-16
Payer: MEDICARE

## 2018-11-16 PROCEDURE — 97803 MED NUTRITION INDIV SUBSEQ: CPT

## 2018-11-16 NOTE — PROGRESS NOTES
NUTRITION - FOLLOW-UP TREATMENT NOTE  Patient Name: Carolina Mattson         Date: 2018  : 1929    YES Patient  Verified  Diagnosis: DM2   In time:   9:30a           Out time:   10a   Total Treatment Time (min):   30     SUBJECTIVE/ASSESSMENT    Changes in medication or medical history? Any new allergies, surgeries or procedures? NO    If yes, update Summary List   Pt in for follow up with daughter; daughter with BG logs which have slightly improved since prior to our assessment in October. Per recall; seems pt may still be consuming too many carbohydrates per meal/snack. Daughter/care staff may still be confused about which foods fit into carbohydrate and protein categories. A large concern today is that pt admits to not liking many of the foods they are providing and refusing to eat them. Pt does not like smoothie, plain greek yogurt, banana added to yogurt, states blueberries are too sweet for her preference. Daughter seems frustrated with trying to buy the right foods and mother not eating them. Daughter may want to work on a meal plan a future visits that could be provided to staff to keep everyone on the same page. Daughter reports they have not been reading food labels. Staff wakes patient up to give her DM meds at 7:30am and give her yogurt + banana to eat with it and then feed her breakfast later. Daughter is encouraging pt to drink smoothie in between lunch and dinner, although pt does not want smoothie. These extra snacks could be a culprit of hyperglycemia; encouraged daughter not to give snacks if pt does not want them, also recommended lower carb/higher protein options. Pt with concern about MVI pill being difficulty for her to take and upsetting her stomach, see recommendations below. Current Wt: 121 Previous Wt: 116 Wt Change: +5     Achievement of Goals: 1. Pair carbohydrates with protein whenever possible. =not met, re-educated  2.  Encouraged pt to drink only calorie free or very low calorie/carb beverages. =not addressed, revisit  3. Start reading labels for total carbohydrates and try to stay between 30-40g per meal and 15-25g per snack. =not met, re-educated         Patient Education:  [x]  Review current plan with patient   []  Other:    Handouts/  Information Provided: [x]  Carbohydrates  [x]  Protein  []  Fiber  []  Serving Sizes  []  Fluids  []  Non-starchy veg []  Diabetes  []  Cholesterol  []  Sodium  []  SBGM  []  Food Journals  [x]  Others: food label     New Patient Goals: 1. Make sure peas go in the starchy section of plate. 2. Have morning DM medications with breakfast instead of having them with an extra snack before breakfast.   3. Continue practicing pairing carbohydrates with protein with foods that pt likes (see snack suggestions listed). 4. Read food labels more often to find total grams of carbohydrates to determine proper portion size. 30-40g per meal and 15-25g per snack. 5. Ask for liquid MVI prescription from doctor or buy over the counter gummy Nature's Made brand.      PLAN    [x]  Continue on current plan []  Follow-up PRN   []  Discharge due to :    [x]  Next appt: Fri. 12/14/18 at 9:30am     Dietitian: Andi Harrison MS, RD    Date: 11/16/2018 Time: 11:54 AM

## 2018-11-19 ENCOUNTER — OFFICE VISIT (OUTPATIENT)
Dept: FAMILY MEDICINE CLINIC | Age: 83
End: 2018-11-19

## 2018-11-19 VITALS
OXYGEN SATURATION: 99 % | RESPIRATION RATE: 16 BRPM | HEIGHT: 62 IN | BODY MASS INDEX: 22.08 KG/M2 | WEIGHT: 120 LBS | DIASTOLIC BLOOD PRESSURE: 80 MMHG | HEART RATE: 75 BPM | SYSTOLIC BLOOD PRESSURE: 133 MMHG | TEMPERATURE: 97.8 F

## 2018-11-19 DIAGNOSIS — E11.9 TYPE 2 DIABETES MELLITUS WITHOUT COMPLICATION, WITHOUT LONG-TERM CURRENT USE OF INSULIN (HCC): Primary | ICD-10-CM

## 2018-11-19 DIAGNOSIS — Z28.21 PNEUMOCOCCAL VACCINATION DECLINED BY PATIENT: ICD-10-CM

## 2018-11-19 DIAGNOSIS — R35.0 URINARY FREQUENCY: ICD-10-CM

## 2018-11-19 LAB
BILIRUB UR QL STRIP: NEGATIVE
GLUCOSE UR-MCNC: ABNORMAL MG/DL
KETONES P FAST UR STRIP-MCNC: NEGATIVE MG/DL
PH UR STRIP: 5.5 [PH] (ref 4.6–8)
PROT UR QL STRIP: NEGATIVE
SP GR UR STRIP: 1.2 (ref 1–1.03)
UA UROBILINOGEN AMB POC: ABNORMAL (ref 0.2–1)
URINALYSIS CLARITY POC: CLEAR
URINALYSIS COLOR POC: YELLOW
URINE BLOOD POC: NEGATIVE
URINE LEUKOCYTES POC: NEGATIVE
URINE NITRITES POC: NEGATIVE

## 2018-11-19 NOTE — PROGRESS NOTES
Eye exam was done on Nov 8 Dr Fei Tomas. 1. Have you been to the ER, urgent care clinic since your last visit? Hospitalized since your last visit? No    2. Have you seen or consulted any other health care providers outside of the [address] [place] [address] since your last visit? Include any pap smears or colon screening.  No     Visit Vitals  Ht 5' 2\" (1.575 m)   Wt 120 lb (54.4 kg)   BMI 21.95 kg/m²

## 2018-11-19 NOTE — PATIENT INSTRUCTIONS
Cholesterol and Triglycerides Tests: About These Tests  What are they? Cholesterol and triglycerides tests measure the amount of fats in your blood. These fats have both \"good\" (HDL) and \"bad\" (LDL) cholesterol. Why are these tests done? These tests are done to help find out your risk of a heart attack and stroke. They can help your doctor find out how well medicine is working for some health problems. How can you prepare for these tests? · Your doctor may tell you to fast before your tests. This means that you do not eat or drink anything except water for 9 to 12 hours before the tests. In most cases, you can take your medicines with water the morning of the test.  · Do not eat high-fat foods the night before the tests. · Do not drink alcohol or do intense exercise the night before the tests. · Be sure to tell your doctor about all the over-the-counter and prescription medicines and herbs or other supplements you take. They can affect the results of these tests. What happens during these tests? A health professional takes a sample of your blood. What else should you know about these tests? Your cholesterol levels can help your doctor find out your risk for having a heart attack or stroke. But it's not just about your cholesterol. Your doctor uses your cholesterol levels plus other things to calculate your risk. These include:  · Your blood pressure. · Whether or not you have diabetes. · Your age, sex, and race. · Whether or not you smoke. You and your doctor can talk about whether you need to lower your risk and what treatment is best for you. Where can you learn more? Go to http://cori-rojas.info/. Enter N886 in the search box to learn more about \"Cholesterol and Triglycerides Tests: About These Tests. \"  Current as of: December 6, 2017  Content Version: 11.8  © 5837-1255 Healthwise, Inc[employer].  Care instructions adapted under license by [employer] (which disclaims liability or warranty for this information). If you have questions about a medical condition or this instruction, always ask your healthcare professional. [address] any warranty or liability for your use of this information.

## 2018-11-19 NOTE — PROGRESS NOTES
[facility]    CC: F/U of diabetes    HPI:     - Had diabetic eye exam on 11/8/2018 by Dr. Trish Councilman  - Has appointment on 1- for F/U glaucoma exam  - Saw nutritionist on 11/16/2018 for diabetic diet consultation  - Has been following dietary recommendations  - Taking diabetic medication as prescribed  - Denies any issues with her medication  - Has been checking blood sugar at home. Log brought in for review.  - FBS range has been 128-229. Most not at goal.      ROS: Positive items marked in RED  CON: fever, chills  Cardiovascular: palpitations, CP  Resp: SOB, cough  GI: nausea, vomiting, diarrhea  : dysuria, hematuria, urinary frequency      Past Medical History:   Diagnosis Date    Diverticulosis     DM type 2 (diabetes mellitus, type 2) ([facility] [address] 2012    Goiter 1994    resolved    Hypercalcemia     nl PTH    Hyperlipidemia 2015    Hypertension 2010    Immunization refused     pt doesn't want any shots       Past Surgical History:   Procedure Laterality Date    HX CATARACT REMOVAL Bilateral 2000    Dr. Peggy Knowles HX COLONOSCOPY  2014     [address]    Patient states Total hysterectomy       Family History   Problem Relation Age of Onset    Cancer Father     Breast Cancer Sister        Social History     Socioeconomic History    Marital status: UNKNOWN     Spouse name: Not on file    Number of children: Not on file    Years of education: Not on file    Highest education level: Not on file   Social Needs    Financial resource strain: Not on file    Food insecurity - worry: Not on file    Food insecurity - inability: Not on file   [employer] needs - medical: Not on file   [employer] needs - non-medical: Not on file   Occupational History    Occupation: retired nurse   Tobacco Use    Smoking status: Never Smoker    Smokeless tobacco: Never Used   Substance and Sexual Activity    Alcohol use:  No Alcohol/week: 0.0 oz    Drug use: No    Sexual activity: No     Partners: Male     Comment:    Other Topics Concern    Not on file   Social History Narrative    Not on file       Allergies   Allergen Reactions    Codeine Other (comments)     Felt like a different person, per patient    Iodine Itching    Pcn [Penicillins] Itching    Sulfa (Sulfonamide Antibiotics) Unknown (comments)    Vicodin [Hydrocodone-Acetaminophen] Nausea Only         Current Outpatient Medications:     acarbose (PRECOSE) 100 mg tablet, Take 1 Tab by mouth three (3) times daily (with meals). , Disp: 270 Tab, Rfl: 1    amLODIPine (NORVASC) 5 mg tablet, Take 1 Tab by mouth daily. , Disp: 90 Tab, Rfl: 1    SITagliptin (JANUVIA) 100 mg tablet, Take 1 Tab by mouth daily. , Disp: 90 Tab, Rfl: 1    famotidine (PEPCID) 20 mg tablet, Take 1 Tab by mouth daily. , Disp: 90 Tab, Rfl: 1    donepezil (ARICEPT) 5 mg tablet, TAKE 1 TAB BY MOUTH NIGHTLY., Disp: 90 Tab, Rfl: 1    atorvastatin (LIPITOR) 20 mg tablet, TAKE 1 TAB BY MOUTH DAILY. , Disp: 90 Tab, Rfl: 1    Blood-Glucose Meter (TRUE METRIX AIR GLUCOSE METER) [facility], Use as directed to check home blood sugar for DM II E11.9, Disp: 1 Each, Rfl: 0    Blood Glucose Control, Low (TRUE METRIX LEVEL 1) soln, Use as directed to check home blood sugar for DM II E11.9, Disp: 1 Each, Rfl: 12    glucose blood VI test strips (TRUE METRIX GLUCOSE TEST STRIP) strip, TEST BLOOD SUGAR ONE TIME DAILY for DM II E11.9, Disp: 100 Strip, Rfl: 3    lancets (TRUEPLUS LANCETS) 33 gauge misc, Use daily to check blood sugar for DM II E11.9, Disp: 100 Lancet, Rfl: 3    alcohol swabs (BD SINGLE USE SWABS REGULAR) pad, Use to clean finger to test blood sugar 1 time daily for DM II E11.9., Disp: 100 Pad, Rfl: 3    benazepril (LOTENSIN) 40 mg tablet, Take 1 Tab by mouth daily. , Disp: 90 Tab, Rfl: 1    aspirin delayed-release 81 mg tablet, Take 81 mg by mouth daily. , Disp: , Rfl:     omega-3 fatty acids-vitamin e (FISH OIL) 1,000 mg cap, Take 1 Cap by mouth daily. One capsule daily. , Disp: 60 Cap, Rfl: 5    MULTIVITAMINS W/C PO, Take 1 Tab by mouth daily. , Disp: , Rfl:     Physical Exam:      /80   Pulse 75   Temp 97.8 °F (36.6 °C) (Oral)   Resp 16   Ht 5' 2\" (1.575 m)   Wt 120 lb (54.4 kg)   SpO2 99%   BMI 21.95 kg/m²     General:  WD, WN, NAD, conversant  Eyes: sclera clear bilaterally, no discharge noted, eyelids normal in appearance  HENT: NCAT  Lungs: CTAB, normal respiratory effort and rate  CV: RRR, no MRGs  ABD: soft, non-tender, non-distended, normal bowel sounds  Skin: normal temperature, turgor, color, and texture  Psych: alert and oriented to person, place and situation, normal affect  Neuro: speech normal, moving all extremities, ambulating with walker    Results for Charmaine Wen (MRN [id number]):   Ref.  Range 11/19/2018 09:52   Color (UA POC) Unknown Yellow   Clarity (UA POC) Unknown Clear   Specific gravity (UA POC) Latest Ref Range: 1.001 - 1.035  1.200 (A)   pH (UA POC) Latest Ref Range: 4.6 - 8.0  5.5   Protein (UA POC) Latest Ref Range: Negative  Negative   Glucose (UA POC) Latest Ref Range: Negative  Trace   Ketones (UA POC) Latest Ref Range: Negative  Negative   Blood (UA POC) Latest Ref Range: Negative  Negative   Bilirubin (UA POC) Latest Ref Range: Negative  Negative   Urobilinogen (UA POC) Latest Ref Range: 0.2 - 1  0.2 mg/dL   Nitrites (UA POC) Latest Ref Range: Negative  Negative   Leukocyte esterase (UA POC) Latest Ref Range: Negative  Negative       Assessment/Plan     DMII, Possibly Inadequately Controlled:  - Reported average FBS not at goal and urine with trace glucose  - Advised to continue working on diet and to take medications as prescribed  - Ophthalmologist office contacted and records requested  - Will check fasting lipid panel  - Plan for HGBA1c at next visit  - Handout given on lipid test  - F/U in 2 months for chronic disease management (Sooner if blood sugars get notably higher)        Rakan Allen MD  11/19/2018, 9:18 AM

## 2018-11-30 ENCOUNTER — TELEPHONE (OUTPATIENT)
Dept: FAMILY MEDICINE CLINIC | Age: 83
End: 2018-11-30

## 2018-11-30 NOTE — TELEPHONE ENCOUNTER
Patients daughter Brain Jackman called and requested if mother can re start Banophen 50 mg that was prescribed to her in 7/2018. Daughter states patient is having trouble sleeping again. Please return call to daughter to advise.     Theresa Hicks [phone]

## 2018-12-03 DIAGNOSIS — G47.00 INSOMNIA, UNSPECIFIED TYPE: Primary | ICD-10-CM

## 2018-12-03 RX ORDER — DIPHENHYDRAMINE HCL 50 MG/1
50 CAPSULE ORAL
Refills: 1 | COMMUNITY
Start: 2018-09-19 | End: 2018-12-03 | Stop reason: SDUPTHER

## 2018-12-03 RX ORDER — DIPHENHYDRAMINE HCL 50 MG/1
50 CAPSULE ORAL
Qty: 90 CAP | Refills: 1 | Status: SHIPPED | OUTPATIENT
Start: 2018-12-03 | End: 2018-12-18 | Stop reason: ALTCHOICE

## 2018-12-14 ENCOUNTER — HOSPITAL ENCOUNTER (OUTPATIENT)
Dept: NUTRITION | Age: 83
Discharge: HOME | End: 2018-12-14
Payer: MEDICARE

## 2018-12-14 PROCEDURE — 97803 MED NUTRITION INDIV SUBSEQ: CPT

## 2018-12-14 NOTE — PROGRESS NOTES
NUTRITION - FOLLOW-UP TREATMENT NOTE  Patient Name: Chris Chaney         Date: 2018  : 1929    YES Patient  Verified  Diagnosis: DM2   In time:   9:30am            Out time:   10am   Total Treatment Time (min):   30     SUBJECTIVE/ASSESSMENT    Changes in medication or medical history? Any new allergies, surgeries or procedures? NO    If yes, update Summary List   Pt in for follow up with daughter; reporting they are doing much better with food choices and blood sugar control. Blood sugars are starting to improve. Care staff has been giving morning meds with breakfast instead of morning snack and it has been working out well. Pt is only having snacks/smoothies if she is hungry in between meals. She does not have snacks between every meal any more. Weight has been maintained at goal. Pt report concern that since Acarbose dose was doubled, she feels like she is starving after she takes it. Current Wt: 122.9 Previous Wt: 121.0 Wt Change: +1.9     Achievement of Goals: 1. Make sure peas go in the starchy section of plate.=met, continue  2. Have morning DM medications with breakfast instead of having them with an extra snack before breakfast. =met, continue  3. Continue practicing pairing carbohydrates with protein with foods that pt likes (see snack suggestions listed). =met, continue  4. Read food labels more often to find total grams of carbohydrates to determine proper portion size. 30-40g per meal and 15-25g per snack. =in progress, continue  5. Ask for liquid MVI prescription from doctor or buy over the counter gummy Nature's Made brand. =not met, continue         Patient Education:  [x]  Review current plan with patient   []  Other:    Handouts/  Information Provided: []  Carbohydrates  []  Protein  []  Fiber  []  Serving Sizes  []  Fluids  []  Non-starchy veg []  Diabetes  []  Cholesterol  []  Sodium  [x]  SBGM  []  Food Journals  [x]  Others: Diabetes plate examples     New Patient Goals: 1. Work on adjusting carbohydrate portion size based on blood sugar reading; if BG is above 200 go easy on carbohydrate portion size. 2. Start testing BG before dinner. 3. Purchase Diet juices or Crystal Light as substitute for regular juice.       PLAN    [x]  Continue on current plan []  Follow-up PRN   []  Discharge due to :    [x]  Next appt: Call for follow up after you get through [facility]     Dietitian: Taina Prado MS, RD    Date: 12/14/2018 Time: 11:55 AM

## 2018-12-18 ENCOUNTER — OFFICE VISIT (OUTPATIENT)
Dept: FAMILY MEDICINE CLINIC | Age: 83
End: 2018-12-18

## 2018-12-18 VITALS
WEIGHT: 124 LBS | HEART RATE: 74 BPM | HEIGHT: 62 IN | RESPIRATION RATE: 18 BRPM | SYSTOLIC BLOOD PRESSURE: 128 MMHG | DIASTOLIC BLOOD PRESSURE: 77 MMHG | TEMPERATURE: 98 F | OXYGEN SATURATION: 98 % | BODY MASS INDEX: 22.82 KG/M2

## 2018-12-18 DIAGNOSIS — I10 ESSENTIAL HYPERTENSION: Primary | ICD-10-CM

## 2018-12-18 DIAGNOSIS — G47.00 INSOMNIA, UNSPECIFIED TYPE: ICD-10-CM

## 2018-12-18 RX ORDER — AMLODIPINE AND BENAZEPRIL HYDROCHLORIDE 5; 40 MG/1; MG/1
1 CAPSULE ORAL DAILY
Qty: 90 CAP | Refills: 1 | Status: SHIPPED | OUTPATIENT
Start: 2018-12-18 | End: 2019-05-07 | Stop reason: SDUPTHER

## 2018-12-18 NOTE — PROGRESS NOTES
[facility]    CC: HTN and Insomnia    HPI:     Insomnia:  - Has been taking medication as prescribed  - Denies any side effects from the medication  - Reports that it is no longer helping her sleep  - Wishes to try something else      HTN:  - Concerned about recent BP medication recall, wishes to have more information  - Wishes to be on fewer medications.  Requests to be started on combination medication  - Denies any issues or side effects with her current BP medication regimen  - Reports that she finds it increasingly more difficult to leave her home to go to office visits and wishes to she a physician that does home visits instead      ROS: Positive items marked in RED  CON: fever, chills  Cardiovascular: palpitations, CP  Resp: SOB, cough  GI: nausea, vomiting, diarrhea      Past Medical History:   Diagnosis Date    Diverticulosis     DM type 2 (diabetes mellitus, type 2) ([facility] [address] 2012    Goiter 1994    resolved    Hypercalcemia     nl PTH    Hyperlipidemia 2015    Hypertension 2010    Immunization refused     pt doesn't want any shots       Past Surgical History:   Procedure Laterality Date    HX CATARACT REMOVAL Bilateral 2000    Dr. Elizabeth Collins  2014     [address]    Patient states Total hysterectomy       Family History   Problem Relation Age of Onset    Cancer Father     Breast Cancer Sister        Social History     Socioeconomic History    Marital status: UNKNOWN     Spouse name: Not on file    Number of children: Not on file    Years of education: Not on file    Highest education level: Not on file   Occupational History    Occupation: retired nurse   Tobacco Use    Smoking status: Never Smoker    Smokeless tobacco: Never Used   Substance and Sexual Activity    Alcohol use: No     Alcohol/week: 0.0 oz    Drug use: No    Sexual activity: No     Partners: Male     Comment:        Allergies Allergen Reactions    Codeine Other (comments)     Felt like a different person, per patient    Iodine Itching    Pcn [Penicillins] Itching    Sulfa (Sulfonamide Antibiotics) Unknown (comments)    Vicodin [Hydrocodone-Acetaminophen] Nausea Only         Current Outpatient Medications:     amLODIPine-benazepril (LOTREL) 5-40 mg per capsule, Take 1 Cap by mouth daily. , Disp: 90 Cap, Rfl: 1    BANOPHEN 50 mg capsule, Take 1 Cap by mouth nightly as needed. , Disp: 90 Cap, Rfl: 1    acarbose (PRECOSE) 100 mg tablet, Take 1 Tab by mouth three (3) times daily (with meals). , Disp: 270 Tab, Rfl: 1    SITagliptin (JANUVIA) 100 mg tablet, Take 1 Tab by mouth daily. , Disp: 90 Tab, Rfl: 1    famotidine (PEPCID) 20 mg tablet, Take 1 Tab by mouth daily. , Disp: 90 Tab, Rfl: 1    donepezil (ARICEPT) 5 mg tablet, TAKE 1 TAB BY MOUTH NIGHTLY., Disp: 90 Tab, Rfl: 1    atorvastatin (LIPITOR) 20 mg tablet, TAKE 1 TAB BY MOUTH DAILY. , Disp: 90 Tab, Rfl: 1    MULTIVITAMINS W/C PO, Take 1 Tab by mouth daily. , Disp: , Rfl:     Blood-Glucose Meter (TRUE METRIX AIR GLUCOSE METER) [facility], Use as directed to check home blood sugar for DM II E11.9, Disp: 1 Each, Rfl: 0    Blood Glucose Control, Low (TRUE METRIX LEVEL 1) soln, Use as directed to check home blood sugar for DM II E11.9, Disp: 1 Each, Rfl: 12    glucose blood VI test strips (TRUE METRIX GLUCOSE TEST STRIP) strip, TEST BLOOD SUGAR ONE TIME DAILY for DM II E11.9, Disp: 100 Strip, Rfl: 3    lancets (TRUEPLUS LANCETS) 33 gauge misc, Use daily to check blood sugar for DM II E11.9, Disp: 100 Lancet, Rfl: 3    alcohol swabs (BD SINGLE USE SWABS REGULAR) [facility], Use to clean finger to test blood sugar 1 time daily for DM II E11.9., Disp: 100 Pad, Rfl: 3    aspirin delayed-release 81 mg tablet, Take 81 mg by mouth daily. , Disp: , Rfl:     omega-3 fatty acids-vitamin e (FISH OIL) 1,000 mg cap, Take 1 Cap by mouth daily. One capsule daily. , Disp: 60 Cap, Rfl: 5    Physical Exam: /77   Pulse 74   Temp 98 °F (36.7 °C) (Oral)   Resp 18   Ht 5' 2\" (1.575 m)   Wt 124 lb (56.2 kg)   SpO2 98%   BMI 22.68 kg/m²     General:  WD, WN, NAD, conversant  Eyes: sclera clear bilaterally, no discharge noted, eyelids normal in appearance  HENT: NCAT  Lungs: CTAB, normal respiratory effort and rate  CV: RRR, no MRGs  ABD: soft, non-tender, non-distended, normal bowel sounds  Skin: normal temperature, turgor, color, and texture  Psych: alert and oriented to person, place and situation, normal affect  Neuro: speech normal, moving all extremities      Assessment/Plan     HTN, Well Controlled:  -At goal BP of less than 130/80  -Discussed with patient that her current blood pressure medications are not part of the recalled BP medications  -Will switch current blood pressure regimen to combination pill (Lotrel)  -Discussed with patient that I do not do home visits. Given information on VPA.   -Advised to contact office if she establishes with a new physician  -F/U at already scheduled appointment, unless she establishes with new primary care physician      Insomnia, Inadequately Controlled:  - Will discontinue Banophen and will start on Unisom  - Follow up as needed        Luis Montes De Oca MD  12/18/2018, 9:17 AM

## 2018-12-18 NOTE — PROGRESS NOTES
1. Have you been to the ER, urgent care clinic since your last visit? Hospitalized since your last visit? No    2. Have you seen or consulted any other health care providers outside of the [address] [place] [address] since your last visit? Include any pap smears or colon screening.  No     Chief Complaint   Patient presents with    Follow-up     medication recall for blood pressure       Visit Vitals  /77   Pulse 74   Temp 98 °F (36.7 °C) (Oral)   Resp 18   Ht 5' 2\" (1.575 m)   Wt 124 lb (56.2 kg)   SpO2 98%   BMI 22.68 kg/m²

## 2018-12-27 DIAGNOSIS — F03.90 DEMENTIA WITHOUT BEHAVIORAL DISTURBANCE, UNSPECIFIED DEMENTIA TYPE: ICD-10-CM

## 2018-12-27 RX ORDER — DONEPEZIL HYDROCHLORIDE 5 MG/1
TABLET, FILM COATED ORAL
Qty: 90 TAB | Refills: 0 | Status: SHIPPED | OUTPATIENT
Start: 2018-12-27 | End: 2019-03-15 | Stop reason: SDUPTHER

## 2019-01-08 ENCOUNTER — HOME HEALTH ADMISSION (OUTPATIENT)
Dept: HOME HEALTH SERVICES | Facility: HOME HEALTH | Age: 84
End: 2019-01-08
Payer: MEDICARE

## 2019-01-09 ENCOUNTER — HOME CARE VISIT (OUTPATIENT)
Dept: HOME HEALTH SERVICES | Facility: HOME HEALTH | Age: 84
End: 2019-01-09

## 2019-01-12 LAB
CHOLEST SERPL-MCNC: 132 MG/DL (ref 100–199)
HDLC SERPL-MCNC: 71 MG/DL
INTERPRETATION, 910389: NORMAL
LDLC SERPL CALC-MCNC: 46 MG/DL (ref 0–99)
TRIGL SERPL-MCNC: 75 MG/DL (ref 0–149)
VLDLC SERPL CALC-MCNC: 15 MG/DL (ref 5–40)

## 2019-01-14 ENCOUNTER — HOME CARE VISIT (OUTPATIENT)
Dept: SCHEDULING | Facility: HOME HEALTH | Age: 84
End: 2019-01-14
Payer: MEDICARE

## 2019-01-14 ENCOUNTER — HOME CARE VISIT (OUTPATIENT)
Dept: HOME HEALTH SERVICES | Facility: HOME HEALTH | Age: 84
End: 2019-01-14

## 2019-01-14 VITALS
SYSTOLIC BLOOD PRESSURE: 112 MMHG | OXYGEN SATURATION: 95 % | HEART RATE: 65 BPM | DIASTOLIC BLOOD PRESSURE: 72 MMHG | TEMPERATURE: 97.5 F

## 2019-01-14 PROCEDURE — 3331090002 HH PPS REVENUE DEBIT

## 2019-01-14 PROCEDURE — 400013 HH SOC

## 2019-01-14 PROCEDURE — G0151 HHCP-SERV OF PT,EA 15 MIN: HCPCS

## 2019-01-14 PROCEDURE — 3331090001 HH PPS REVENUE CREDIT

## 2019-01-15 ENCOUNTER — HOME CARE VISIT (OUTPATIENT)
Dept: HOME HEALTH SERVICES | Facility: HOME HEALTH | Age: 84
End: 2019-01-15
Payer: MEDICARE

## 2019-01-15 PROCEDURE — 3331090003 HH PPS REVENUE ADJ

## 2019-01-15 PROCEDURE — 3331090001 HH PPS REVENUE CREDIT

## 2019-01-15 PROCEDURE — 3331090002 HH PPS REVENUE DEBIT

## 2019-01-16 ENCOUNTER — HOME CARE VISIT (OUTPATIENT)
Dept: HOME HEALTH SERVICES | Facility: HOME HEALTH | Age: 84
End: 2019-01-16
Payer: MEDICARE

## 2019-01-18 ENCOUNTER — OFFICE VISIT (OUTPATIENT)
Dept: FAMILY MEDICINE CLINIC | Age: 84
End: 2019-01-18

## 2019-01-18 VITALS
HEIGHT: 62 IN | BODY MASS INDEX: 22.82 KG/M2 | DIASTOLIC BLOOD PRESSURE: 73 MMHG | HEART RATE: 85 BPM | SYSTOLIC BLOOD PRESSURE: 136 MMHG | WEIGHT: 124 LBS | OXYGEN SATURATION: 97 % | RESPIRATION RATE: 18 BRPM | TEMPERATURE: 98.5 F

## 2019-01-18 DIAGNOSIS — E11.9 TYPE 2 DIABETES MELLITUS WITHOUT COMPLICATION, WITHOUT LONG-TERM CURRENT USE OF INSULIN (HCC): ICD-10-CM

## 2019-01-18 DIAGNOSIS — I10 ESSENTIAL HYPERTENSION: Primary | ICD-10-CM

## 2019-01-18 DIAGNOSIS — R35.0 URINARY FREQUENCY: ICD-10-CM

## 2019-01-18 LAB
BILIRUB UR QL STRIP: NEGATIVE
GLUCOSE UR-MCNC: NEGATIVE MG/DL
KETONES P FAST UR STRIP-MCNC: NEGATIVE MG/DL
PH UR STRIP: 5.5 [PH] (ref 4.6–8)
PROT UR QL STRIP: NEGATIVE
SP GR UR STRIP: 1.02 (ref 1–1.03)
UA UROBILINOGEN AMB POC: NORMAL (ref 0.2–1)
URINALYSIS CLARITY POC: CLEAR
URINALYSIS COLOR POC: YELLOW
URINE BLOOD POC: NEGATIVE
URINE LEUKOCYTES POC: NORMAL
URINE NITRITES POC: NEGATIVE

## 2019-01-18 NOTE — PROGRESS NOTES
[facility]    CC: F/U for Chronic Disease Management    HPI:   Has yet to establish with [facility].   Has been put on a waiting list.      Hypertension:  -Taking blood pressure medication as prescribed  -Denies any issues or side effects from the medication  -Has not been checking blood pressure at home  -Patient thinks it is elevated due to not feeling well      DMII:  -Was taking diabetes medication as prescribed  -Denies any side effects from her medication  -Has been checking blood sugar at home  -Was told by pharmacy to stop medication due to low blood sugar  -Log reviewed and no low blood sugars were recorded      Urinary Frequency:  -Has been an issue for a month  -Denies any associated symptoms      ROS: Positive items marked in RED  CON: fever, chills  Cardiovascular: palpitations, CP  Resp: SOB, cough  GI: nausea, vomiting, diarrhea  : dysuria, hematuria      Past Medical History:   Diagnosis Date    Diverticulosis     DM type 2 (diabetes mellitus, type 2) ([facility] [address] 2012    Goiter 1994    resolved    Hypercalcemia     nl PTH    Hyperlipidemia 2015    Hypertension 2010    Immunization refused     pt doesn't want any shots       Past Surgical History:   Procedure Laterality Date    HX CATARACT REMOVAL Bilateral 2000    Dr. Colette Bates HX COLONOSCOPY  2014     [address]    Patient states Total hysterectomy       Family History   Problem Relation Age of Onset    Cancer Father     Breast Cancer Sister        Social History     Socioeconomic History    Marital status: UNKNOWN     Spouse name: Not on file    Number of children: Not on file    Years of education: Not on file    Highest education level: Not on file   Occupational History    Occupation: retired nurse   Tobacco Use    Smoking status: Never Smoker    Smokeless tobacco: Never Used   Substance and Sexual Activity    Alcohol use: No     Alcohol/week: 0.0 oz    Drug use: No    Sexual activity: No     Partners: Male     Comment:        Allergies   Allergen Reactions    Codeine Other (comments)     Felt like a different person, per patient    Iodine Itching    Pcn [Penicillins] Itching    Sulfa (Sulfonamide Antibiotics) Unknown (comments)    Vicodin [Hydrocodone-Acetaminophen] Nausea Only         Current Outpatient Medications:     donepezil (ARICEPT) 5 mg tablet, TAKE 1 TABLET BY MOUTH EVERY DAY AT NIGHT, Disp: 90 Tab, Rfl: 0    amLODIPine-benazepril (LOTREL) 5-40 mg per capsule, Take 1 Cap by mouth daily. , Disp: 90 Cap, Rfl: 1    doxylamine succinate (UNISOM) 25 mg tablet, Take 1 Tab by mouth nightly as needed for Sleep., Disp: 90 Tab, Rfl: 1    acarbose (PRECOSE) 100 mg tablet, Take 1 Tab by mouth three (3) times daily (with meals). , Disp: 270 Tab, Rfl: 1    SITagliptin (JANUVIA) 100 mg tablet, Take 1 Tab by mouth daily. , Disp: 90 Tab, Rfl: 1    famotidine (PEPCID) 20 mg tablet, Take 1 Tab by mouth daily. , Disp: 90 Tab, Rfl: 1    atorvastatin (LIPITOR) 20 mg tablet, TAKE 1 TAB BY MOUTH DAILY. , Disp: 90 Tab, Rfl: 1    MULTIVITAMINS W/C PO, Take 1 Tab by mouth daily. , Disp: , Rfl:     Blood-Glucose Meter (TRUE METRIX AIR GLUCOSE METER) [facility], Use as directed to check home blood sugar for DM II E11.9, Disp: 1 Each, Rfl: 0    Blood Glucose Control, Low (TRUE METRIX LEVEL 1) soln, Use as directed to check home blood sugar for DM II E11.9, Disp: 1 Each, Rfl: 12    glucose blood VI test strips (TRUE METRIX GLUCOSE TEST STRIP) strip, TEST BLOOD SUGAR ONE TIME DAILY for DM II E11.9, Disp: 100 Strip, Rfl: 3    lancets (TRUEPLUS LANCETS) 33 gauge misc, Use daily to check blood sugar for DM II E11.9, Disp: 100 Lancet, Rfl: 3    alcohol swabs (BD SINGLE USE SWABS REGULAR) pad, Use to clean finger to test blood sugar 1 time daily for DM II E11.9., Disp: 100 Pad, Rfl: 3    aspirin delayed-release 81 mg tablet, Take 81 mg by mouth daily. , Disp: , Rfl:     omega-3 fatty acids-vitamin e (FISH OIL) 1,000 mg cap, Take 1 Cap by mouth daily. One capsule daily. , Disp: 60 Cap, Rfl: 5    Physical Exam:      /73   Pulse 85   Temp 98.5 °F (36.9 °C) (Oral)   Resp 18   Ht 5' 2\" (1.575 m)   Wt 124 lb (56.2 kg)   SpO2 97%   BMI 22.68 kg/m²     General:  WD, WN, NAD, conversant  Eyes: sclera clear bilaterally, no discharge noted, eyelids normal in appearance  HENT: NCAT  Lungs: CTAB, normal respiratory effort and rate  CV: RRR, no MRGs  ABD: soft, non-tender, non-distended, normal bowel sounds  Skin: normal temperature, turgor, color, and texture  Psych: alert and oriented to person, place and situation, normal affect  Neuro: speech normal, moving all extremities      Results for Kristan Rick (MRN [id number]):   Ref. Range 1/18/2019 10:11   Color (UA POC) Unknown Yellow   Clarity (UA POC) Unknown Clear   Specific gravity (UA POC) Latest Ref Range: 1.001 - 1.035  1.025   pH (UA POC) Latest Ref Range: 4.6 - 8.0  5.5   Protein (UA POC) Latest Ref Range: Negative  Negative   Glucose (UA POC) Latest Ref Range: Negative  Negative   Ketones (UA POC) Latest Ref Range: Negative  Negative   Blood (UA POC) Latest Ref Range: Negative  Negative   Bilirubin (UA POC) Latest Ref Range: Negative  Negative   Urobilinogen (UA POC) Latest Ref Range: 0.2 - 1  0.2 mg/dL   Nitrites (UA POC) Latest Ref Range: Negative  Negative   Leukocyte esterase (UA POC) Latest Ref Range: Negative  Trace       Assessment/Plan     DMII:  -Will set hemoglobin A1c goal to <8% given her age/dementia  -Reviewed with patient/daughter that blood sugar was not low and was in normal range  -Suspect blood sugar will remain difficult to manage due to her poor/irregular PO and her dislike of all previously tried medications.   -Advised to try the medication again and to work on dietary recommendations  -Plan to check hemoglobin A1c at next visit  -Follow-up in 1 month if she does not establish with [facility]      Hypertension:  -Currently not at goal of blood pressure of less than 130/80  -Previously controlled with current regimen  -BP likely elevated due to not feeling well  -Advised to log home blood pressure  -Follow-up in 1 month if she does not establish with [facility]      Urinary Frequency:  -Recurrent issue.   Suspect patient has overactive bladder.  -Will check urine culture  -Plan to treat as indicated by test results  -Plan to discuss medications for overactive bladder at next appointment if urine culture is unremarkable  -Follow-up if symptoms worsen or fail to improve        Aiden Juarez MD  1/18/2019, 9:42 AM

## 2019-01-18 NOTE — PROGRESS NOTES
1. Have you been to the ER, urgent care clinic since your last visit? Hospitalized since your last visit? No    2. Have you seen or consulted any other health care providers outside of the [address] [place] [address] since your last visit? Include any pap smears or colon screening.  Yes eye specialist 01-08-19     Chief Complaint   Patient presents with    Follow-up     Labs results       Visit Vitals  /73   Pulse 85   Temp 98.5 °F (36.9 °C) (Oral)   Resp 18   Ht 5' 2\" (1.575 m)   Wt 124 lb (56.2 kg)   SpO2 97%   BMI 22.68 kg/m²

## 2019-01-20 LAB — BACTERIA UR CULT: NORMAL

## 2019-01-21 DIAGNOSIS — R41.81 SENILITY: ICD-10-CM

## 2019-01-21 DIAGNOSIS — R54 AGE-RELATED PHYSICAL DEBILITY: Primary | ICD-10-CM

## 2019-01-21 DIAGNOSIS — F03.90 DEMENTIA WITHOUT BEHAVIORAL DISTURBANCE, UNSPECIFIED DEMENTIA TYPE: ICD-10-CM

## 2019-02-07 DIAGNOSIS — E11.9 TYPE 2 DIABETES MELLITUS WITHOUT COMPLICATION, WITHOUT LONG-TERM CURRENT USE OF INSULIN (HCC): ICD-10-CM

## 2019-02-08 RX ORDER — ATORVASTATIN CALCIUM 20 MG/1
TABLET, FILM COATED ORAL
Qty: 90 TAB | Refills: 1 | Status: SHIPPED | OUTPATIENT
Start: 2019-02-08 | End: 2019-07-29 | Stop reason: SDUPTHER

## 2019-02-26 DIAGNOSIS — E11.21 TYPE 2 DIABETES WITH NEPHROPATHY (HCC): Primary | ICD-10-CM

## 2019-03-15 DIAGNOSIS — F03.90 DEMENTIA WITHOUT BEHAVIORAL DISTURBANCE, UNSPECIFIED DEMENTIA TYPE: ICD-10-CM

## 2019-03-15 RX ORDER — DONEPEZIL HYDROCHLORIDE 5 MG/1
TABLET, FILM COATED ORAL
Qty: 90 TAB | Refills: 0 | Status: SHIPPED | OUTPATIENT
Start: 2019-03-15 | End: 2019-04-12 | Stop reason: SDUPTHER

## 2019-03-25 ENCOUNTER — TELEPHONE (OUTPATIENT)
Dept: FAMILY MEDICINE CLINIC | Age: 84
End: 2019-03-25

## 2019-03-25 NOTE — TELEPHONE ENCOUNTER
Patient's daughter called to inform Dr. Elise Baez that her mother's BP has been high for a few days straight.     3/23/19 BP was 159/94  3/24/19 BP was 131/82  3/25/19 BP was 153/94    Asked patient's daughter if she checked her mother's BP before her mother took her BP medication(s). Daughter replied with a \"yes\". Therefore, the above BP readings are before patient takes her BP medication in the morning. Daughter stated that she does not recheck BP after patient takes her medications. Informed daughter that if her mother has not taken her BP med, then her BP will be high until she takes her med. Advised daughter to recheck her mother's BP 30 minutes to an hour after she has taken her BP medication and to contact Dr. Neena Rogers or Nurse if patient's BP is still running high. Patient's daughter verbalized understanding.

## 2019-04-08 DIAGNOSIS — E11.9 TYPE 2 DIABETES MELLITUS WITHOUT COMPLICATION, WITHOUT LONG-TERM CURRENT USE OF INSULIN (HCC): ICD-10-CM

## 2019-04-08 DIAGNOSIS — F03.90 DEMENTIA WITHOUT BEHAVIORAL DISTURBANCE, UNSPECIFIED DEMENTIA TYPE: ICD-10-CM

## 2019-04-12 RX ORDER — DONEPEZIL HYDROCHLORIDE 5 MG/1
TABLET, FILM COATED ORAL
Qty: 90 TAB | Refills: 0 | Status: SHIPPED | OUTPATIENT
Start: 2019-04-12 | End: 2019-09-05 | Stop reason: SDUPTHER

## 2019-04-15 DIAGNOSIS — E11.9 TYPE 2 DIABETES MELLITUS WITHOUT COMPLICATION, WITHOUT LONG-TERM CURRENT USE OF INSULIN (HCC): ICD-10-CM

## 2019-04-16 RX ORDER — ACARBOSE 100 MG/1
100 TABLET ORAL
Qty: 270 TAB | Refills: 1 | Status: SHIPPED | OUTPATIENT
Start: 2019-04-16 | End: 2019-10-10 | Stop reason: SDUPTHER

## 2019-05-03 ENCOUNTER — TELEPHONE (OUTPATIENT)
Dept: FAMILY MEDICINE CLINIC | Age: 84
End: 2019-05-03

## 2019-05-03 NOTE — TELEPHONE ENCOUNTER
Patient called and stated that she has been experiencing nausea after taking acarbose 100 mg. Patient stated that she feels that taking 300 mg total in a day is too much for her. Patient would like to know what to do. Please advise.

## 2019-05-07 ENCOUNTER — OFFICE VISIT (OUTPATIENT)
Dept: FAMILY MEDICINE CLINIC | Age: 84
End: 2019-05-07

## 2019-05-07 VITALS
HEART RATE: 76 BPM | TEMPERATURE: 97 F | HEIGHT: 62 IN | SYSTOLIC BLOOD PRESSURE: 152 MMHG | RESPIRATION RATE: 14 BRPM | BODY MASS INDEX: 23.55 KG/M2 | OXYGEN SATURATION: 96 % | DIASTOLIC BLOOD PRESSURE: 89 MMHG | WEIGHT: 128 LBS

## 2019-05-07 DIAGNOSIS — E11.9 TYPE 2 DIABETES MELLITUS WITHOUT COMPLICATION, WITHOUT LONG-TERM CURRENT USE OF INSULIN (HCC): ICD-10-CM

## 2019-05-07 DIAGNOSIS — Z28.21 PNEUMOCOCCAL VACCINATION DECLINED BY PATIENT: ICD-10-CM

## 2019-05-07 DIAGNOSIS — G47.00 INSOMNIA, UNSPECIFIED TYPE: ICD-10-CM

## 2019-05-07 DIAGNOSIS — K21.9 GASTROESOPHAGEAL REFLUX DISEASE, ESOPHAGITIS PRESENCE NOT SPECIFIED: ICD-10-CM

## 2019-05-07 DIAGNOSIS — F43.21 SITUATIONAL DEPRESSION: ICD-10-CM

## 2019-05-07 DIAGNOSIS — Z00.00 MEDICARE ANNUAL WELLNESS VISIT, SUBSEQUENT: Primary | ICD-10-CM

## 2019-05-07 DIAGNOSIS — I10 ESSENTIAL HYPERTENSION: ICD-10-CM

## 2019-05-07 PROBLEM — F03.90 DEMENTIA WITHOUT BEHAVIORAL DISTURBANCE (HCC): Status: ACTIVE | Noted: 2019-05-07

## 2019-05-07 LAB — HBA1C MFR BLD HPLC: 7.3 %

## 2019-05-07 RX ORDER — FAMOTIDINE 20 MG/1
20 TABLET, FILM COATED ORAL DAILY
Qty: 90 TAB | Refills: 1 | Status: SHIPPED | OUTPATIENT
Start: 2019-05-07 | End: 2019-12-06 | Stop reason: SDUPTHER

## 2019-05-07 RX ORDER — TRAZODONE HYDROCHLORIDE 50 MG/1
50 TABLET ORAL
Qty: 30 TAB | Refills: 2 | Status: SHIPPED | OUTPATIENT
Start: 2019-05-07 | End: 2019-07-29 | Stop reason: SDUPTHER

## 2019-05-07 RX ORDER — AMLODIPINE AND BENAZEPRIL HYDROCHLORIDE 5; 40 MG/1; MG/1
1 CAPSULE ORAL DAILY
Qty: 90 CAP | Refills: 1 | Status: SHIPPED | OUTPATIENT
Start: 2019-05-07 | End: 2019-10-17 | Stop reason: DRUGHIGH

## 2019-05-07 NOTE — PROGRESS NOTES
Chief Complaint Patient presents with [place] Annual Wellness Visit  
  annual medicare visit 1. Have you been to the ER, urgent care clinic since your last visit? Hospitalized since your last visit? No. 
 
2. Have you seen or consulted any other health care providers outside of the [address] [place] [address] since your last visit? No. 
 
3 most recent PHQ Screens 5/7/2019 Little interest or pleasure in doing things Nearly every day Feeling down, depressed, irritable, or hopeless Nearly every day Total Score PHQ 2 6 Fall Risk Assessment, last 12 mths 5/7/2019 Able to walk? Yes Fall in past 12 months? No  
Fall with injury? -  
Number of falls in past 12 months - Fall Risk Score -  
 
Visit Vitals /89 Pulse 76 Temp 97 °F (36.1 °C) (Oral) Resp 14 Ht 5' 2\" (1.575 m) Wt 128 lb (58.1 kg) SpO2 96% BMI 23.41 kg/m² Health Maintenance: · Patient decline PCV13  
· A1c result: 7.3

## 2019-05-07 NOTE — ACP (ADVANCE CARE PLANNING)
Advance Care Planning (ACP) Provider Note - Comprehensive     Date of ACP Conversation: 05/07/19  Persons included in Conversation:  patient and POA  Length of ACP Conversation in minutes:  <16 minutes (Non-Billable)    Authorized Decision Maker (if patient is incapable of making informed decisions): This person is:  Healthcare Agent/Medical Power of  under Advance Directive       General ACP for ALL Patients with Decision Making Capacity:   Exploration of values, goals, and preferences if recovery is not expected, even with continued medical treatment in the event of: Severe, permanent brain injury    Review of Existing Advance Directive:  Does this advance directive still reflect your preferences?   Yes (Provide new form/Refer for assistance in updating)    For Serious or Chronic Illness:  No known illness    Interventions Provided:  Reviewed existing Advance Directive

## 2019-05-07 NOTE — PROGRESS NOTES
This is the Subsequent Medicare Annual Wellness Exam, performed 12 months or more after the Initial AWV or the last Subsequent AWV I have reviewed the patient's medical history in detail and updated the computerized patient record. History Past Medical History:  
Diagnosis Date  Diverticulosis  DM type 2 (diabetes mellitus, type 2) ([address]  
 resolved  Hypercalcemia   
 nl PTH  Hyperlipidemia 2015  Hypertension 2010  Immunization refused   
 pt doesn't want any shots Past Surgical History:  
Procedure Laterality Date  HX CATARACT REMOVAL Bilateral 2000 Dr. David Yin  HX COLONOSCOPY  2014 [address] Patient states Total hysterectomy Current Outpatient Medications Medication Sig Dispense Refill  acarbose (PRECOSE) 100 mg tablet TAKE 1 TAB BY MOUTH THREE (3) TIMES DAILY (WITH MEALS). 270 Tab 1  
 donepezil (ARICEPT) 5 mg tablet TAKE 1 TABLET BY MOUTH EVERY DAY AT NIGHT 90 Tab 0  
 glucose blood VI test strips (TRUE METRIX GLUCOSE TEST STRIP) strip TEST BLOOD SUGAR ONE TIME DAILY FOR DM II E11.9 100 Strip 3  
 atorvastatin (LIPITOR) 20 mg tablet TAKE 1 TAB BY MOUTH DAILY. 90 Tab 1  
 amLODIPine-benazepril (LOTREL) 5-40 mg per capsule Take 1 Cap by mouth daily. 90 Cap 1  
 doxylamine succinate (UNISOM) 25 mg tablet Take 1 Tab by mouth nightly as needed for Sleep. 90 Tab 1  
 SITagliptin (JANUVIA) 100 mg tablet Take 1 Tab by mouth daily. 90 Tab 1  
 famotidine (PEPCID) 20 mg tablet Take 1 Tab by mouth daily. 90 Tab 1  MULTIVITAMINS W/C PO Take 1 Tab by mouth daily.  Blood-Glucose Meter (TRUE METRIX AIR GLUCOSE METER) misc Use as directed to check home blood sugar for DM II E11.9 1 Each 0  Blood Glucose Control, Low (TRUE METRIX LEVEL 1) soln Use as directed to check home blood sugar for DM II E11.9 1 Each 12  
  lancets (TRUEPLUS LANCETS) 33 gauge misc Use daily to check blood sugar for DM II E11.9 100 Lancet 3  
 alcohol swabs (BD SINGLE USE SWABS REGULAR) padm Use to clean finger to test blood sugar 1 time daily for DM II E11.9. 100 Pad 3  
 aspirin delayed-release 81 mg tablet Take 81 mg by mouth daily.  omega-3 fatty acids-vitamin e (FISH OIL) 1,000 mg cap Take 1 Cap by mouth daily. One capsule daily. 60 Cap 5 Allergies Allergen Reactions  Codeine Other (comments) Felt like a different person, per patient  Iodine Itching  Pcn [Penicillins] Itching  Sulfa (Sulfonamide Antibiotics) Unknown (comments)  Vicodin [Hydrocodone-Acetaminophen] Nausea Only Family History Problem Relation Age of Onset  Cancer Father  Breast Cancer Sister Social History Tobacco Use  Smoking status: Never Smoker  Smokeless tobacco: Never Used Substance Use Topics  Alcohol use: No  
  Alcohol/week: 0.0 oz Patient Active Problem List  
Diagnosis Code  Hypertension I10  
 Hypercalcemia E83.52  
 Hyperlipidemia E78.5  Goiter E04.9  Diverticulosis K57.90  
 Immunization refused Z28.21  
 Diabetes mellitus type 2, controlled ([facility] [address] E11.9  Thrombocytosis (HCC) D47.3  MGUS (monoclonal gammopathy of unknown significance) D47.2  Type 2 diabetes with nephropathy (HCC) E11.21 Depression Risk Factor Screening:  
 
3 most recent PHQ Screens 5/7/2019 Little interest or pleasure in doing things Nearly every day Feeling down, depressed, irritable, or hopeless Nearly every day Total Score PHQ 2 6 Alcohol Risk Factor Screening: You do not drink alcohol or very rarely. Functional Ability and Level of Safety:  
Hearing Loss Hearing is good. Whisper Test done with normal results. Activities of Daily Living The home contains: handrails Patient needs help with:  transportation, shopping, preparing meals, laundry, housework, managing medications, managing money and walking Fall Risk Fall Risk Assessment, last 12 mths 5/7/2019 Able to walk? Yes Fall in past 12 months? No  
Fall with injury? -  
Number of falls in past 12 months - Fall Risk Score -  
 
 
Abuse Screen Patient is not abused Diabetic foot exam:  
Left Foot: 
 Visual Exam: normal Bunion present. Pulse DP: 2+ (normal) Filament test: normal sensation Vibratory sensation: normal  
 Proprioception: normal 
   
Right Foot: 
 Visual Exam: normal  Bunion present. Pulse DP: 2+ (normal) Filament test: normal sensation Vibratory sensation: normal  
 Proprioception: normal 
 
Cognitive Screening Evaluation of Cognitive Function: 
Has your family/caregiver stated any concerns about your memory: yes Abnormal (Patient with known mild - moderate dementia) Patient Care Team  
Patient Care Team: 
Nan Calvert MD as PCP - [facility]) Estelle Garcia MD (Ophthalmology) Vonda Tidwell MD (Hematology and Oncology) Fermín Traylor RN as Ambulatory Care Navigator Hollie Egan MD as Physician (Neurology) Christin Morgan MD (Family Practice) Assessment/Plan Education and counseling provided: 
Are appropriate based on today's review and evaluation End-of-Life planning (with patient's consent) Diagnoses and all orders for this visit: 
 
1. Medicare annual wellness visit, subsequent 
-     FULL CODE 
 
2. Pneumococcal vaccination declined by patient 3. Situational depression 
-     traZODone (DESYREL) 50 mg tablet; Take 1 Tab by mouth nightly. 4. Insomnia, unspecified type 
-     traZODone (DESYREL) 50 mg tablet; Take 1 Tab by mouth nightly. 5. Type 2 diabetes mellitus without complication, without long-term current use of insulin ([address] -     AMB POC HEMOGLOBIN A1C 
-     SITagliptin (JANUVIA) 100 mg tablet; Take 1 Tab by mouth daily. -     MICROALBUMIN, UR, RAND W/ MICROALB/CREAT RATIO; Future 
-      DIABETES FOOT EXAM 
 
6. Gastroesophageal reflux disease, esophagitis presence not specified 
-     famotidine (PEPCID) 20 mg tablet; Take 1 Tab by mouth daily. 7. Essential hypertension 
-     amLODIPine-benazepril (LOTREL) 5-40 mg per capsule; Take 1 Cap by mouth daily. Health Maintenance Due Topic Date Due  
 HEMOGLOBIN A1C Q6M  04/22/2019  
 FOOT EXAM Q1  05/07/2019  MICROALBUMIN Q1  05/07/2019  MEDICARE YEARLY EXAM  05/08/2019

## 2019-05-07 NOTE — PATIENT INSTRUCTIONS
Medicare Wellness Visit, Female The best way to live healthy is to have a lifestyle where you eat a well-balanced diet, exercise regularly, limit alcohol use, and quit all forms of tobacco/nicotine, if applicable. Regular preventive services are another way to keep healthy. Preventive services (vaccines, screening tests, monitoring & exams) can help personalize your care plan, which helps you manage your own care. Screening tests can find health problems at the earliest stages, when they are easiest to treat. [facility] follows the current, evidence-based guidelines published by the [facility] ([facility]) when recommending preventive services for our patients. Because we follow these guidelines, sometimes recommendations change over time as research supports it. (For example, mammograms used to be recommended annually. Even though Medicare will still pay for an annual mammogram, the newer guidelines recommend a mammogram every two years for women of average risk.) Of course, you and your doctor may decide to screen more often for some diseases, based on your risk and your health status. Preventive services for you include: - Medicare offers their members a free annual wellness visit, which is time for you and your primary care provider to discuss and plan for your preventive service needs. Take advantage of this benefit every year! 
-All adults over the age of 72 should receive the recommended pneumonia vaccines. Current USPSTF guidelines recommend a series of two vaccines for the best pneumonia protection.  
-All adults should have a flu vaccine yearly and a tetanus vaccine every 10 years. All adults age 61 and older should receive a shingles vaccine once in their lifetime.   
-A bone mass density test is recommended when a woman turns 65 to screen for osteoporosis. This test is only recommended one time, as a screening. Some providers will use this same test as a disease monitoring tool if you already have osteoporosis. -All adults age 38-68 who are overweight should have a diabetes screening test once every three years.  
-Other screening tests and preventive services for persons with diabetes include: an eye exam to screen for diabetic retinopathy, a kidney function test, a foot exam, and stricter control over your cholesterol.  
-Cardiovascular screening for adults with routine risk involves an electrocardiogram (ECG) at intervals determined by your doctor.  
-Colorectal cancer screenings should be done for adults age 54-65 with no increased risk factors for colorectal cancer. There are a number of acceptable methods of screening for this type of cancer. Each test has its own benefits and drawbacks. Discuss with your doctor what is most appropriate for you during your annual wellness visit. The different tests include: colonoscopy (considered the best screening method), a fecal occult blood test, a fecal DNA test, and sigmoidoscopy. -Breast cancer screenings are recommended every other year for women of normal risk, age 54-69. 
-Cervical cancer screenings for women over age 72 are only recommended with certain risk factors.  
-All adults born between [facility] should be screened once for Hepatitis C. Here is a list of your current Health Maintenance items (your personalized list of preventive services) with a due date: 
Health Maintenance Due Topic Date Due  
 Hemoglobin A1C    04/22/2019 [address] [place] [address] Diabetic Foot Care  05/07/2019  Albumin Urine Test  05/07/2019 [address] [place] [address] Annual Well Visit  05/08/2019 Recovering From Depression: Care Instructions Your Care Instructions Taking good care of yourself is important as you recover from depression. In time, your symptoms will fade as your treatment takes hold. Do not give up. Instead, focus your energy on getting better. Your mood will improve. It just takes some time. Focus on things that can help you feel better, such as being with friends and family, eating well, and getting enough rest. But take things slowly. Do not do too much too soon. You will begin to feel better gradually. Follow-up care is a key part of your treatment and safety. Be sure to make and go to all appointments, and call your doctor if you are having problems. It's also a good idea to know your test results and keep a list of the medicines you take. How can you care for yourself at home? Be realistic · If you have a large task to do, break it up into smaller steps you can handle, and just do what you can. · You may want to put off important decisions until your depression has lifted. If you have plans that will have a major impact on your life, such as marriage, divorce, or a job change, try to wait a bit. Talk it over with friends and loved ones who can help you look at the overall picture first. 
· Reaching out to people for help is important. Do not isolate yourself. Let your family and friends help you. Find someone you can trust and confide in, and talk to that person. · Be patient, and be kind to yourself. Remember that depression is not your fault and is not something you can overcome with willpower alone. Treatment is necessary for depression, just like for any other illness. Feeling better takes time, and your mood will improve little by little. Stay active · Stay busy and get outside. Take a walk, or try some other light exercise. · Talk with your doctor about an exercise program. Exercise can help with mild depression. · Go to a movie or concert. Take part in a Adventist activity or other social gathering. Go to a ball game. · Ask a friend to have dinner with you. Take care of yourself · Eat a balanced diet with plenty of fresh fruits and vegetables, whole grains, and lean protein.  If you have lost your appetite, eat small snacks rather than large meals. · Avoid drinking alcohol or using illegal drugs. Do not take medicines that have not been prescribed for you. They may interfere with medicines you may be taking for depression, or they may make your depression worse. · Take your medicines exactly as they are prescribed. You may start to feel better within 1 to 3 weeks of taking antidepressant medicine. But it can take as many as 6 to 8 weeks to see more improvement. If you have questions or concerns about your medicines, or if you do not notice any improvement by 3 weeks, talk to your doctor. · If you have any side effects from your medicine, tell your doctor. Antidepressants can make you feel tired, dizzy, or nervous. Some people have dry mouth, constipation, headaches, sexual problems, or diarrhea. Many of these side effects are mild and will go away on their own after you have been taking the medicine for a few weeks. Some may last longer. Talk to your doctor if side effects are bothering you too much. You might be able to try a different medicine. · Get enough sleep. If you have problems sleeping: 
? Go to bed at the same time every night, and get up at the same time every morning. ? Keep your bedroom dark and quiet. ? Do not exercise after 5:00 p.m. ? Avoid drinks with caffeine after 5:00 p.m. · Avoid sleeping pills unless they are prescribed by the doctor treating your depression. Sleeping pills may make you groggy during the day, and they may interact with other medicine you are taking. · If you have any other illnesses, such as diabetes, heart disease, or high blood pressure, make sure to continue with your treatment. Tell your doctor about all of the medicines you take, including those with or without a prescription. · Keep the numbers for these national suicide hotlines: 0-431-479-TALK (5-901.426.3067) and 0-380-ZXLOADD (0-390.542.7581).  If you or someone you know talks about suicide or feeling hopeless, get help right away. When should you call for help? Call 911 anytime you think you may need emergency care. For example, call if: 
  · You feel like hurting yourself or someone else.  
  · Someone you know has depression and is about to attempt or is attempting suicide.  
SCHLAGLES your doctor now or seek immediate medical care if: 
  · You hear voices.  
  · Someone you know has depression and: 
? Starts to give away his or her possessions. ? Uses illegal drugs or drinks alcohol heavily. ? Talks or writes about death, including writing suicide notes or talking about guns, knives, or pills. ? Starts to spend a lot of time alone. ? Acts very aggressively or suddenly appears calm.  
 Watch closely for changes in your health, and be sure to contact your doctor if: 
  · You do not get better as expected. Where can you learn more? Go to http://cori-rojas.info/. Enter Q795 in the search box to learn more about \"Recovering From Depression: Care Instructions. \" Current as of: September 11, 2018 Content Version: 11.9 © 6235-7066 [employer]. Care instructions adapted under license by [employer] (which disclaims liability or warranty for this information). If you have questions about a medical condition or this instruction, always ask your healthcare professional. [address] any warranty or liability for your use of this information.

## 2019-05-08 DIAGNOSIS — E11.9 TYPE 2 DIABETES MELLITUS WITHOUT COMPLICATION, WITHOUT LONG-TERM CURRENT USE OF INSULIN (HCC): ICD-10-CM

## 2019-05-08 LAB
ALBUMIN/CREAT UR: 171.2 MG/G CREAT (ref 0–30)
CREAT UR-MCNC: 107.8 MG/DL
MICROALBUMIN UR-MCNC: 184.5 UG/ML

## 2019-05-08 RX ORDER — ISOPROPYL ALCOHOL 70 ML/100ML
SWAB TOPICAL
Qty: 100 PAD | Refills: 3 | Status: SHIPPED | OUTPATIENT
Start: 2019-05-08

## 2019-05-29 ENCOUNTER — HOME HEALTH ADMISSION (OUTPATIENT)
Dept: HOME HEALTH SERVICES | Facility: HOME HEALTH | Age: 84
End: 2019-05-29
Payer: MEDICARE

## 2019-05-29 ENCOUNTER — OFFICE VISIT (OUTPATIENT)
Dept: FAMILY MEDICINE CLINIC | Age: 84
End: 2019-05-29

## 2019-05-29 VITALS
HEART RATE: 83 BPM | OXYGEN SATURATION: 97 % | DIASTOLIC BLOOD PRESSURE: 83 MMHG | TEMPERATURE: 97.8 F | BODY MASS INDEX: 24.11 KG/M2 | WEIGHT: 131 LBS | RESPIRATION RATE: 14 BRPM | HEIGHT: 62 IN | SYSTOLIC BLOOD PRESSURE: 152 MMHG

## 2019-05-29 DIAGNOSIS — L89.322 PRESSURE INJURY OF LEFT BUTTOCK, STAGE 2 (HCC): Primary | ICD-10-CM

## 2019-05-29 NOTE — PROGRESS NOTES
Chief Complaint   Patient presents with    Decubitus Ulcer     possible bed sore on left buttock     1. Have you been to the ER, urgent care clinic since your last visit? Hospitalized since your last visit? No.    2. Have you seen or consulted any other health care providers outside of the [address] [place] [address] since your last visit? Include any pap smears or colon screening.  No.    Visit Vitals  /83   Pulse 83   Temp 97.8 °F (36.6 °C) (Oral)   Resp 14   Ht 5' 2\" (1.575 m)   Wt 131 lb (59.4 kg)   SpO2 97%   BMI 23.96 kg/m²

## 2019-05-29 NOTE — PROGRESS NOTES
[facility]    CC: Mayank    HPI:     - Timing/onset: Saturday or Sunday  - Duration: Constant  - Location: Left buttuck  - Quality: Red spot  - Context: Has been very sedentary. Sitting in recilner a day  - Progression/Course:  Worsening  - Associated Symptoms/signs: pus and itching  - Tried: Vaseline      ROS: Positive items marked in RED  CON: fever, chills  Cardiovascular: palpitations, CP  Resp: SOB, cough  GI: nausea, vomiting, diarrhea  : dysuria, hematuria    Past Medical History:   Diagnosis Date    Diverticulosis     DM type 2 (diabetes mellitus, type 2) ([facility] [address] 2012    Goiter 1994    resolved    Hypercalcemia     nl PTH    Hyperlipidemia 2015    Hypertension 2010    Immunization refused     pt doesn't want any shots       Past Surgical History:   Procedure Laterality Date    HX CATARACT REMOVAL Bilateral 2000    Dr. Gonzalo Phelps HX COLONOSCOPY  2014     [address]    Patient states Total hysterectomy       Family History   Problem Relation Age of Onset    Cancer Father     Breast Cancer Sister        Social History     Socioeconomic History    Marital status: UNKNOWN     Spouse name: Not on file    Number of children: Not on file    Years of education: Not on file    Highest education level: Not on file   Occupational History    Occupation: retired nurse   Tobacco Use    Smoking status: Never Smoker    Smokeless tobacco: Never Used   Substance and Sexual Activity    Alcohol use: No     Alcohol/week: 0.0 oz    Drug use: No    Sexual activity: Never     Partners: Male     Comment:        Allergies   Allergen Reactions    Codeine Other (comments)     Felt like a different person, per patient    Iodine Itching    Pcn [Penicillins] Itching    Sulfa (Sulfonamide Antibiotics) Unknown (comments)    Vicodin [Hydrocodone-Acetaminophen] Nausea Only         Current Outpatient Medications:     alcohol swabs (ALCOHOL PREP PADS) padm, USE TO CLEAN FINGER TO TEST BLOOD SUGAR 1 TIME DAILY FOR DM II E11.9., Disp: 100 Pad, Rfl: 3    famotidine (PEPCID) 20 mg tablet, Take 1 Tab by mouth daily. , Disp: 90 Tab, Rfl: 1    SITagliptin (JANUVIA) 100 mg tablet, Take 1 Tab by mouth daily. , Disp: 90 Tab, Rfl: 1    amLODIPine-benazepril (LOTREL) 5-40 mg per capsule, Take 1 Cap by mouth daily. , Disp: 90 Cap, Rfl: 1    traZODone (DESYREL) 50 mg tablet, Take 1 Tab by mouth nightly., Disp: 30 Tab, Rfl: 2    acarbose (PRECOSE) 100 mg tablet, TAKE 1 TAB BY MOUTH THREE (3) TIMES DAILY (WITH MEALS). , Disp: 270 Tab, Rfl: 1    donepezil (ARICEPT) 5 mg tablet, TAKE 1 TABLET BY MOUTH EVERY DAY AT NIGHT, Disp: 90 Tab, Rfl: 0    glucose blood VI test strips (TRUE METRIX GLUCOSE TEST STRIP) strip, TEST BLOOD SUGAR ONE TIME DAILY FOR DM II E11.9, Disp: 100 Strip, Rfl: 3    atorvastatin (LIPITOR) 20 mg tablet, TAKE 1 TAB BY MOUTH DAILY. , Disp: 90 Tab, Rfl: 1    doxylamine succinate (UNISOM) 25 mg tablet, Take 1 Tab by mouth nightly as needed for Sleep., Disp: 90 Tab, Rfl: 1    MULTIVITAMINS W/C PO, Take 1 Tab by mouth daily. , Disp: , Rfl:     Blood-Glucose Meter (TRUE METRIX AIR GLUCOSE METER) [facility], Use as directed to check home blood sugar for DM II E11.9, Disp: 1 Each, Rfl: 0    Blood Glucose Control, Low (TRUE METRIX LEVEL 1) soln, Use as directed to check home blood sugar for DM II E11.9, Disp: 1 Each, Rfl: 12    lancets (TRUEPLUS LANCETS) 33 gauge misc, Use daily to check blood sugar for DM II E11.9, Disp: 100 Lancet, Rfl: 3    aspirin delayed-release 81 mg tablet, Take 81 mg by mouth daily. , Disp: , Rfl:     omega-3 fatty acids-vitamin e (FISH OIL) 1,000 mg cap, Take 1 Cap by mouth daily. One capsule daily. , Disp: 60 Cap, Rfl: 5    Physical Exam:      /83   Pulse 83   Temp 97.8 °F (36.6 °C) (Oral)   Resp 14   Ht 5' 2\" (1.575 m)   Wt 131 lb (59.4 kg)   SpO2 97%   BMI 23.96 kg/m²     General:  WD, WN, NAD, conversant  Eyes: sclera clear bilaterally, no discharge noted, eyelids normal in appearance  HENT: NCAT  Lungs: CTAB, normal respiratory effort and rate  CV: RRR, no MRGs  ABD: soft, non-tender, non-distended, normal bowel sounds  Skin: left buttock with erythematous patch of skin that is non blanching,  Center of patch with yellow-grayish discharge with slight odor, area mildly tender to palpation  Psych: alert and oriented to person, place and situation, normal affect  Neuro: speech normal, moving all extremities, ambulating with rolling walker      Assessment/Plan     Pressure Ulcer of Left Buttock:  -Appears to be stage II  -Area cleaned and dressed  -Will refer for wound care  -Handout given on pressure ulcer care/prevention  -F/U in one month        Michael Cerna MD  5/29/2019, 12:43 PM

## 2019-05-29 NOTE — PATIENT INSTRUCTIONS
Pressure Injuries: Care Instructions  Your Care Instructions    A pressure injury on the skin is caused by constant pressure to that area. These injuries--also called decubitus ulcers or bedsores--may happen when you lie in bed or sit in a wheelchair for a long time. The constant pressure blocks the blood supply to the skin. This causes skin cells to die and creates a sore. Pressure injuries usually occur over bony areas, such as the hips, lower back, elbows, heels, and shoulders. They also can occur in places where the skin folds over on itself. You may have mild redness or open sores that are harder to heal.  Good care at home can help heal pressure injuries. You or your caregiver needs to check your skin every day for sores. You need good nutrition and plenty of fluids to keep your skin healthy and prevent new pressure injuries. Follow-up care is a key part of your treatment and safety. Be sure to make and go to all appointments, and call your doctor if you are having problems. It's also a good idea to know your test results and keep a list of the medicines you take. How can you care for yourself at home? · If your doctor prescribed a medicated ointment or cream, use it exactly as prescribed. Call your doctor if you think you are having a problem with your medicine. · Wash pressure injuries every day, or as often as your doctor recommends. Most tap water is safe, but follow the advice of your doctor or nurse. He or she may recommend that you use a saline solution. This is a salt and water solution that you can buy over the counter. · Put on bandages as your doctor or wound care specialist says. · Keep healthy tissue around the sore clean and dry. · Check your skin every day for sores (or have a caregiver do it). · If you know what is causing the pressure that caused the sore, find a way to remove that pressure.   To prevent pressure injuries  · Change your position or have your caregiver help you change your position often. You may need to do this every 2 hours if you are in bed or every 15 minutes if you are in a wheelchair. This lowers the chance of making sores worse and getting new sores. · Use special mattresses or other support. These may include low-pressure mattresses or cushions made of foam that can be filled with air, water, beads, or fiber. · Eat healthy foods with plenty of protein to help heal damaged skin and to help new skin grow. · Try to stay at a healthy weight. Being overweight can lead to more pressure on your skin. · Do not slide across sheets or slump in a chair or bed. · Do not smoke. Smoking dries the skin and reduces its blood supply. If you need help quitting, talk to your doctor about stop-smoking programs and medicines. These can increase your chances of quitting for good. When should you call for help? Call your doctor now or seek immediate medical care if:    · You have signs of infection, such as:  ? Increased pain, swelling, warmth, or redness. ? Red streaks leading from the sore. ? Pus draining from the sore. ? A fever.    Watch closely for changes in your health, and be sure to contact your doctor if:    · Your pressure injuries are not healing.     · You have new pressure injuries.     · You need help changing positions in bed or in a chair.     · Your caregiver needs help to move you. Where can you learn more? Go to http://cori-rojas.info/. Enter F114 in the search box to learn more about \"Pressure Injuries: Care Instructions. \"  Current as of: September 26, 2018  Content Version: 11.9  © 6937-9552 [employer]. Care instructions adapted under license by [employer] (which disclaims liability or warranty for this information).  If you have questions about a medical condition or this instruction, always ask your healthcare professional. [address] any warranty or liability for your use of this information. Learning About Preventing Pressure Injuries  What are pressure injuries? A pressure injury to the skin is caused by constant pressure over a period of time. The constant pressure blocks the blood supply to the skin. This causes skin cells to die and creates a sore. Pressure injuries are also called bedsores. Pressure injuries usually occur over bony areas, such as the hips, lower back, elbows, heels, and shoulders. Pressure injuries can also occur in places where the skin folds over on itself, or where medical equipment presses on the skin, such as when oxygen tubes press on the ears or cheeks. Pressure injuries can range from red areas on the surface of the skin to severe tissue damage that goes deep into muscle and bone. Severe sores are hard to treat and slow to heal. When pressure injuries do not heal properly, problems such as bone, blood, and skin infections can develop. What causes pressure injuries? Things that cause pressure injuries include:  · Pressure on the skin and tissues. For example, the sores may happen when a person lies in bed or sits in a wheelchair for a long time. This is the most common cause of pressure injuries. · Sliding down in a bed or chair, forcing the skin to fold over itself (shear force). · Being pulled across bed sheets or other surfaces (friction burns). As we get older, our skin gets more thin and dry and less elastic, so it is easier to damage. Poor nutrition and not getting enough fluids make these natural changes in the skin worse. Skin in this condition may easily develop a pressure injury. Skin can also be damaged by sweat, feces, or urine, making pressure injuries more likely and harder to heal.  How can you help prevent pressure injuries? If you are not able to do these things yourself, ask a family member or friend for help. Change position often  · In a bed, change position every 2 hours.   · In a wheelchair or other type of chair, shift your weight every 15 minutes, and give yourself a full relief of weight every hour. ? For a weight shift, lean forward and to the left and right. Push up out of the chair with your arms. If you have a chair that tilts, use the tilt control to help relieve pressure. ? For a full relief of weight, stand up or move to another chair or bed if you are able to. Personal care  · Check your skin every day, especially around bony areas. When a pressure injury is forming, skin temperature can be different than nearby skin. It might be warmer or cooler. The skin can feel either firmer or softer than the surrounding skin. · Keep your skin clean and free of sweat, wound drainage, urine, and feces. · Use skin lotions to keep your skin from drying out and cracking. Barrier lotions or creams have ingredients that can act as a shield to help protect the skin from moisture or irritation. · Try not to slide or slump across sheets in a chair or bed. And try not to sleep in a recliner chair. Lifestyle choices  · Eat healthy foods with plenty of protein to help heal damaged skin and to help new skin grow. · Get plenty of fluids. · Stay at a healthy weight. Being either overweight or underweight can make pressure injuries more likely. · If you smoke, stop. Smoking dries the skin and reduces its blood supply. If you need help quitting, talk to your doctor about stop-smoking programs and medicines. These can increase your chances of quitting for good. Ask about using cushions or pads  · Overlays are special pads you put on top of a mattress. They provide a softer surface that will fit your body's shape better than a regular mattress. · Cushions or devices can be used to reduce pressure on certain areas of the body. For example, you can use a \"medical heel pillow\" to help prevent pressure injuries on heels. You can also get cushions for seating surfaces, such as wheelchair seats. Talk with your doctor about cushions and pads. Some products, such as doughnut-type devices, may actually cause pressure injuries or make them worse. Where can you learn more? Go to http://cori-rojas.info/. Enter 910 2886 in the search box to learn more about \"Learning About Preventing Pressure Injuries. \"  Current as of: September 26, 2018  Content Version: 11.9  © 5447-6639 [employer]. Care instructions adapted under license by [employer] (which disclaims liability or warranty for this information). If you have questions about a medical condition or this instruction, always ask your healthcare professional. [place] [address] any warranty or liability for your use of this information.

## 2019-05-30 ENCOUNTER — HOME CARE VISIT (OUTPATIENT)
Dept: HOME HEALTH SERVICES | Facility: HOME HEALTH | Age: 84
End: 2019-05-30

## 2019-05-30 ENCOUNTER — HOME CARE VISIT (OUTPATIENT)
Dept: SCHEDULING | Facility: HOME HEALTH | Age: 84
End: 2019-05-30
Payer: MEDICARE

## 2019-05-30 PROCEDURE — 400013 HH SOC

## 2019-05-30 PROCEDURE — G0299 HHS/HOSPICE OF RN EA 15 MIN: HCPCS

## 2019-05-30 PROCEDURE — 3331090001 HH PPS REVENUE CREDIT

## 2019-05-30 PROCEDURE — 3331090002 HH PPS REVENUE DEBIT

## 2019-05-31 ENCOUNTER — HOME CARE VISIT (OUTPATIENT)
Dept: HOME HEALTH SERVICES | Facility: HOME HEALTH | Age: 84
End: 2019-05-31
Payer: MEDICARE

## 2019-05-31 VITALS
RESPIRATION RATE: 18 BRPM | TEMPERATURE: 98.7 F | OXYGEN SATURATION: 96 % | SYSTOLIC BLOOD PRESSURE: 130 MMHG | HEART RATE: 81 BPM | DIASTOLIC BLOOD PRESSURE: 70 MMHG

## 2019-05-31 PROCEDURE — 3331090002 HH PPS REVENUE DEBIT

## 2019-05-31 PROCEDURE — 3331090001 HH PPS REVENUE CREDIT

## 2019-06-01 ENCOUNTER — HOME CARE VISIT (OUTPATIENT)
Dept: SCHEDULING | Facility: HOME HEALTH | Age: 84
End: 2019-06-01
Payer: MEDICARE

## 2019-06-01 PROCEDURE — 3331090002 HH PPS REVENUE DEBIT

## 2019-06-01 PROCEDURE — 3331090001 HH PPS REVENUE CREDIT

## 2019-06-01 PROCEDURE — G0299 HHS/HOSPICE OF RN EA 15 MIN: HCPCS

## 2019-06-02 PROCEDURE — 3331090001 HH PPS REVENUE CREDIT

## 2019-06-02 PROCEDURE — 3331090002 HH PPS REVENUE DEBIT

## 2019-06-03 ENCOUNTER — HOME CARE VISIT (OUTPATIENT)
Dept: SCHEDULING | Facility: HOME HEALTH | Age: 84
End: 2019-06-03
Payer: MEDICARE

## 2019-06-03 VITALS
OXYGEN SATURATION: 96 % | TEMPERATURE: 97.9 F | DIASTOLIC BLOOD PRESSURE: 68 MMHG | SYSTOLIC BLOOD PRESSURE: 128 MMHG | HEART RATE: 76 BPM | RESPIRATION RATE: 18 BRPM

## 2019-06-03 VITALS
OXYGEN SATURATION: 99 % | HEART RATE: 74 BPM | DIASTOLIC BLOOD PRESSURE: 72 MMHG | TEMPERATURE: 98.4 F | RESPIRATION RATE: 14 BRPM | SYSTOLIC BLOOD PRESSURE: 140 MMHG

## 2019-06-03 PROCEDURE — 3331090002 HH PPS REVENUE DEBIT

## 2019-06-03 PROCEDURE — G0299 HHS/HOSPICE OF RN EA 15 MIN: HCPCS

## 2019-06-03 PROCEDURE — 3331090001 HH PPS REVENUE CREDIT

## 2019-06-04 PROCEDURE — 3331090002 HH PPS REVENUE DEBIT

## 2019-06-04 PROCEDURE — 3331090001 HH PPS REVENUE CREDIT

## 2019-06-05 ENCOUNTER — HOME CARE VISIT (OUTPATIENT)
Dept: SCHEDULING | Facility: HOME HEALTH | Age: 84
End: 2019-06-05
Payer: MEDICARE

## 2019-06-05 VITALS
SYSTOLIC BLOOD PRESSURE: 138 MMHG | TEMPERATURE: 97.8 F | RESPIRATION RATE: 18 BRPM | HEART RATE: 72 BPM | DIASTOLIC BLOOD PRESSURE: 80 MMHG | OXYGEN SATURATION: 98 %

## 2019-06-05 PROCEDURE — 3331090001 HH PPS REVENUE CREDIT

## 2019-06-05 PROCEDURE — 3331090002 HH PPS REVENUE DEBIT

## 2019-06-05 PROCEDURE — G0300 HHS/HOSPICE OF LPN EA 15 MIN: HCPCS

## 2019-06-06 PROCEDURE — 3331090002 HH PPS REVENUE DEBIT

## 2019-06-06 PROCEDURE — 3331090001 HH PPS REVENUE CREDIT

## 2019-06-07 ENCOUNTER — HOME CARE VISIT (OUTPATIENT)
Dept: SCHEDULING | Facility: HOME HEALTH | Age: 84
End: 2019-06-07
Payer: MEDICARE

## 2019-06-07 PROCEDURE — 3331090001 HH PPS REVENUE CREDIT

## 2019-06-07 PROCEDURE — 3331090002 HH PPS REVENUE DEBIT

## 2019-06-08 PROCEDURE — 3331090001 HH PPS REVENUE CREDIT

## 2019-06-08 PROCEDURE — 3331090002 HH PPS REVENUE DEBIT

## 2019-06-09 ENCOUNTER — HOME CARE VISIT (OUTPATIENT)
Dept: SCHEDULING | Facility: HOME HEALTH | Age: 84
End: 2019-06-09
Payer: MEDICARE

## 2019-06-09 PROCEDURE — G0299 HHS/HOSPICE OF RN EA 15 MIN: HCPCS

## 2019-06-09 PROCEDURE — 3331090001 HH PPS REVENUE CREDIT

## 2019-06-09 PROCEDURE — 3331090002 HH PPS REVENUE DEBIT

## 2019-06-10 VITALS
SYSTOLIC BLOOD PRESSURE: 140 MMHG | HEART RATE: 74 BPM | RESPIRATION RATE: 14 BRPM | OXYGEN SATURATION: 95 % | TEMPERATURE: 98.4 F | DIASTOLIC BLOOD PRESSURE: 80 MMHG

## 2019-06-10 PROCEDURE — 3331090002 HH PPS REVENUE DEBIT

## 2019-06-10 PROCEDURE — 3331090001 HH PPS REVENUE CREDIT

## 2019-06-11 ENCOUNTER — HOME CARE VISIT (OUTPATIENT)
Dept: SCHEDULING | Facility: HOME HEALTH | Age: 84
End: 2019-06-11
Payer: MEDICARE

## 2019-06-11 VITALS
DIASTOLIC BLOOD PRESSURE: 70 MMHG | HEART RATE: 68 BPM | SYSTOLIC BLOOD PRESSURE: 142 MMHG | TEMPERATURE: 97.9 F | OXYGEN SATURATION: 98 % | RESPIRATION RATE: 16 BRPM

## 2019-06-11 PROCEDURE — 3331090001 HH PPS REVENUE CREDIT

## 2019-06-11 PROCEDURE — G0299 HHS/HOSPICE OF RN EA 15 MIN: HCPCS

## 2019-06-11 PROCEDURE — 3331090002 HH PPS REVENUE DEBIT

## 2019-06-12 PROCEDURE — 3331090002 HH PPS REVENUE DEBIT

## 2019-06-12 PROCEDURE — 3331090001 HH PPS REVENUE CREDIT

## 2019-06-13 PROCEDURE — 3331090001 HH PPS REVENUE CREDIT

## 2019-06-13 PROCEDURE — 3331090002 HH PPS REVENUE DEBIT

## 2019-06-14 ENCOUNTER — HOME CARE VISIT (OUTPATIENT)
Dept: SCHEDULING | Facility: HOME HEALTH | Age: 84
End: 2019-06-14
Payer: MEDICARE

## 2019-06-14 PROCEDURE — 3331090002 HH PPS REVENUE DEBIT

## 2019-06-14 PROCEDURE — 3331090001 HH PPS REVENUE CREDIT

## 2019-06-14 PROCEDURE — G0299 HHS/HOSPICE OF RN EA 15 MIN: HCPCS

## 2019-06-15 VITALS
SYSTOLIC BLOOD PRESSURE: 140 MMHG | DIASTOLIC BLOOD PRESSURE: 80 MMHG | HEART RATE: 71 BPM | TEMPERATURE: 96.9 F | RESPIRATION RATE: 20 BRPM | OXYGEN SATURATION: 97 %

## 2019-06-15 PROCEDURE — 3331090002 HH PPS REVENUE DEBIT

## 2019-06-15 PROCEDURE — 3331090001 HH PPS REVENUE CREDIT

## 2019-06-16 PROCEDURE — 3331090002 HH PPS REVENUE DEBIT

## 2019-06-16 PROCEDURE — 3331090001 HH PPS REVENUE CREDIT

## 2019-06-17 ENCOUNTER — HOSPITAL ENCOUNTER (OUTPATIENT)
Dept: LAB | Age: 84
Discharge: HOME | End: 2019-06-17
Payer: MEDICARE

## 2019-06-17 ENCOUNTER — HOME CARE VISIT (OUTPATIENT)
Dept: SCHEDULING | Facility: HOME HEALTH | Age: 84
End: 2019-06-17
Payer: MEDICARE

## 2019-06-17 VITALS
SYSTOLIC BLOOD PRESSURE: 120 MMHG | RESPIRATION RATE: 12 BRPM | HEART RATE: 78 BPM | TEMPERATURE: 97.5 F | OXYGEN SATURATION: 97 % | DIASTOLIC BLOOD PRESSURE: 70 MMHG

## 2019-06-17 PROCEDURE — 3331090002 HH PPS REVENUE DEBIT

## 2019-06-17 PROCEDURE — 3331090001 HH PPS REVENUE CREDIT

## 2019-06-17 PROCEDURE — G0299 HHS/HOSPICE OF RN EA 15 MIN: HCPCS

## 2019-06-17 PROCEDURE — 87086 URINE CULTURE/COLONY COUNT: CPT

## 2019-06-17 PROCEDURE — 81003 URINALYSIS AUTO W/O SCOPE: CPT

## 2019-06-18 ENCOUNTER — HOME CARE VISIT (OUTPATIENT)
Dept: SCHEDULING | Facility: HOME HEALTH | Age: 84
End: 2019-06-18
Payer: MEDICARE

## 2019-06-18 PROCEDURE — G0152 HHCP-SERV OF OT,EA 15 MIN: HCPCS

## 2019-06-18 PROCEDURE — G0151 HHCP-SERV OF PT,EA 15 MIN: HCPCS

## 2019-06-18 PROCEDURE — 3331090001 HH PPS REVENUE CREDIT

## 2019-06-18 PROCEDURE — 3331090002 HH PPS REVENUE DEBIT

## 2019-06-19 ENCOUNTER — OFFICE VISIT (OUTPATIENT)
Dept: FAMILY MEDICINE CLINIC | Age: 84
End: 2019-06-19

## 2019-06-19 VITALS
OXYGEN SATURATION: 96 % | SYSTOLIC BLOOD PRESSURE: 151 MMHG | HEART RATE: 76 BPM | TEMPERATURE: 98 F | DIASTOLIC BLOOD PRESSURE: 80 MMHG

## 2019-06-19 VITALS
OXYGEN SATURATION: 93 % | SYSTOLIC BLOOD PRESSURE: 122 MMHG | DIASTOLIC BLOOD PRESSURE: 84 MMHG | HEART RATE: 62 BPM | TEMPERATURE: 97.8 F

## 2019-06-19 VITALS
OXYGEN SATURATION: 97 % | RESPIRATION RATE: 17 BRPM | SYSTOLIC BLOOD PRESSURE: 159 MMHG | BODY MASS INDEX: 24.4 KG/M2 | HEART RATE: 72 BPM | TEMPERATURE: 98 F | WEIGHT: 132.6 LBS | HEIGHT: 62 IN | DIASTOLIC BLOOD PRESSURE: 83 MMHG

## 2019-06-19 DIAGNOSIS — L89.302 PRESSURE INJURY OF BUTTOCK, STAGE 2, UNSPECIFIED LATERALITY (HCC): ICD-10-CM

## 2019-06-19 DIAGNOSIS — R04.0 EPISTAXIS: ICD-10-CM

## 2019-06-19 DIAGNOSIS — J31.0 RHINITIS, UNSPECIFIED TYPE: Primary | ICD-10-CM

## 2019-06-19 LAB
BACTERIA SPEC CULT: ABNORMAL
BACTERIA SPEC CULT: ABNORMAL
SERVICE CMNT-IMP: ABNORMAL

## 2019-06-19 PROCEDURE — 3331090002 HH PPS REVENUE DEBIT

## 2019-06-19 PROCEDURE — 3331090001 HH PPS REVENUE CREDIT

## 2019-06-19 RX ORDER — FLUTICASONE PROPIONATE 50 MCG
2 SPRAY, SUSPENSION (ML) NASAL DAILY
Qty: 1 BOTTLE | Refills: 6 | Status: SHIPPED | OUTPATIENT
Start: 2019-06-19 | End: 2019-08-29 | Stop reason: SDUPTHER

## 2019-06-19 NOTE — PROGRESS NOTES
[facility]    CC: Follow-up for pressure ulcer    HPI:     Pressure ulcer:  -Has improved:  -Getting wound care as a week  -Applying Desitin BID  -Making sure to get up and walk every hour  -PT went to her home yesterday and are having her do exercises  -Of note, patient concerned about a nosebleed episode that occurred out any picking/provocation      ROS: Positive items marked in RED  CON: fever, chills  Cardiovascular: palpitations, CP  Resp: SOB, cough  GI: nausea, vomiting, diarrhea  : dysuria, hematuria        Past Medical History:   Diagnosis Date    Diverticulosis     DM type 2 (diabetes mellitus, type 2) ([facility] [address] 2012    Goiter 1994    resolved    Hypercalcemia     nl PTH    Hyperlipidemia 2015    Hypertension 2010    Immunization refused     pt doesn't want any shots       Past Surgical History:   Procedure Laterality Date    HX CATARACT REMOVAL Bilateral 2000    Dr. Bruno Nixon HX COLONOSCOPY  2014     [facility] 2001 [facility]    Patient states Total hysterectomy       Family History   Problem Relation Age of Onset    Cancer Father     Breast Cancer Sister        Social History     Socioeconomic History    Marital status: UNKNOWN     Spouse name: Not on file    Number of children: Not on file    Years of education: Not on file    Highest education level: Not on file   Occupational History    Occupation: retired nurse   Tobacco Use    Smoking status: Never Smoker    Smokeless tobacco: Never Used   Substance and Sexual Activity    Alcohol use: No     Alcohol/week: 0.0 oz    Drug use: No    Sexual activity: Never     Partners: Male     Comment:        Allergies   Allergen Reactions    Codeine Other (comments)     Felt like a different person, per patient    Iodine Itching    Pcn [Penicillins] Itching    Sulfa (Sulfonamide Antibiotics) Unknown (comments)    Vicodin [Hydrocodone-Acetaminophen] Nausea Only Current Outpatient Medications:     docosahexanoic acid/epa (FISH OIL PO), Take 1,000 mg by mouth daily. , Disp: , Rfl:     multivit-min/folic [id number] (ALIVE WOMEN'S GUMMY VITAMINS PO), Take 1 Tab by mouth daily. , Disp: , Rfl:     alcohol swabs (ALCOHOL PREP PADS) padm, USE TO CLEAN FINGER TO TEST BLOOD SUGAR 1 TIME DAILY FOR DM II E11.9., Disp: 100 Pad, Rfl: 3    famotidine (PEPCID) 20 mg tablet, Take 1 Tab by mouth daily. , Disp: 90 Tab, Rfl: 1    SITagliptin (JANUVIA) 100 mg tablet, Take 1 Tab by mouth daily. , Disp: 90 Tab, Rfl: 1    amLODIPine-benazepril (LOTREL) 5-40 mg per capsule, Take 1 Cap by mouth daily. , Disp: 90 Cap, Rfl: 1    traZODone (DESYREL) 50 mg tablet, Take 1 Tab by mouth nightly., Disp: 30 Tab, Rfl: 2    acarbose (PRECOSE) 100 mg tablet, TAKE 1 TAB BY MOUTH THREE (3) TIMES DAILY (WITH MEALS). , Disp: 270 Tab, Rfl: 1    donepezil (ARICEPT) 5 mg tablet, TAKE 1 TABLET BY MOUTH EVERY DAY AT NIGHT, Disp: 90 Tab, Rfl: 0    glucose blood VI test strips (TRUE METRIX GLUCOSE TEST STRIP) strip, TEST BLOOD SUGAR ONE TIME DAILY FOR DM II E11.9, Disp: 100 Strip, Rfl: 3    atorvastatin (LIPITOR) 20 mg tablet, TAKE 1 TAB BY MOUTH DAILY. , Disp: 90 Tab, Rfl: 1    doxylamine succinate (UNISOM) 25 mg tablet, Take 1 Tab by mouth nightly as needed for Sleep., Disp: 90 Tab, Rfl: 1    MULTIVITAMINS W/C PO, Take 1 Tab by mouth daily. , Disp: , Rfl:     Blood-Glucose Meter (TRUE METRIX AIR GLUCOSE METER) [facility], Use as directed to check home blood sugar for DM II E11.9, Disp: 1 Each, Rfl: 0    Blood Glucose Control, Low (TRUE METRIX LEVEL 1) soln, Use as directed to check home blood sugar for DM II E11.9, Disp: 1 Each, Rfl: 12    lancets (TRUEPLUS LANCETS) 33 gauge misc, Use daily to check blood sugar for DM II E11.9, Disp: 100 Lancet, Rfl: 3    aspirin delayed-release 81 mg tablet, Take 81 mg by mouth daily. , Disp: , Rfl:     omega-3 fatty acids-vitamin e (FISH OIL) 1,000 mg cap, Take 1 Cap by mouth daily. One capsule daily. , Disp: 60 Cap, Rfl: 5    Physical Exam:      /83   Pulse 72   Temp 98 °F (36.7 °C) (Oral)   Resp 17   Ht 5' 2\" (1.575 m)   Wt 132 lb 9.6 oz (60.1 kg)   SpO2 97%   BMI 24.25 kg/m²     General:  WD, WN, NAD, conversant  Eyes: sclera clear bilaterally, no discharge noted, eyelids normal in appearance  HENT: NCAT, nasal turbinates enlarged bilaterally  Lungs: CTAB, normal respiratory effort and rate  CV: RRR, no MRGs  ABD: soft, non-tender, non-distended, normal bowel sounds  Skin: normal temperature, turgor, color, and texture  Psych: alert and oriented to person, place and situation, normal affect  Neuro: speech normal, moving all extremities      Assessment/Plan     Pressure injury of buttock stage II, improving  -Patient encouraged to continue following lifestyle changes for prevention of pressure ulcers  -Follow-up as needed      Rhinitis:  -Patient was educated about epistaxis and a common occurrence with rhinitis  -Will start on Flonase  -Handout given on nosebleeds  -Follow-up as needed        Galo Gregg MD  6/19/2019, 11:29 AM

## 2019-06-19 NOTE — PATIENT INSTRUCTIONS
Nosebleeds: Care Instructions Your Care Instructions Nosebleeds are common, especially if you have colds or allergies. Many things can cause a nosebleed. Some nosebleeds stop on their own with pressure. Others need packing. Some get cauterized (sealed). If you have gauze or other packing materials in your nose, you will need to follow up with your doctor to have the packing removed. You may need more treatment if you get nosebleeds a lot. The doctor has checked you carefully, but problems can develop later. If you notice any problems or new symptoms, get medical treatment right away. Follow-up care is a key part of your treatment and safety. Be sure to make and go to all appointments, and call your doctor if you are having problems. It's also a good idea to know your test results and keep a list of the medicines you take. How can you care for yourself at home? · If you get another nosebleed: ? Sit up and tilt your head slightly forward. This keeps blood from going down your throat. ? Use your thumb and index finger to pinch your nose shut for 10 minutes. Use a clock. Do not check to see if the bleeding has stopped before the 10 minutes are up. If the bleeding has not stopped, pinch your nose shut for another 10 minutes. ? When the bleeding has stopped, try not to pick, rub, or blow your nose for 12 hours. Avoiding these things helps keep your nose from bleeding again. · If your doctor prescribed antibiotics, take them as directed. Do not stop taking them just because you feel better. You need to take the full course of antibiotics. To prevent nosebleeds · Do not blow your nose too hard. · Try not to lift or strain after a nosebleed. · Raise your head on a pillow while you sleep. · Put a thin layer of a saline- or water-based nasal gel, such as NasoGel, inside your nose. Put it on the septum, which divides your nostrils. This will prevent dryness that can cause nosebleeds. · Use a vaporizer or humidifier to add moisture to your bedroom. Follow the directions for cleaning the machine. · Do not use aspirin, ibuprofen (Advil, Motrin), or naproxen (Aleve) for 36 to 48 hours after a nosebleed unless your doctor tells you to. You can use acetaminophen (Tylenol) for pain relief. · Talk to your doctor about stopping any other medicines you are taking. Some medicines may make you more likely to get a nosebleed. · Do not use cold medicines or nasal sprays without first talking to your doctor. They can make your nose dry. When should you call for help? Call 911 anytime you think you may need emergency care. For example, call if: 
  · You passed out (lost consciousness).  
 Call your doctor now or seek immediate medical care if: 
  · You get another nosebleed and your nose is still bleeding after you have applied pressure 3 times for 10 minutes each time (30 minutes total).  
  · There is a lot of blood running down the back of your throat even after you pinch your nose and tilt your head forward.  
  · You have a fever.  
  · You have sinus pain.  
 Watch closely for changes in your health, and be sure to contact your doctor if: 
  · You get nosebleeds often, even if they stop.  
  · You do not get better as expected. Where can you learn more? Go to http://cori-rojas.info/. Enter S156 in the search box to learn more about \"Nosebleeds: Care Instructions. \" Current as of: September 23, 2018 Content Version: 11.9 © 1107-5077 [employer]. Care instructions adapted under license by [employer] (which disclaims liability or warranty for this information). If you have questions about a medical condition or this instruction, always ask your healthcare professional. [address] any warranty or liability for your use of this information.

## 2019-06-19 NOTE — PROGRESS NOTES
1. Have you been to the ER, urgent care clinic since your last visit? Hospitalized since your last visit? No.     2. Have you seen or consulted any other health care providers outside of the [address] [place] [address] since your last visit? Include any pap smears or colon screening.  No.     Chief Complaint   Patient presents with    Follow Up Chronic Condition

## 2019-06-20 ENCOUNTER — HOME CARE VISIT (OUTPATIENT)
Dept: SCHEDULING | Facility: HOME HEALTH | Age: 84
End: 2019-06-20
Payer: MEDICARE

## 2019-06-20 VITALS
OXYGEN SATURATION: 98 % | HEART RATE: 77 BPM | DIASTOLIC BLOOD PRESSURE: 86 MMHG | SYSTOLIC BLOOD PRESSURE: 156 MMHG | TEMPERATURE: 97.8 F

## 2019-06-20 PROCEDURE — 3331090002 HH PPS REVENUE DEBIT

## 2019-06-20 PROCEDURE — G0157 HHC PT ASSISTANT EA 15: HCPCS

## 2019-06-20 PROCEDURE — 3331090001 HH PPS REVENUE CREDIT

## 2019-06-21 ENCOUNTER — HOME CARE VISIT (OUTPATIENT)
Dept: SCHEDULING | Facility: HOME HEALTH | Age: 84
End: 2019-06-21
Payer: MEDICARE

## 2019-06-21 VITALS
SYSTOLIC BLOOD PRESSURE: 140 MMHG | TEMPERATURE: 97.9 F | RESPIRATION RATE: 12 BRPM | HEART RATE: 78 BPM | OXYGEN SATURATION: 95 % | DIASTOLIC BLOOD PRESSURE: 80 MMHG

## 2019-06-21 PROCEDURE — 3331090002 HH PPS REVENUE DEBIT

## 2019-06-21 PROCEDURE — G0299 HHS/HOSPICE OF RN EA 15 MIN: HCPCS

## 2019-06-21 PROCEDURE — G0157 HHC PT ASSISTANT EA 15: HCPCS

## 2019-06-21 PROCEDURE — 3331090001 HH PPS REVENUE CREDIT

## 2019-06-22 VITALS
HEART RATE: 78 BPM | OXYGEN SATURATION: 95 % | SYSTOLIC BLOOD PRESSURE: 140 MMHG | TEMPERATURE: 97.9 F | DIASTOLIC BLOOD PRESSURE: 80 MMHG

## 2019-06-22 PROCEDURE — 3331090002 HH PPS REVENUE DEBIT

## 2019-06-22 PROCEDURE — 3331090001 HH PPS REVENUE CREDIT

## 2019-06-23 PROCEDURE — 3331090002 HH PPS REVENUE DEBIT

## 2019-06-23 PROCEDURE — 3331090001 HH PPS REVENUE CREDIT

## 2019-06-24 PROCEDURE — 3331090001 HH PPS REVENUE CREDIT

## 2019-06-24 PROCEDURE — 3331090002 HH PPS REVENUE DEBIT

## 2019-06-25 ENCOUNTER — HOME CARE VISIT (OUTPATIENT)
Dept: SCHEDULING | Facility: HOME HEALTH | Age: 84
End: 2019-06-25
Payer: MEDICARE

## 2019-06-25 VITALS
SYSTOLIC BLOOD PRESSURE: 147 MMHG | DIASTOLIC BLOOD PRESSURE: 81 MMHG | TEMPERATURE: 97.5 F | HEART RATE: 77 BPM | OXYGEN SATURATION: 97 %

## 2019-06-25 PROCEDURE — 3331090002 HH PPS REVENUE DEBIT

## 2019-06-25 PROCEDURE — 3331090001 HH PPS REVENUE CREDIT

## 2019-06-25 PROCEDURE — G0157 HHC PT ASSISTANT EA 15: HCPCS

## 2019-06-26 ENCOUNTER — HOME CARE VISIT (OUTPATIENT)
Dept: SCHEDULING | Facility: HOME HEALTH | Age: 84
End: 2019-06-26
Payer: MEDICARE

## 2019-06-26 PROCEDURE — 3331090001 HH PPS REVENUE CREDIT

## 2019-06-26 PROCEDURE — G0157 HHC PT ASSISTANT EA 15: HCPCS

## 2019-06-26 PROCEDURE — 3331090002 HH PPS REVENUE DEBIT

## 2019-06-27 VITALS
SYSTOLIC BLOOD PRESSURE: 140 MMHG | OXYGEN SATURATION: 95 % | DIASTOLIC BLOOD PRESSURE: 80 MMHG | TEMPERATURE: 98 F | HEART RATE: 73 BPM

## 2019-06-27 PROCEDURE — 3331090002 HH PPS REVENUE DEBIT

## 2019-06-27 PROCEDURE — 3331090001 HH PPS REVENUE CREDIT

## 2019-06-28 ENCOUNTER — TELEPHONE (OUTPATIENT)
Dept: FAMILY MEDICINE CLINIC | Age: 84
End: 2019-06-28

## 2019-06-28 PROCEDURE — 3331090001 HH PPS REVENUE CREDIT

## 2019-06-28 PROCEDURE — 3331090002 HH PPS REVENUE DEBIT

## 2019-06-28 NOTE — TELEPHONE ENCOUNTER
The patients daughter is requesting an order for her mothers Finger nails to be clipped. She states that [facility] said they cannot cut the patients finger nails without an \"order\". The patients toe nails are taken care of by  Dr. William Wharton (mobile podiatry). She has been trying to reach him to come back out. (He comes every 3 months per daughter.)

## 2019-06-28 NOTE — TELEPHONE ENCOUNTER
Note    Copy and pasted from my note this AM that I did not route appropriately:    The patients daughter is requesting an order for her mothers Finger nails to be clipped. She states that [address] [facility] said they cannot cut the patients finger nails without an \"order\". The patients toe nails are taken care of by  Dr. Ezio Moss (mobile podiatry). She has been trying to reach him to come back out. (He comes every 3 months per daughter.)

## 2019-06-29 PROCEDURE — 3331090002 HH PPS REVENUE DEBIT

## 2019-06-29 PROCEDURE — 3331090001 HH PPS REVENUE CREDIT

## 2019-06-30 PROCEDURE — 3331090001 HH PPS REVENUE CREDIT

## 2019-06-30 PROCEDURE — 3331090002 HH PPS REVENUE DEBIT

## 2019-07-01 ENCOUNTER — HOME CARE VISIT (OUTPATIENT)
Dept: SCHEDULING | Facility: HOME HEALTH | Age: 84
End: 2019-07-01
Payer: MEDICARE

## 2019-07-01 VITALS
DIASTOLIC BLOOD PRESSURE: 88 MMHG | SYSTOLIC BLOOD PRESSURE: 138 MMHG | HEART RATE: 74 BPM | TEMPERATURE: 97.8 F | OXYGEN SATURATION: 97 %

## 2019-07-01 PROCEDURE — 3331090003 HH PPS REVENUE ADJ

## 2019-07-01 PROCEDURE — 3331090001 HH PPS REVENUE CREDIT

## 2019-07-01 PROCEDURE — G0151 HHCP-SERV OF PT,EA 15 MIN: HCPCS

## 2019-07-01 PROCEDURE — 3331090002 HH PPS REVENUE DEBIT

## 2019-07-02 PROCEDURE — 3331090002 HH PPS REVENUE DEBIT

## 2019-07-02 PROCEDURE — 3331090001 HH PPS REVENUE CREDIT

## 2019-07-09 NOTE — TELEPHONE ENCOUNTER
Spoke with Desiree @ [facility]. She stated they do not cut finger nails.

## 2019-07-29 DIAGNOSIS — G47.00 INSOMNIA, UNSPECIFIED TYPE: ICD-10-CM

## 2019-07-29 DIAGNOSIS — F43.21 SITUATIONAL DEPRESSION: ICD-10-CM

## 2019-07-29 DIAGNOSIS — E11.9 TYPE 2 DIABETES MELLITUS WITHOUT COMPLICATION, WITHOUT LONG-TERM CURRENT USE OF INSULIN (HCC): ICD-10-CM

## 2019-07-30 NOTE — TELEPHONE ENCOUNTER
Last appt was 6-19-19  Next appt is 9-19-19    Last refill of this medication was     5-7-19 #30 with 2 rf

## 2019-08-01 RX ORDER — ATORVASTATIN CALCIUM 20 MG/1
TABLET, FILM COATED ORAL
Qty: 90 TAB | Refills: 1 | Status: SHIPPED | OUTPATIENT
Start: 2019-08-01 | End: 2020-01-20

## 2019-08-01 RX ORDER — TRAZODONE HYDROCHLORIDE 50 MG/1
TABLET ORAL
Qty: 30 TAB | Refills: 2 | Status: SHIPPED | OUTPATIENT
Start: 2019-08-01 | End: 2019-10-21 | Stop reason: SDUPTHER

## 2019-08-29 ENCOUNTER — OFFICE VISIT (OUTPATIENT)
Dept: FAMILY MEDICINE CLINIC | Age: 84
End: 2019-08-29

## 2019-08-29 VITALS
HEIGHT: 62 IN | WEIGHT: 130 LBS | RESPIRATION RATE: 12 BRPM | HEART RATE: 84 BPM | SYSTOLIC BLOOD PRESSURE: 145 MMHG | BODY MASS INDEX: 23.92 KG/M2 | DIASTOLIC BLOOD PRESSURE: 84 MMHG | TEMPERATURE: 97.3 F

## 2019-08-29 NOTE — PROGRESS NOTES
1. Have you been to the ER, urgent care clinic since your last visit? Hospitalized since your last visit? No    2. Have you seen or consulted any other health care providers outside of the [address] [place] [address] since your last visit? Include any pap smears or colon screening.  No

## 2019-09-05 DIAGNOSIS — F03.90 DEMENTIA WITHOUT BEHAVIORAL DISTURBANCE, UNSPECIFIED DEMENTIA TYPE: ICD-10-CM

## 2019-09-18 RX ORDER — DONEPEZIL HYDROCHLORIDE 5 MG/1
TABLET, FILM COATED ORAL
Qty: 90 TAB | Refills: 0 | Status: SHIPPED | OUTPATIENT
Start: 2019-09-18 | End: 2019-12-08 | Stop reason: SDUPTHER

## 2019-10-10 DIAGNOSIS — E11.9 TYPE 2 DIABETES MELLITUS WITHOUT COMPLICATION, WITHOUT LONG-TERM CURRENT USE OF INSULIN (HCC): ICD-10-CM

## 2019-10-15 RX ORDER — ACARBOSE 100 MG/1
100 TABLET ORAL
Qty: 270 TAB | Refills: 1 | Status: SHIPPED | OUTPATIENT
Start: 2019-10-15

## 2019-10-17 ENCOUNTER — OFFICE VISIT (OUTPATIENT)
Dept: FAMILY MEDICINE CLINIC | Age: 84
End: 2019-10-17

## 2019-10-17 VITALS
HEIGHT: 62 IN | TEMPERATURE: 97.2 F | OXYGEN SATURATION: 98 % | BODY MASS INDEX: 24.29 KG/M2 | WEIGHT: 132 LBS | HEART RATE: 84 BPM | DIASTOLIC BLOOD PRESSURE: 96 MMHG | SYSTOLIC BLOOD PRESSURE: 162 MMHG | RESPIRATION RATE: 12 BRPM

## 2019-10-17 DIAGNOSIS — E11.9 TYPE 2 DIABETES MELLITUS WITHOUT COMPLICATION, WITHOUT LONG-TERM CURRENT USE OF INSULIN (HCC): Primary | ICD-10-CM

## 2019-10-17 DIAGNOSIS — R80.9 MICROALBUMINURIA: ICD-10-CM

## 2019-10-17 DIAGNOSIS — I10 ESSENTIAL HYPERTENSION WITH GOAL BLOOD PRESSURE LESS THAN 130/80: ICD-10-CM

## 2019-10-17 LAB — HBA1C MFR BLD HPLC: 8.7 %

## 2019-10-17 RX ORDER — AMLODIPINE BESYLATE 10 MG/1
10 TABLET ORAL DAILY
Qty: 90 TAB | Refills: 2 | Status: SHIPPED | OUTPATIENT
Start: 2019-10-17

## 2019-10-17 RX ORDER — METFORMIN HYDROCHLORIDE 500 MG/1
500 TABLET, EXTENDED RELEASE ORAL
Qty: 30 TAB | Refills: 5 | Status: SHIPPED | OUTPATIENT
Start: 2019-10-17 | End: 2019-11-21 | Stop reason: SDUPTHER

## 2019-10-17 RX ORDER — BENAZEPRIL HYDROCHLORIDE 40 MG/1
40 TABLET ORAL
Qty: 90 TAB | Refills: 1 | Status: SHIPPED | OUTPATIENT
Start: 2019-10-17

## 2019-10-17 NOTE — PROGRESS NOTES
1. Have you been to the ER, urgent care clinic since your last visit? Hospitalized since your last visit? No    2. Have you seen or consulted any other health care providers outside of the [address] [place] [address] since your last visit? Include any pap smears or colon screening.  No

## 2019-10-22 LAB
ALBUMIN/CREAT UR: 175 MG/G CREAT (ref 0–30)
CREAT UR-MCNC: 73.7 MG/DL
MICROALBUMIN UR-MCNC: 129 UG/ML

## 2019-10-30 NOTE — PROGRESS NOTES
[facility]    CC: F/U for Chronic Disease Management    HPI:     DMII:  -Taking diabetic medication as prescribed  -Denies any side effects or issues with her medication  -Has been checking her blood sugar ar home. Log brought in for review. -Majority of FBS not at gaol of <130  -Not following a regular exercise regimen      HTN:  -Taking BP medication as prescribed  -Denies any side effects or issues with her medication  -Has been checking BP at home. Log brought in for review.   -Most home readings not at goal of <130/80      Microalbuminuria:  -Found on urine test done at medicare wellness visit on 5/7/2019  -Previous screening done on 5/7/2018 was negative/normal  -Has been taking benazepril regimen as prescribed  -Denies any side effects or issues with the medication      ROS: Positive items marked in RED  CON: fever, chills  Cardiovascular: palpitations, CP  Resp: SOB, cough  GI: nausea, vomiting, diarrhea  : dysuria, hematuria      Past Medical History:   Diagnosis Date    Diverticulosis     DM type 2 (diabetes mellitus, type 2) ([facility] [address] 2012    Goiter 1994    resolved    Hypercalcemia     nl PTH    Hyperlipidemia 2015    Hypertension 2010    Immunization refused     pt doesn't want any shots       Past Surgical History:   Procedure Laterality Date    HX CATARACT REMOVAL Bilateral 2000    Dr. Pradeep Dumont  2014     [address]    Patient states Total hysterectomy       Family History   Problem Relation Age of Onset    Cancer Father     Breast Cancer Sister        Social History     Socioeconomic History    Marital status: UNKNOWN     Spouse name: Not on file    Number of children: Not on file    Years of education: Not on file    Highest education level: Not on file   Occupational History    Occupation: retired nurse   Tobacco Use    Smoking status: Never Smoker    Smokeless tobacco: Never Used   Substance and Sexual Activity    Alcohol use: No     Alcohol/week: 0.0 standard drinks    Drug use: No    Sexual activity: Never     Partners: Male     Comment:        Allergies   Allergen Reactions    Codeine Other (comments)     Felt like a different person, per patient    Iodine Itching    Pcn [Penicillins] Itching    Sulfa (Sulfonamide Antibiotics) Unknown (comments)    Vicodin [Hydrocodone-Acetaminophen] Nausea Only         Current Outpatient Medications:     metFORMIN ER (GLUCOPHAGE XR) 500 mg tablet, Take 1 Tab by mouth daily (with dinner). , Disp: 30 Tab, Rfl: 5    benazepril (LOTENSIN) 40 mg tablet, Take 1 Tab by mouth nightly., Disp: 90 Tab, Rfl: 1    amLODIPine (NORVASC) 10 mg tablet, Take 1 Tab by mouth daily. , Disp: 90 Tab, Rfl: 2    acarbose (PRECOSE) 100 mg tablet, TAKE 1 TAB BY MOUTH THREE (3) TIMES DAILY (WITH MEALS). , Disp: 270 Tab, Rfl: 1    donepezil (ARICEPT) 5 mg tablet, TAKE 1 TABLET BY MOUTH EVERY DAY AT NIGHT, Disp: 90 Tab, Rfl: 0    atorvastatin (LIPITOR) 20 mg tablet, TAKE 1 TABLET BY MOUTH EVERY DAY, Disp: 90 Tab, Rfl: 1    fluticasone propionate (FLONASE) 50 mcg/actuation nasal spray, 2 Sprays by Both Nostrils route daily. , Disp: , Rfl:     multivit-min/folic [id number] (ALIVE WOMEN'S GUMMY VITAMINS PO), Take 1 Tab by mouth daily. , Disp: , Rfl:     alcohol swabs (ALCOHOL PREP PADS) padm, USE TO CLEAN FINGER TO TEST BLOOD SUGAR 1 TIME DAILY FOR DM II E11.9., Disp: 100 Pad, Rfl: 3    famotidine (PEPCID) 20 mg tablet, Take 1 Tab by mouth daily. , Disp: 90 Tab, Rfl: 1    SITagliptin (JANUVIA) 100 mg tablet, Take 1 Tab by mouth daily. , Disp: 90 Tab, Rfl: 1    glucose blood VI test strips (TRUE METRIX GLUCOSE TEST STRIP) strip, TEST BLOOD SUGAR ONE TIME DAILY FOR DM II E11.9, Disp: 100 Strip, Rfl: 3    Blood-Glucose Meter (TRUE METRIX AIR GLUCOSE METER) [facility], Use as directed to check home blood sugar for DM II E11.9, Disp: 1 Each, Rfl: 0    Blood Glucose Control, Low (TRUE METRIX LEVEL 1) soln, Use as directed to check home blood sugar for DM II E11.9, Disp: 1 Each, Rfl: 12    lancets (TRUEPLUS LANCETS) 33 gauge misc, Use daily to check blood sugar for DM II E11.9, Disp: 100 Lancet, Rfl: 3    omega-3 fatty acids-vitamin e (FISH OIL) 1,000 mg cap, Take 1 Cap by mouth daily. One capsule daily. , Disp: 60 Cap, Rfl: 5    traZODone (DESYREL) 50 mg tablet, TAKE 1 TABLET BY MOUTH EVERY DAY AT NIGHT, Disp: 90 Tab, Rfl: 2    Physical Exam:      BP (!) 162/96   Pulse 84   Temp 97.2 °F (36.2 °C) (Oral)   Resp 12   Ht 5' 2\" (1.575 m)   Wt 132 lb (59.9 kg)   SpO2 98%   BMI 24.14 kg/m²     General:  WD, WN, NAD, conversant  Eyes: sclera clear bilaterally, no discharge noted, eyelids normal in appearance  HENT: NCAT  Lungs: CTAB, normal respiratory effort and rate  CV: RRR, no MRGs  ABD: soft, non-tender, non-distended, normal bowel sounds  Skin: normal temperature, turgor, color, and texture  Psych: alert and oriented to person, place and situation, normal affect  Neuro: speech normal, moving all extremities    Results for Lisa Conklin (MRN [id number]):   Ref.  Range 10/17/2019 10:41   Hemoglobin A1c (POC) Latest Units: % 8.7       Assessment/Plan     DMII, Inadequately Controlled:  -HGBA1c not at goal of <8%  -Will start on metformin regimen  -F/U in one month      HTN, Inadequately Controlled:  -BP not at goal of <130/80  -Will increase amlodipine dose to 10 mg  -F/U in one month      Microalbuminuria:  -Likely 2/2 DMII and HTN  -Patient counseled on finding  -Will continue current benazepril regimen  -Repeat urine microalbumin/creatinine ordered  -F/U in one month        Indira Alex MD  10/17/2019

## 2019-11-07 DIAGNOSIS — E11.9 TYPE 2 DIABETES MELLITUS WITHOUT COMPLICATION, WITHOUT LONG-TERM CURRENT USE OF INSULIN (HCC): ICD-10-CM

## 2019-11-07 RX ORDER — SITAGLIPTIN 100 MG/1
TABLET, FILM COATED ORAL
Qty: 90 TAB | Refills: 1 | Status: SHIPPED | OUTPATIENT
Start: 2019-11-07

## 2019-11-21 ENCOUNTER — OFFICE VISIT (OUTPATIENT)
Dept: FAMILY MEDICINE CLINIC | Age: 84
End: 2019-11-21

## 2019-11-21 VITALS — BODY MASS INDEX: 24.29 KG/M2 | HEIGHT: 62 IN | WEIGHT: 132 LBS

## 2019-11-21 DIAGNOSIS — R80.9 MICROALBUMINURIA: ICD-10-CM

## 2019-11-21 DIAGNOSIS — E11.9 TYPE 2 DIABETES MELLITUS WITHOUT COMPLICATION, WITHOUT LONG-TERM CURRENT USE OF INSULIN (HCC): Primary | ICD-10-CM

## 2019-11-21 DIAGNOSIS — I10 ESSENTIAL HYPERTENSION WITH GOAL BLOOD PRESSURE LESS THAN 130/80: ICD-10-CM

## 2019-11-21 RX ORDER — HYDROCHLOROTHIAZIDE 25 MG/1
25 TABLET ORAL DAILY
Qty: 30 TAB | Refills: 2 | Status: SHIPPED | OUTPATIENT
Start: 2019-11-21 | End: 2019-12-26

## 2019-11-21 RX ORDER — METFORMIN HYDROCHLORIDE 500 MG/1
500 TABLET, EXTENDED RELEASE ORAL 2 TIMES DAILY
Qty: 30 TAB | Refills: 5 | Status: SHIPPED | OUTPATIENT
Start: 2019-11-21

## 2019-11-21 NOTE — PROGRESS NOTES
[facility]    CC: Follow-up for chronic disease management    HPI:     DMII:  -Got requested lab work  -Taking diabetic medication as prescribed. -Denies any side effects or issues with diabetic medication  -Checks blood sugar at home. Log brought in for review. All FBS in log are not at goal.  -Does not follow a regular exercise regimen  -Diet is unchanged since last visit       HTN:  -Got requested lab work  -Taking BP medication as prescribed. -Denies any issues or side effects with BP medication.  -Has been checking BP at home. Log brought in for review.  Most home reading are not at goal.  -Diet is unchanged since last visit  -Has not been following a regular exercise regimen        Microalbuminuria:  -Got requested urine test  -Taking benazepril as prescribed  -Denies any side effects or issues with medication  -Denies any urinary issues      ROS: Positive items marked in RED  CON: fever, chills  Cardiovascular: palpitations, CP  Resp: SOB, cough  GI: nausea, vomiting, diarrhea  : dysuria, hematuria      Past Medical History:   Diagnosis Date    Diverticulosis     DM type 2 (diabetes mellitus, type 2) ([facility] [address] 2012    Goiter 1994    resolved    Hypercalcemia     nl PTH    Hyperlipidemia 2015    Hypertension 2010    Immunization refused     pt doesn't want any shots       Past Surgical History:   Procedure Laterality Date    HX CATARACT REMOVAL Bilateral 2000    Dr. Terry Mcghee HX COLONOSCOPY  2014    HX [id number] [facility] [address] [facility]    Patient states Total hysterectomy       Family History   Problem Relation Age of Onset    Cancer Father     Breast Cancer Sister        Social History     Socioeconomic History    Marital status: UNKNOWN     Spouse name: Not on file    Number of children: Not on file    Years of education: Not on file    Highest education level: Not on file   Occupational History    Occupation: retired nurse   Tobacco Use    Smoking status: Never Smoker    Smokeless tobacco: Never Used   Substance and Sexual Activity    Alcohol use: No     Alcohol/week: 0.0 standard drinks    Drug use: No    Sexual activity: Never     Partners: Male     Comment:        Allergies   Allergen Reactions    Codeine Other (comments)     Felt like a different person, per patient    Iodine Itching    Pcn [Penicillins] Itching    Sulfa (Sulfonamide Antibiotics) Unknown (comments)    Vicodin [Hydrocodone-Acetaminophen] Nausea Only         Current Outpatient Medications:     JANUVIA 100 mg tablet, TAKE 1 TABLET BY MOUTH EVERY DAY, Disp: 90 Tab, Rfl: 1    traZODone (DESYREL) 50 mg tablet, TAKE 1 TABLET BY MOUTH EVERY DAY AT NIGHT, Disp: 90 Tab, Rfl: 2    metFORMIN ER (GLUCOPHAGE XR) 500 mg tablet, Take 1 Tab by mouth daily (with dinner). , Disp: 30 Tab, Rfl: 5    benazepril (LOTENSIN) 40 mg tablet, Take 1 Tab by mouth nightly., Disp: 90 Tab, Rfl: 1    amLODIPine (NORVASC) 10 mg tablet, Take 1 Tab by mouth daily. , Disp: 90 Tab, Rfl: 2    acarbose (PRECOSE) 100 mg tablet, TAKE 1 TAB BY MOUTH THREE (3) TIMES DAILY (WITH MEALS). , Disp: 270 Tab, Rfl: 1    donepezil (ARICEPT) 5 mg tablet, TAKE 1 TABLET BY MOUTH EVERY DAY AT NIGHT, Disp: 90 Tab, Rfl: 0    atorvastatin (LIPITOR) 20 mg tablet, TAKE 1 TABLET BY MOUTH EVERY DAY, Disp: 90 Tab, Rfl: 1    fluticasone propionate (FLONASE) 50 mcg/actuation nasal spray, 2 Sprays by Both Nostrils route daily. , Disp: , Rfl:     multivit-min/folic [id number] (ALIVE WOMEN'S GUMMY VITAMINS PO), Take 1 Tab by mouth daily. , Disp: , Rfl:     alcohol swabs (ALCOHOL PREP PADS) padm, USE TO CLEAN FINGER TO TEST BLOOD SUGAR 1 TIME DAILY FOR DM II E11.9., Disp: 100 Pad, Rfl: 3    famotidine (PEPCID) 20 mg tablet, Take 1 Tab by mouth daily. , Disp: 90 Tab, Rfl: 1    glucose blood VI test strips (TRUE METRIX GLUCOSE TEST STRIP) strip, TEST BLOOD SUGAR ONE TIME DAILY FOR DM II E11.9, Disp: 100 Strip, Rfl: 3   Blood-Glucose Meter (TRUE METRIX AIR GLUCOSE METER) [facility], Use as directed to check home blood sugar for DM II E11.9, Disp: 1 Each, Rfl: 0    Blood Glucose Control, Low (TRUE METRIX LEVEL 1) soln, Use as directed to check home blood sugar for DM II E11.9, Disp: 1 Each, Rfl: 12    lancets (TRUEPLUS LANCETS) 33 gauge misc, Use daily to check blood sugar for DM II E11.9, Disp: 100 Lancet, Rfl: 3    omega-3 fatty acids-vitamin e (FISH OIL) 1,000 mg cap, Take 1 Cap by mouth daily. One capsule daily. , Disp: 60 Cap, Rfl: 5    Physical Exam:      BP (P) 150/83   Temp (P) 97.5 °F (36.4 °C) (Oral)   Resp (P) 14   Ht 5' 2\" (1.575 m)   Wt 132 lb (59.9 kg)   SpO2 (P) 98%   BMI 24.14 kg/m²     General:  WD, WN, NAD, conversant  Eyes: sclera clear bilaterally, no discharge noted, eyelids normal in appearance  HENT: NCAT  Lungs: CTAB, normal respiratory effort and rate  CV: RRR, no MRGs  ABD: soft, non-tender, non-distended, normal bowel sounds  Skin: normal temperature, turgor, color, and texture  Psych: alert and oriented to person, place and situation, normal affect  Neuro: speech normal, moving all extremities, slow gait      Results for Deepika Avendaño (MRN [id number]):   Ref. Range 10/21/2019 09:00   Creatinine, urine Latest Ref Range: Not Estab. mg/dL 73.7   Microalbumin, urine Latest Ref Range: Not Estab. ug/mL 129.0   Microalbumin/Creat.  Ratio Latest Ref Range: 0.0 - 30.0 mg/g creat 175.0 (H)         Assessment/Plan     DMII, Inadequately Controlled:  -FBS not at goal of <130  -Will increase metformin regimen to BID  -F/U in one month        HTN, Inadequately Controlled:  -BP not at goal of <130/80  -Will start on 25 mg of HCTZ daily  -F/U in one month       Microalbuminuria, Stable:  -Will continue current benazepril regimen  -F/U in one month        Natalia Bond MD  11/21/2019, 8:08 AM

## 2019-11-21 NOTE — PROGRESS NOTES
1. Have you been to the ER, urgent care clinic since your last visit? Hospitalized since your last visit? No    2. Have you seen or consulted any other health care providers outside of the [address] [place] [address] since your last visit? Include any pap smears or colon screening.  No

## 2019-12-06 DIAGNOSIS — K21.9 GASTROESOPHAGEAL REFLUX DISEASE, ESOPHAGITIS PRESENCE NOT SPECIFIED: ICD-10-CM

## 2019-12-06 RX ORDER — FAMOTIDINE 20 MG/1
TABLET, FILM COATED ORAL
Qty: 90 TAB | Refills: 1 | Status: SHIPPED | OUTPATIENT
Start: 2019-12-06

## 2019-12-07 DIAGNOSIS — J31.0 RHINITIS, UNSPECIFIED TYPE: ICD-10-CM

## 2019-12-08 DIAGNOSIS — F03.90 DEMENTIA WITHOUT BEHAVIORAL DISTURBANCE, UNSPECIFIED DEMENTIA TYPE: ICD-10-CM

## 2019-12-08 RX ORDER — FLUTICASONE PROPIONATE 50 MCG
SPRAY, SUSPENSION (ML) NASAL
Qty: 48 G | Refills: 2 | Status: SHIPPED | OUTPATIENT
Start: 2019-12-08

## 2019-12-08 RX ORDER — DONEPEZIL HYDROCHLORIDE 5 MG/1
TABLET, FILM COATED ORAL
Qty: 90 TAB | Refills: 0 | Status: SHIPPED | OUTPATIENT
Start: 2019-12-08

## 2019-12-16 ENCOUNTER — PATIENT OUTREACH (OUTPATIENT)
Dept: FAMILY MEDICINE CLINIC | Age: 84
End: 2019-12-16

## 2019-12-16 NOTE — PROGRESS NOTES
Hospital Discharge Follow-Up      Date/Time:  2019 11:15 AM    Patient was admitted to [facility] on 19 and discharged on 19 for acute pancreatitis. The physician discharge summary was available at the time of outreach. Patient was contacted within 3 business days of discharge. Top Challenges reviewed with the provider   Patient's daughter reports :   Patient having swallowing problems - holding food in her mouth for a long time.  Refuses to wear dentures - patient states they hurt her mouth but also refuses to be fitted for new dentures   Daughter has a hard time giving he mother new medications - patient is resisting taking new medicines. Need frequent reminders of why she is taking the medication. More difficult to get patient to take new medicines at night than during the day   Patient often complains that we are giving her too many medications   Personal care aid from 7-5 Monday - Saturday  Patient's daughter Denies :   Fever   Complaints of abdominal pain    Advance Care Planning:   Does patient have an Advance Directive:  reviewed and current          Was this a readmission? no   Patient stated reason for the readmission: n/a    Care Transition Nurse (CTN) contacted the caregiver by telephone to perform post hospital discharge assessment. Verified name and  with caregiver as identifiers. Provided introduction to self, and explanation of the CTN role. Caregiver received hospital discharge instructions. CTN reviewed discharge instructions and red flags with caregiver who verbalized understanding. Caregiver given an opportunity to ask questions and does not have any further questions or concerns at this time. The caregiver agrees to contact the PCP office for questions related to their healthcare. CTN provided contact information for future reference.     Patients top risk factors for readmission:  functional cognitive ability, medication management, PCP relationship    Home Health orders at discharge: [address], resumption of care      Durable Medical Equipment ordered at discharge: none      Medication(s):   New Medications at Discharge: atorvastatin, Aricept, trazodone  Changed Medications at Discharge: none  Discontinued Medications at Discharge: fluvastatin, lorazepam, simvastatin    Medication reconciliation was performed with caregiver, who verbalizes understanding of administration of home medications. There were no barriers to obtaining medications identified at this time. Referral to Pharm D needed: no     Current Outpatient Medications   Medication Sig    donepezil (ARICEPT) 5 mg tablet TAKE 1 TABLET BY MOUTH EVERY DAY EVERY NIGHT    famotidine (PEPCID) 20 mg tablet TAKE 1 TABLET BY MOUTH EVERY DAY    JANUVIA 100 mg tablet TAKE 1 TABLET BY MOUTH EVERY DAY    traZODone (DESYREL) 50 mg tablet TAKE 1 TABLET BY MOUTH EVERY DAY AT NIGHT    benazepril (LOTENSIN) 40 mg tablet Take 1 Tab by mouth nightly.  amLODIPine (NORVASC) 10 mg tablet Take 1 Tab by mouth daily.  acarbose (PRECOSE) 100 mg tablet TAKE 1 TAB BY MOUTH THREE (3) TIMES DAILY (WITH MEALS).  atorvastatin (LIPITOR) 20 mg tablet TAKE 1 TABLET BY MOUTH EVERY DAY    multivit-min/folic ONTG/LIN619 (ALIVE WOMEN'S GUMMY VITAMINS PO) Take 1 Tab by mouth daily.  omega-3 fatty acids-vitamin e (FISH OIL) 1,000 mg cap Take 1 Cap by mouth daily. One capsule daily.  fluticasone propionate (FLONASE) 50 mcg/actuation nasal spray SPRAY 2 SPRAYS INTO EACH NOSTRIL EVERY DAY    metFORMIN ER (GLUCOPHAGE XR) 500 mg tablet Take 1 Tab by mouth two (2) times a day.  hydroCHLOROthiazide (HYDRODIURIL) 25 mg tablet Take 1 Tab by mouth daily.     alcohol swabs (ALCOHOL PREP PADS) padm USE TO CLEAN FINGER TO TEST BLOOD SUGAR 1 TIME DAILY FOR DM II E11.9.    glucose blood VI test strips (TRUE METRIX GLUCOSE TEST STRIP) strip TEST BLOOD SUGAR ONE TIME DAILY FOR DM II E11.9    Blood-Glucose Meter (TRUE METRIX AIR GLUCOSE METER) misc Use as directed to check home blood sugar for DM II E11.9    Blood Glucose Control, Low (TRUE METRIX LEVEL 1) soln Use as directed to check home blood sugar for DM II E11.9    lancets (TRUEPLUS LANCETS) 33 gauge misc Use daily to check blood sugar for DM II E11.9     No current facility-administered medications for this visit. There are no discontinued medications. [facility] follow up appointment(s):   Future Appointments   Date Time Provider Adelia Rey   12/17/2019 11:30 AM Braxton Stevenson NP [address] [facility] [address]   1/7/2020  9:00 AM Radha Rodriguez MD [facility] [address]   5/7/2020 11:30 AM MD LOREN Whitlock      Non-[facility] follow up appointment(s): neurology appointment Dr Zara Henry 12/17/19 @1330    Goals      Establish PCP relationships and regularly scheduled appointments. 3/20/2018 Nurse Navigator discussed with patient and her daughter Matthias Mcfarlane importance of having one PCP for safety reason,and continuation of medical plan. Patient decided she stay with [facility]. Patient will call when she feels she needs to be seen for sick visit and not walk into the office asking to speak with the physician. Patient is to discusS current medications with PCP, Mike Arzola and bring all medication bottles to her appointments. 12/16/2019 patient's daughter Matthias Mcfarlane stated that patient would like a new [facility] doctor but was unable to tell me why her mother wanted to change. Stated she could ask her mother why she wanted the change       Prevent complications post hospitalization. 1. CTN will monitor X 4 weeks    2. Ensure provider appt is scheduled within 7 days post-discharge    3. Confirm patient attended post-discharge provider apt    4. Complete post-visit call to confirm attendance and update care needs      5. Review/educate common or potential \"red flags\" of condition worsening    6.  Evaluate adherence to medications and priority barriers to resolve        7. Instruct on adherence to medications as ordered and assess for therapeutic response and side-effects         8. Discuss and evaluate ADL performance. Provide recommendations on energy conservation, particularly related to transition home from an inpatient admission.

## 2019-12-16 NOTE — PROGRESS NOTES
Returned daughter's call. Daughter Kelly Bui stated that she talked to the patient and her mother refuses to go back to any [facility] PCP. Patient has changed her primary care over to Dr. Lindsay Potter and will be switching to [facility]. Daughter had me cancel all future appointments at [address] [facility] which I did through [place].   CTN made herself available to daughter in the future and will continue to follow for 30 days

## 2019-12-20 ENCOUNTER — TELEPHONE (OUTPATIENT)
Dept: FAMILY MEDICINE CLINIC | Age: 84
End: 2019-12-20

## 2019-12-20 ENCOUNTER — PATIENT OUTREACH (OUTPATIENT)
Dept: FAMILY MEDICINE CLINIC | Age: 84
End: 2019-12-20

## 2019-12-20 NOTE — PROGRESS NOTES
Daughter called concerned that blood sugars have been 110 this morning and 130 yesterday. Daughter was worried that theses were too low. CTN assured her that those were good bloodsugars and not to worry. Daughter also reports that mother is now eating and taking her medications. Daughter ahs started crushing medications and placing them in pudding.

## 2019-12-20 NOTE — TELEPHONE ENCOUNTER
Due to patient's confusion and memory loss daughter is having to crush her medications at times to get her to take them. Current dose of metformin is not crushable please call daughter to advise.

## 2019-12-23 ENCOUNTER — PATIENT OUTREACH (OUTPATIENT)
Dept: FAMILY MEDICINE CLINIC | Age: 84
End: 2019-12-23

## 2019-12-23 NOTE — PROGRESS NOTES
Daughter called and stated on voicemail that her mother went to sleep specialist. They referred her back to PCP for CPAP order. CTN called daughter and left VM reminding her of a call on 12/16/19 where she stated that her mother refused to see Dr. Guillermo Ochoa again and canceled her follow up appointments. CTN asked Sivakumar Cortes to call me back ASAP to work through this problem.

## 2019-12-24 ENCOUNTER — PATIENT OUTREACH (OUTPATIENT)
Dept: FAMILY MEDICINE CLINIC | Age: 84
End: 2019-12-24

## 2019-12-24 NOTE — PROGRESS NOTES
Called to say that her mothers blood sugar was 91 this morning. CTN called daughter back to assure her that 80 was right where we want her mothers blood sugar to be. Daughter was encouraged to continue what she was doing as it was working. Patient still refuses to be seen at [facility] and daughter Angelito Nelson understands that CPAP can not be ordered with out follow up with a PCP.   Raina Lofton is working to find a provider that her mother is willing to see

## 2019-12-26 ENCOUNTER — OFFICE VISIT (OUTPATIENT)
Dept: FAMILY MEDICINE CLINIC | Age: 84
End: 2019-12-26

## 2019-12-26 VITALS
HEART RATE: 78 BPM | DIASTOLIC BLOOD PRESSURE: 71 MMHG | WEIGHT: 132 LBS | BODY MASS INDEX: 24.29 KG/M2 | SYSTOLIC BLOOD PRESSURE: 124 MMHG | RESPIRATION RATE: 14 BRPM | HEIGHT: 62 IN | OXYGEN SATURATION: 98 % | TEMPERATURE: 97.8 F

## 2019-12-26 DIAGNOSIS — L89.322 PRESSURE INJURY OF LEFT BUTTOCK, STAGE 2 (HCC): ICD-10-CM

## 2019-12-26 DIAGNOSIS — I10 ESSENTIAL HYPERTENSION WITH GOAL BLOOD PRESSURE LESS THAN 130/80: Primary | ICD-10-CM

## 2019-12-26 DIAGNOSIS — K85.90 ACUTE PANCREATITIS, UNSPECIFIED COMPLICATION STATUS, UNSPECIFIED PANCREATITIS TYPE: ICD-10-CM

## 2019-12-26 DIAGNOSIS — R53.81 PHYSICAL DECONDITIONING: ICD-10-CM

## 2019-12-26 DIAGNOSIS — N39.0 URINARY TRACT INFECTION WITHOUT HEMATURIA, SITE UNSPECIFIED: ICD-10-CM

## 2019-12-26 NOTE — PROGRESS NOTES
Terri Armstrong    CC: Bed Sore    HPI:   Hospital note, labs and imaging reviewed during visit and summarized as noted below    Pancreatitis and UTI:  -Went to ED for evaluation on 12/6/2019 due to syncope  -Was admitted to hospital for further evaluation  -During evaluation she is found to have gastroenteritis, UTI secondary to Proteus mirabilis, and mild uncomplicated acute pancreatitis (Lipase was minimally elevated at cigar)  -Episode of syncope was thought to be secondary to a vasovagal reaction due to passing loose stools and having abdominal pain  -She was discharged on 12/11/2019 on a Keflex and ciprofloxacin regimen and her HCTZ was discontinued  -Patient reports she feels notably better and denies having any symptoms of a UTI or pancreatitis      Bed Sore:  - Timing/onset: Yesterday  - Duration: Constant  - Location: Left buttock  - Quality: Non painful and closed  - Progression/Course: Unchanged  - Associated Symptoms/signs: itchess      HTN:  -Reports that HCTZ was stopped due to pancreatitis (Had only taken 1 tablet of the medication)  -Has been checking blood pressure at home. No log brought in today. -Reports systolic blood pressure has been in 130s  -Not following any regular exercise regimen      ROS: Positive items marked in RED  CON: fever, chills  Cardiovascular: palpitations, CP  Resp: SOB, cough  GI: nausea, vomiting, diarrhea  : dysuria, hematuria      Past Medical History:   Diagnosis Date    Dementia ([facility] [address]     MOCA score of 24 on 1/30/2019    Diverticulosis     DM type 2 (diabetes mellitus, type 2) ([facility] [address] 2012    Goiter 1994    resolved    Hypercalcemia     nl PTH    Hyperlipidemia 2015    Hypertension 2010    Immunization refused     pt doesn't want any shots    Poor compliance with medication     Patient regularly will discontinue taking medications for a variety of reasons.        Past Surgical History:   Procedure Laterality Date    HX CATARACT REMOVAL Bilateral 2000    Dr. Kathy Rey  2014    [facility]    Patient states Total hysterectomy       Family History   Problem Relation Age of Onset    Cancer Father     Breast Cancer Sister        Social History     Socioeconomic History    Marital status:      Spouse name: Not on file    Number of children: Not on file    Years of education: Not on file    Highest education level: Not on file   Occupational History    Occupation: retired nurse   Tobacco Use    Smoking status: Never Smoker    Smokeless tobacco: Never Used   Substance and Sexual Activity    Alcohol use: No     Alcohol/week: 0.0 standard drinks    Drug use: No    Sexual activity: Never     Partners: Male     Comment:        Allergies   Allergen Reactions    Codeine Other (comments)     Felt like a different person, per patient    Iodine Itching    Pcn [Penicillins] Itching    Sulfa (Sulfonamide Antibiotics) Unknown (comments)    Vicodin [Hydrocodone-Acetaminophen] Nausea Only         Current Outpatient Medications:     fluticasone propionate (FLONASE) 50 mcg/actuation nasal spray, SPRAY 2 SPRAYS INTO EACH NOSTRIL EVERY DAY, Disp: 48 g, Rfl: 2    donepezil (ARICEPT) 5 mg tablet, TAKE 1 TABLET BY MOUTH EVERY DAY EVERY NIGHT, Disp: 90 Tab, Rfl: 0    famotidine (PEPCID) 20 mg tablet, TAKE 1 TABLET BY MOUTH EVERY DAY, Disp: 90 Tab, Rfl: 1    metFORMIN ER (GLUCOPHAGE XR) 500 mg tablet, Take 1 Tab by mouth two (2) times a day., Disp: 30 Tab, Rfl: 5    JANUVIA 100 mg tablet, TAKE 1 TABLET BY MOUTH EVERY DAY, Disp: 90 Tab, Rfl: 1    traZODone (DESYREL) 50 mg tablet, TAKE 1 TABLET BY MOUTH EVERY DAY AT NIGHT, Disp: 90 Tab, Rfl: 2    benazepril (LOTENSIN) 40 mg tablet, Take 1 Tab by mouth nightly., Disp: 90 Tab, Rfl: 1    amLODIPine (NORVASC) 10 mg tablet, Take 1 Tab by mouth daily. , Disp: 90 Tab, Rfl: 2    acarbose (PRECOSE) 100 mg tablet, TAKE 1 TAB BY MOUTH THREE (3) TIMES DAILY (WITH MEALS). , Disp: 270 Tab, Rfl: 1    atorvastatin (LIPITOR) 20 mg tablet, TAKE 1 TABLET BY MOUTH EVERY DAY, Disp: 90 Tab, Rfl: 1    multivit-min/folic WFEY/RUQ579 (ALIVE WOMEN'S GUMMY VITAMINS PO), Take 1 Tab by mouth daily. , Disp: , Rfl:     alcohol swabs (ALCOHOL PREP PADS) padm, USE TO CLEAN FINGER TO TEST BLOOD SUGAR 1 TIME DAILY FOR DM II E11.9., Disp: 100 Pad, Rfl: 3    glucose blood VI test strips (TRUE METRIX GLUCOSE TEST STRIP) strip, TEST BLOOD SUGAR ONE TIME DAILY FOR DM II E11.9, Disp: 100 Strip, Rfl: 3    Blood-Glucose Meter (TRUE METRIX AIR GLUCOSE METER) [facility], Use as directed to check home blood sugar for DM II E11.9, Disp: 1 Each, Rfl: 0    Blood Glucose Control, Low (TRUE METRIX LEVEL 1) soln, Use as directed to check home blood sugar for DM II E11.9, Disp: 1 Each, Rfl: 12    lancets (TRUEPLUS LANCETS) 33 gauge misc, Use daily to check blood sugar for DM II E11.9, Disp: 100 Lancet, Rfl: 3    omega-3 fatty acids-vitamin e (FISH OIL) 1,000 mg cap, Take 1 Cap by mouth daily. One capsule daily. , Disp: 60 Cap, Rfl: 5    Physical Exam:      /71   Pulse 78   Temp 97.8 °F (36.6 °C) (Oral)   Resp 14   Ht 5' 2\" (1.575 m)   Wt 132 lb (59.9 kg)   SpO2 98%   BMI 24.14 kg/m²     General:  WD, WN, NAD, sitting in wheelchair, minimally conversant  Eyes: sclera clear bilaterally, no discharge noted, eyelids normal in appearance  HENT: NCAT  Lungs: CTAB, normal respiratory effort and rate  CV: RRR, no MRGs  ABD: soft, non-tender, non-distended, normal bowel sounds  Ext: no peripheral edema or digital cyanosis noted  Skin: left buttock small area of skin breakdown that is grayish in color.  Area is mildly tender to palpation  Psych: alert and oriented to person, place and situation, normal affect  Neuro: speech normal, moving all extremities    Assessment/Plan     HTN:  -BP currently at goal of less than 130/80  -No indication currently to adjust BP medication, although reported home readings not at goal  -Will continue current blood pressure medication regimen  -Patient will follow-up with her new PCP for HTN management      Acute Pancreatitis:  -Unlikely to be secondary to HCTZ, as she had only taken the medication one time  -Suspect she had a viral infection (Ex: coxsackievirus B virus) that caused her issue as she had also presented with signs/symptoms of gastroenteritis  -Side effects of Januvia regimen is also a more likely cause as she had been on the medication for longer period of time  -Patient will follow-up with her new PCP if she develops any signs or symptoms of pancreatitis      UTI, Likely Resolved:  -Patient advised to complete antibiotic regimen as prescribed  -Patient will follow-up with her new PCP if issue worsens or fails to improve      Pressure Ulcer of Left Buttock, Stage II:  -Appears the same as pressure ulcer on left buttocks noted on 5/2019.  See photo of ulcer from that time in media section  -Referred for wound care/home health  -Patient will follow-up with her new PCP if issue worsens or fails to improve        Brian Virgen MD  12/26/2019, 1:06 PM

## 2019-12-26 NOTE — PROGRESS NOTES
1. Have you been to the ER, urgent care clinic since your last visit? Hospitalized since your last visit? Yes When: 12-6-19 to 12-11-19 for Pancreatitis and UTI Luisana Hanson    2. Have you seen or consulted any other health care providers outside of the [address] [place] [address] since your last visit? Include any pap smears or colon screening.  No

## 2019-12-27 ENCOUNTER — PATIENT OUTREACH (OUTPATIENT)
Dept: FAMILY MEDICINE CLINIC | Age: 84
End: 2019-12-27

## 2019-12-27 ENCOUNTER — HOME HEALTH ADMISSION (OUTPATIENT)
Dept: HOME HEALTH SERVICES | Facility: HOME HEALTH | Age: 84
End: 2019-12-27

## 2019-12-27 NOTE — PROGRESS NOTES
Returned voice mail message from patient's daughter Reuben Tariq. Daughter reports that patient has pressure ulcer on he left buttock and that she saw Dr. Alem Goel yesterday. Home health has been ordered for wound care.

## 2023-05-03 NOTE — PROGRESS NOTES
Transitional Care Follow up         Discussed with Cristo lOson, 4/6/18 conversation with patient and [facility] pharmacist. He would like for patient to stay on Januvia. Spoke with [facility] Pharmacist, Dr.Alex Darrall Kocher called in a Rx for Jardiance with 5 refills, they did not have it in stock, Rx was transferred to [facility]. , discussed with Cristo Olson, who does not want patient on Jardiance. 4/7/17 Neurology note given to Cristo Olson. Called patient, we discussed need to stay with one provider, concerns for safety with her using two PCPs, Cristo Whitney does not want her to be on Jardiance but it is her choice to choose PCP and Treatment plan but reinforced we can no longer see her if she continues to seek care with . Patient requesting FU with Cristo Olson today or tomorrow, will discuss with Cristo Olson and call her back. Left message with Jigna with Dr. Amy Edward, requesting 4/2/18 note to be faxed. Topical Clindamycin Pregnancy And Lactation Text: This medication is Pregnancy Category B and is considered safe during pregnancy. It is unknown if it is excreted in breast milk.

## 2023-07-16 NOTE — PATIENT INSTRUCTIONS
Dementia: Care Instructions  Your Care Instructions    Dementia is a loss of mental skills that affects your daily life. It is different than the occasional trouble with memory that is part of aging. You may find it hard to remember things that you feel you should be able to remember. Or you may feel that your mind is just not working as well as usual.  Finding out that you have dementia is a shock. You may be afraid and worried about how the condition will change your life. Although there is no cure at this time, medicine may slow memory loss and improve thinking for a while. Other medicines may be able to help you sleep or cope with depression and behavior changes. Dementia often gets worse slowly. But it can get worse quickly. As dementia gets worse, it may become harder to do common things that take planning, like making a list and going shopping. Over time, the disease may make it hard for you to take care of yourself. Some people with dementia need others to help care for them. Dementia is different for everyone. You may be able to function well for a long time. In the early stage of the condition, you can do things at home to make life easier and safer. You also can keep doing your hobbies and other activities. Many people find comfort in planning now for their future needs. Follow-up care is a key part of your treatment and safety. Be sure to make and go to all appointments, and call your doctor if you are having problems. It's also a good idea to know your test results and keep a list of the medicines you take. How can you care for yourself at home? · Take your medicines exactly as prescribed. Call your doctor if you think you are having a problem with your medicine. · Eat healthy foods. Eat lots of whole grains, fruits, and vegetables every day.  If you are not hungry, try snacks or nutritional drinks such as Boost, Ensure, or Sustacal.  · If you have problems sleeping:  ¨ Try not to nap too Pt requested lidoderm patch for back. PRN lidoderm patch apply to back @ 1003. close to your bedtime. ¨ Exercise regularly. Walking is a good choice. ¨ Try a glass of warm milk or caffeine-free herbal tea before bed. · Do tasks and activities during the time of day when you feel your best. It may help to develop a daily routine. · Post labels, lists, and sticky notes to help you remember things. Write your activities on a calendar you can easily find. Put your clock where you can easily see it. · Stay active. Take walks in familiar places, or with friends or loved ones. Try to stay active mentally too. Read and work crossword puzzles if you enjoy these activities. · Do not drive unless you can pass an on-road driving test. If you are not sure if you are safe to drive, your state 's license bureau can test you. · Keep a cordless phone and a flashlight with new batteries by your bed. If possible, put a phone in each of the main rooms of your house, or carry a cell phone in case you fall and cannot reach a phone. Or, you can wear a device around your neck or wrist. You push a button that sends a signal for help. Acknowledge your emotions and plan for the future  · Talk openly and honestly with your doctor. · Let yourself grieve. It is common to feel angry, scared, frustrated, anxious, or depressed. · Get emotional support from family, friends, a support group, or a counselor experienced in working with people who have dementia. · Ask for help if you need it. · Plan for the future. ¨ Talk to your family and doctor about preparing a living will and other important papers while you can make decisions. These papers tell your doctors how to care for you at the end of your life. ¨ Consider naming a person to make decisions about your care if you are not able to. When should you call for help? Call 911 anytime you think you may need emergency care. For example, call if:  ? · You are lost and do not know whom to call. ? · You are injured and do not know whom to call.    ?Call your doctor now or seek immediate medical care if:  ? · You are more confused or upset than usual.   ? · You feel like you could hurt yourself because your mind is not working well. ? Watch closely for changes in your health, and be sure to contact your doctor if you have any problems. Where can you learn more? Go to http://cori-rojsa.info/. Enter C673 in the search box to learn more about \"Dementia: Care Instructions. \"  Current as of: May 12, 2017  Content Version: 11.4  © 1151-6070 [employer]. Care instructions adapted under license by [email or web address] (which disclaims liability or warranty for this information). If you have questions about a medical condition or this instruction, always ask your healthcare professional. [address] any warranty or liability for your use of this information. Helping A Person With Dementia: Care Instructions  Your Care Instructions    Dementia is a loss of mental skills that affects daily life. It is different from mild memory loss that occurs with aging. Dementia can cause problems with memory, thinking clearly, and planning. It is different for everyone. But it usually gets worse slowly. Some people who have dementia can function well for a long time. But at some point it may become hard for the person to care for himself or herself. It can be upsetting to learn that a loved one has this condition. You may be afraid and worried about what will happen. You may wonder how you will care for the person. There is no cure for dementia. But medicine may be able to slow memory loss and improve thinking for a while. Other medicines may help with sleep, depression, and behavior changes. Dementia is different for everyone. In some cases, people can function well for a long time. You can help your loved one by making his or her home life easier and safer. You also need to take care of yourself.  Caregiving can be stressful. But support is available to help you and give you a break when you need it. The [employer] offers good information and support. If you are caring for someone with dementia, you can help make life safer and more comfortable. You can also help your loved one make decisions about future care. You may also want to bring up legal and financial issues. These are hard but important conversations to have. Follow-up care is a key part of your loved one's treatment and safety. Be sure to make and go to all appointments, and call your doctor if your loved one is having problems. It's also a good idea to know your loved one's test results and keep a list of the medicines he or she takes. How can you care for your loved one at home? Taking care of the person  · If the person takes medicine for dementia, help him or her take it exactly as prescribed. Call the doctor if you notice any problems with the medicine. · Make a list of the person's medicines. Review it with all of his or her doctors. · Help the person eat a balanced diet. Serve plenty of whole grains, fruits, and vegetables every day. If the person is not hungry at mealtimes, give snacks at midmorning and in the afternoon. Offer drinks such as Boost, Ensure, or Sustacal if the person is losing weight. · Encourage exercise. Walking and other activities may slow the decline of mental ability. Help the person stay active mentally with reading, crossword puzzles, or other hobbies. · Take steps to help if the person is sundowning. This is the restless behavior and trouble with sleeping that may occur in late afternoon and at night. Try not to let the person nap during the day. Offer a glass of warm milk or caffeine-free tea before bedtime. · Develop a routine. Your loved one will feel less frustrated or confused with a clear, simple plan of what to do every day. · Be patient. A task may take the person longer than it used to.   · For as long as he or she is able, allow your loved one to make decisions about activities, food, clothing, and other choices. Let him or her be independent, even if tasks take more time or are not done perfectly. Tailor tasks to the person's abilities. For example, if cooking is no longer safe, ask for other help. Your loved one can help set the table, or make simple dishes such as a salad. When the person needs help, offer it gently. Staying safe  · Make your home (or your loved one's home) safe. Tack down rugs, and put no-slip tape in the tub. Install handrails, and put safety switches on stoves and appliances. Keep rooms free of clutter. Make sure walkways around furniture are clear. Do not move furniture around, because the person may become confused. · Use locks on doors and cupboards. Lock up knives, scissors, medicines, cleaning supplies, and other dangerous things. · Do not let the person drive or cook if he or she can't do it safely. A person with dementia should not drive unless he or she is able to pass an on-road driving test. Your state 's license bureau can do a driving test if there is any question. · Get medical alert jewelry for the person so that you can be contacted if he or she wanders away. If possible, provide a safe place for wandering, such as an enclosed yard or garden. Taking care of yourself  · Ask your doctor about support groups and other resources in your area. · Take care of your health. Be sure to eat healthy foods and get enough rest and exercise. · Take time for yourself. Respite services provide someone to stay with the person for a short time while you get out of the house for a few hours. · Make time for an activity that you enjoy. Read, listen to music, paint, do crafts, or play an instrument, even if it's only for a few minutes a day. · Spend time with family, friends, and others in your support system. When should you call for help?   Call 911 anytime you think the person may need emergency care. For example, call if:  ? · The person who has dementia wanders away and you can't find him or her. ? · The person who has dementia is seriously injured. ?Call the doctor now or seek immediate medical care if:  ? · The person suddenly sees things that are not there (hallucinates). ? · The person has a sudden change in his or her behavior. ? Watch closely for changes in the person's health, and be sure to contact the doctor if:  ? · The person has symptoms that could cause injury. ? · The person has problems with his or her medicine. ? · You need more information to care for a person with dementia. ? · You need respite care so you can take a break. Where can you learn more? Go to http://cori-rojas.info/. Enter Y307 in the search box to learn more about \"Helping A Person With Dementia: Care Instructions. \"  Current as of: May 12, 2017  Content Version: 11.4  © 6263-4359 [employer]. Care instructions adapted under license by [employer] (which disclaims liability or warranty for this information). If you have questions about a medical condition or this instruction, always ask your healthcare professional. [address] any warranty or liability for your use of this information. Learning About Diabetes Food Guidelines  Your Care Instructions    Meal planning is important to manage diabetes. It helps keep your blood sugar at a target level (which you set with your doctor). You don't have to eat special foods. You can eat what your family eats, including sweets once in a while. But you do have to pay attention to how often you eat and how much you eat of certain foods. You may want to work with a dietitian or a certified diabetes educator (CDE) to help you plan meals and snacks. A dietitian or CDE can also help you lose weight if that is one of your goals. What should you know about eating carbs?   Managing the amount of carbohydrate (carbs) you eat is an important part of healthy meals when you have diabetes. Carbohydrate is found in many foods. · Learn which foods have carbs. And learn the amounts of carbs in different foods. ¨ Bread, cereal, pasta, and rice have about 15 grams of carbs in a serving. A serving is 1 slice of bread (1 ounce), ½ cup of cooked cereal, or 1/3 cup of cooked pasta or rice. ¨ Fruits have 15 grams of carbs in a serving. A serving is 1 small fresh fruit, such as an apple or orange; ½ of a banana; ½ cup of cooked or canned fruit; ½ cup of fruit juice; 1 cup of melon or raspberries; or 2 tablespoons of dried fruit. ¨ Milk and no-sugar-added yogurt have 15 grams of carbs in a serving. A serving is 1 cup of milk or 2/3 cup of no-sugar-added yogurt. ¨ Starchy vegetables have 15 grams of carbs in a serving. A serving is ½ cup of mashed potatoes or sweet potato; 1 cup winter squash; ½ of a small baked potato; ½ cup of cooked beans; or ½ cup cooked corn or green peas. · Learn how much carbs to eat each day and at each meal. A dietitian or CDE can teach you how to keep track of the amount of carbs you eat. This is called carbohydrate counting. · If you are not sure how to count carbohydrate grams, use the Plate Method to plan meals. It is a good, quick way to make sure that you have a balanced meal. It also helps you spread carbs throughout the day. ¨ Divide your plate by types of foods. Put non-starchy vegetables on half the plate, meat or other protein food on one-quarter of the plate, and a grain or starchy vegetable in the final quarter of the plate. To this you can add a small piece of fruit and 1 cup of milk or yogurt, depending on how many carbs you are supposed to eat at a meal.  · Try to eat about the same amount of carbs at each meal. Do not \"save up\" your daily allowance of carbs to eat at one meal.  · Proteins have very little or no carbs per serving.  Examples of proteins are beef, chicken, turkey, fish, eggs, tofu, cheese, cottage cheese, and peanut butter. A serving size of meat is 3 ounces, which is about the size of a deck of cards. Examples of meat substitute serving sizes (equal to 1 ounce of meat) are 1/4 cup of cottage cheese, 1 egg, 1 tablespoon of peanut butter, and ½ cup of tofu. How can you eat out and still eat healthy? · Learn to estimate the serving sizes of foods that have carbohydrate. If you measure food at home, it will be easier to estimate the amount in a serving of restaurant food. · If the meal you order has too much carbohydrate (such as potatoes, corn, or baked beans), ask to have a low-carbohydrate food instead. Ask for a salad or green vegetables. · If you use insulin, check your blood sugar before and after eating out to help you plan how much to eat in the future. · If you eat more carbohydrate at a meal than you had planned, take a walk or do other exercise. This will help lower your blood sugar. What else should you know? · Limit saturated fat, such as the fat from meat and dairy products. This is a healthy choice because people who have diabetes are at higher risk of heart disease. So choose lean cuts of meat and nonfat or low-fat dairy products. Use olive or canola oil instead of butter or shortening when cooking. · Don't skip meals. Your blood sugar may drop too low if you skip meals and take insulin or certain medicines for diabetes. · Check with your doctor before you drink alcohol. Alcohol can cause your blood sugar to drop too low. Alcohol can also cause a bad reaction if you take certain diabetes medicines. Follow-up care is a key part of your treatment and safety. Be sure to make and go to all appointments, and call your doctor if you are having problems. It's also a good idea to know your test results and keep a list of the medicines you take. Where can you learn more? Go to http://cori-rojas.info/.   Enter B085 in the search box to learn more about \"Learning About Diabetes Food Guidelines. \"  Current as of: March 13, 2017  Content Version: 11.4  © 1681-1762 [employer]. Care instructions adapted under license by [employer] (which disclaims liability or warranty for this information). If you have questions about a medical condition or this instruction, always ask your healthcare professional. [address] any warranty or liability for your use of this information. Learning About Meal Planning for Diabetes  Why plan your meals? Meal planning can be a key part of managing diabetes. Planning meals and snacks with the right balance of carbohydrate, protein, and fat can help you keep your blood sugar at the target level you set with your doctor. You don't have to eat special foods. You can eat what your family eats, including sweets once in a while. But you do have to pay attention to how often you eat and how much you eat of certain foods. You may want to work with a dietitian or a certified diabetes educator. He or she can give you tips and meal ideas and can answer your questions about meal planning. This health professional can also help you reach a healthy weight if that is one of your goals. What plan is right for you? Your dietitian or diabetes educator may suggest that you start with the plate format or carbohydrate counting. The plate format  The plate format is a simple way to help you manage how you eat. You plan meals by learning how much space each food should take on a plate. Using the plate format helps you spread carbohydrate throughout the day. It can make it easier to keep your blood sugar level within your target range. It also helps you see if you're eating healthy portion sizes. To use the plate format, you put non-starchy vegetables on half your plate. Add meat or meat substitutes on one-quarter of the plate.  Put a grain or starchy vegetable (such as Garth Peer rice or a potato) on the final quarter of the plate. You can add a small piece of fruit and some low-fat or fat-free milk or yogurt, depending on your carbohydrate goal for each meal.  Here are some tips for using the plate format:  · Make sure that you are not using an oversized plate. A 9-inch plate is best. Many restaurants use larger plates. · Get used to using the plate format at home. Then you can use it when you eat out. · Write down your questions about using the plate format. Talk to your doctor, a dietitian, or a diabetes educator about your concerns. Carbohydrate counting  With carbohydrate counting, you plan meals based on the amount of carbohydrate in each food. Carbohydrate raises blood sugar higher and more quickly than any other nutrient. It is found in desserts, breads and cereals, and fruit. It's also found in starchy vegetables such as potatoes and corn, grains such as rice and pasta, and milk and yogurt. Spreading carbohydrate throughout the day helps keep your blood sugar levels within your target range. Your daily amount depends on several things, including your weight, how active you are, which diabetes medicines you take, and what your goals are for your blood sugar levels. A registered dietitian or diabetes educator can help you plan how much carbohydrate to include in each meal and snack. A guideline for your daily amount of carbohydrate is:  · 45 to 60 grams at each meal. That's about the same as 3 to 4 carbohydrate servings. · 15 to 20 grams at each snack. That's about the same as 1 carbohydrate serving. The Nutrition Facts label on packaged foods tells you how much carbohydrate is in a serving of the food. First, look at the serving size on the food label. Is that the amount you eat in a serving? All of the nutrition information on a food label is based on that serving size. So if you eat more or less than that, you'll need to adjust the other numbers.  Total carbohydrate is the next thing you need to look for on the label. If you count carbohydrate servings, one serving of carbohydrate is 15 grams. For foods that don't come with labels, such as fresh fruits and vegetables, you'll need a guide that lists carbohydrate in these foods. Ask your doctor, dietitian, or diabetes educator about books or other nutrition guides you can use. If you take insulin, you need to know how many grams of carbohydrate are in a meal. This lets you know how much rapid-acting insulin to take before you eat. If you use an insulin pump, you get a constant rate of insulin during the day. So the pump must be programmed at meals to give you extra insulin to cover the rise in blood sugar after meals. When you know how much carbohydrate you will eat, you can take the right amount of insulin. Or, if you always use the same amount of insulin, you need to make sure that you eat the same amount of carbohydrate at meals. If you need more help to understand carbohydrate counting and food labels, ask your doctor, dietitian, or diabetes educator. How do you get started with meal planning? Here are some tips to get started:  · Plan your meals a week at a time. Don't forget to include snacks too. · Use cookbooks or online recipes to plan several main meals. Plan some quick meals for busy nights. You also can double some recipes that freeze well. Then you can save half for other busy nights when you don't have time to cook. · Make sure you have the ingredients you need for your recipes. If you're running low on basic items, put these items on your shopping list too. · List foods that you use to make breakfasts, lunches, and snacks. List plenty of fruits and vegetables. · Post this list on the refrigerator. Add to it as you think of more things you need. · Take the list to the store to do your weekly shopping. Follow-up care is a key part of your treatment and safety.  Be sure to make and go to all appointments, and call your doctor if you are having problems. It's also a good idea to know your test results and keep a list of the medicines you take. Where can you learn more? Go to http://cori-rojas.info/. Sukumar Knutson in the search box to learn more about \"Learning About Meal Planning for Diabetes. \"  Current as of: March 13, 2017  Content Version: 11.4  © 6921-7689 [employer]. Care instructions adapted under license by [employer] (which disclaims liability or warranty for this information). If you have questions about a medical condition or this instruction, always ask your healthcare professional. [place] [address] any warranty or liability for your use of this information. Insomnia: Care Instructions  Your Care Instructions    Insomnia is the inability to sleep well. It is a common problem for most people at some time. Insomnia may make it hard for you to get to sleep, stay asleep, or sleep as long as you need to. This can make you tired and grouchy during the day. It can also make you forgetful, less effective at work, and unhappy. Insomnia can be caused by conditions such as depression or anxiety. Pain can also affect your ability to sleep. When these problems are solved, the insomnia usually clears up. But sometimes bad sleep habits can cause insomnia. If insomnia is affecting your work or your enjoyment of life, you can take steps to improve your sleep. Follow-up care is a key part of your treatment and safety. Be sure to make and go to all appointments, and call your doctor if you are having problems. It's also a good idea to know your test results and keep a list of the medicines you take. How can you care for yourself at home? What to avoid  · Do not have drinks with caffeine, such as coffee or black tea, for 8 hours before bed. · Do not smoke or use other types of tobacco near bedtime. Nicotine is a stimulant and can keep you awake.   · Avoid drinking alcohol late in the evening, because it can cause you to wake in the middle of the night. · Do not eat a big meal close to bedtime. If you are hungry, eat a light snack. · Do not drink a lot of water close to bedtime, because the need to urinate may wake you up during the night. · Do not read or watch TV in bed. Use the bed only for sleeping and sexual activity. What to try  · Go to bed at the same time every night, and wake up at the same time every morning. Do not take naps during the day. · Keep your bedroom quiet, dark, and cool. · Sleep on a comfortable pillow and mattress. · If watching the clock makes you anxious, turn it facing away from you so you cannot see the time. · If you worry when you lie down, start a worry book. Well before bedtime, write down your worries, and then set the book and your concerns aside. · Try meditation or other relaxation techniques before you go to bed. · If you cannot fall asleep, get up and go to another room until you feel sleepy. Do something relaxing. Repeat your bedtime routine before you go to bed again. · Make your house quiet and calm about an hour before bedtime. Turn down the lights, turn off the TV, log off the computer, and turn down the volume on music. This can help you relax after a busy day. When should you call for help? Watch closely for changes in your health, and be sure to contact your doctor if:  ? · Your efforts to improve your sleep do not work. ? · Your insomnia gets worse. ? · You have been feeling down, depressed, or hopeless or have lost interest in things that you usually enjoy. Where can you learn more? Go to http://cori-rojas.info/. Enter P513 in the search box to learn more about \"Insomnia: Care Instructions. \"  Current as of: July 26, 2016  Content Version: 11.4  © 4757-6599 [employer].  Care instructions adapted under license by [employer] (which disclaims liability or warranty for this information). If you have questions about a medical condition or this instruction, always ask your healthcare professional. [address] any warranty or liability for your use of this information. Learning About Sleeping Well  What does sleeping well mean? Sleeping well means getting enough sleep. How much sleep is enough varies among people. The number of hours you sleep is not as important as how you feel when you wake up. If you do not feel refreshed, you probably need more sleep. Another sign of not getting enough sleep is feeling tired during the day. The average total nightly sleep time is 7½ to 8 hours. Healthy adults may need a little more or a little less than this. Why is getting enough sleep important? Getting enough quality sleep is a basic part of good health. When your sleep suffers, your mood and your thoughts can suffer too. You may find yourself feeling more grumpy or stressed. Not getting enough sleep also can lead to serious problems, including injury, accidents, anxiety, and depression. What might cause poor sleeping? Many things can cause sleep problems, including:  · Stress. Stress can be caused by fear about a single event, such as giving a speech. Or you may have ongoing stress, such as worry about work or school. · Depression, anxiety, and other mental or emotional conditions. · Changes in your sleep habits or surroundings. This includes changes that happen where you sleep, such as noise, light, or sleeping in a different bed. It also includes changes in your sleep pattern, such as having jet lag or working a late shift. · Health problems, such as pain, breathing problems, and restless legs syndrome. · Lack of regular exercise. How can you help yourself? Here are some tips that may help you sleep more soundly and wake up feeling more refreshed. Your sleeping area  · Use your bedroom only for sleeping and sex.  A bit of light reading may help you fall asleep. But if it doesn't, do your reading elsewhere in the house. Don't watch TV in bed. · Be sure your bed is big enough to stretch out comfortably, especially if you have a sleep partner. · Keep your bedroom quiet, dark, and cool. Use curtains, blinds, or a sleep mask to block out light. To block out noise, use earplugs, soothing music, or a \"white noise\" machine. Your evening and bedtime routine  · Create a relaxing bedtime routine. You might want to take a warm shower or bath, listen to soothing music, or drink a cup of noncaffeinated tea. · Go to bed at the same time every night. And get up at the same time every morning, even if you feel tired. What to avoid  · Limit caffeine (coffee, tea, caffeinated sodas) during the day, and don't have any for at least 4 to 6 hours before bedtime. · Don't drink alcohol before bedtime. Alcohol can cause you to wake up more often during the night. · Don't smoke or use tobacco, especially in the evening. Nicotine can keep you awake. · Don't take naps during the day, especially close to bedtime. · Don't lie in bed awake for too long. If you can't fall asleep, or if you wake up in the middle of the night and can't get back to sleep within 15 minutes or so, get out of bed and go to another room until you feel sleepy. · Don't take medicine right before bed that may keep you awake or make you feel hyper or energized. Your doctor can tell you if your medicine may do this and if you can take it earlier in the day. If you can't sleep  · Imagine yourself in a peaceful, pleasant scene. Focus on the details and feelings of being in a place that is relaxing. · Get up and do a quiet or boring activity until you feel sleepy. · Don't drink any liquids after 6 p.m. if you wake up often because you have to go to the bathroom. Where can you learn more? Go to http://kenji.info/.   Enter O410 in the search box to learn more about \"Learning About Sleeping Well. \"  Current as of: May 12, 2017  Content Version: 11.4  © 4010-4128 [employer]. Care instructions adapted under license by [employer] (which disclaims liability or warranty for this information). If you have questions about a medical condition or this instruction, always ask your healthcare professional. [place] [address] any warranty or liability for your use of this information.